# Patient Record
Sex: FEMALE | Race: WHITE | Employment: OTHER | URBAN - METROPOLITAN AREA
[De-identification: names, ages, dates, MRNs, and addresses within clinical notes are randomized per-mention and may not be internally consistent; named-entity substitution may affect disease eponyms.]

---

## 2017-03-09 ENCOUNTER — HOSPITAL ENCOUNTER (OUTPATIENT)
Dept: MAMMOGRAPHY | Age: 72
Discharge: HOME | End: 2017-03-09
Attending: FAMILY MEDICINE
Payer: MEDICARE

## 2017-03-09 DIAGNOSIS — Z78.0 POSTMENOPAUSAL: ICD-10-CM

## 2017-03-09 PROCEDURE — 77080 DXA BONE DENSITY AXIAL: CPT

## 2017-05-08 ENCOUNTER — APPOINTMENT (RX ONLY)
Dept: URBAN - METROPOLITAN AREA CLINIC 23 | Facility: CLINIC | Age: 72
Setting detail: DERMATOLOGY
End: 2017-05-08

## 2017-05-08 DIAGNOSIS — D22 MELANOCYTIC NEVI: ICD-10-CM

## 2017-05-08 DIAGNOSIS — D18.0 HEMANGIOMA: ICD-10-CM

## 2017-05-08 DIAGNOSIS — D485 NEOPLASM OF UNCERTAIN BEHAVIOR OF SKIN: ICD-10-CM

## 2017-05-08 DIAGNOSIS — Z85.828 PERSONAL HISTORY OF OTHER MALIGNANT NEOPLASM OF SKIN: ICD-10-CM

## 2017-05-08 DIAGNOSIS — L50.8 OTHER URTICARIA: ICD-10-CM

## 2017-05-08 PROBLEM — D22.62 MELANOCYTIC NEVI OF LEFT UPPER LIMB, INCLUDING SHOULDER: Status: ACTIVE | Noted: 2017-05-08

## 2017-05-08 PROBLEM — M12.9 ARTHROPATHY, UNSPECIFIED: Status: ACTIVE | Noted: 2017-05-08

## 2017-05-08 PROBLEM — J30.1 ALLERGIC RHINITIS DUE TO POLLEN: Status: ACTIVE | Noted: 2017-05-08

## 2017-05-08 PROBLEM — D18.01 HEMANGIOMA OF SKIN AND SUBCUTANEOUS TISSUE: Status: ACTIVE | Noted: 2017-05-08

## 2017-05-08 PROBLEM — D48.5 NEOPLASM OF UNCERTAIN BEHAVIOR OF SKIN: Status: ACTIVE | Noted: 2017-05-08

## 2017-05-08 PROCEDURE — 11100: CPT

## 2017-05-08 PROCEDURE — A4550 SURGICAL TRAYS: HCPCS

## 2017-05-08 PROCEDURE — 99214 OFFICE O/P EST MOD 30 MIN: CPT | Mod: 25

## 2017-05-08 PROCEDURE — ? BIOPSY BY SHAVE METHOD

## 2017-05-08 PROCEDURE — ? COUNSELING

## 2017-05-08 PROCEDURE — ? TREATMENT REGIMEN

## 2017-05-08 ASSESSMENT — LOCATION SIMPLE DESCRIPTION DERM
LOCATION SIMPLE: LEFT SHOULDER
LOCATION SIMPLE: LEFT TEMPLE
LOCATION SIMPLE: RIGHT PRETIBIAL REGION
LOCATION SIMPLE: ABDOMEN
LOCATION SIMPLE: LEFT PRETIBIAL REGION
LOCATION SIMPLE: LEFT HAND
LOCATION SIMPLE: RIGHT HAND
LOCATION SIMPLE: UPPER BACK

## 2017-05-08 ASSESSMENT — LOCATION ZONE DERM
LOCATION ZONE: LEG
LOCATION ZONE: HAND
LOCATION ZONE: TRUNK
LOCATION ZONE: ARM
LOCATION ZONE: FACE

## 2017-05-08 ASSESSMENT — LOCATION DETAILED DESCRIPTION DERM
LOCATION DETAILED: LEFT LATERAL DISTAL PRETIBIAL REGION
LOCATION DETAILED: LEFT LATERAL TEMPLE
LOCATION DETAILED: RIGHT DISTAL PRETIBIAL REGION
LOCATION DETAILED: SUPERIOR THORACIC SPINE
LOCATION DETAILED: LEFT POSTERIOR SHOULDER
LOCATION DETAILED: RIGHT THENAR EMINENCE
LOCATION DETAILED: LEFT RADIAL PALM
LOCATION DETAILED: EPIGASTRIC SKIN
LOCATION DETAILED: LEFT DISTAL PRETIBIAL REGION

## 2017-05-08 NOTE — PROCEDURE: MIPS QUALITY
Quality 110: Preventive Care And Screening: Influenza Immunization: Influenza Immunization previously received during influenza season
Detail Level: Simple
Quality 111:Pneumonia Vaccination Status For Older Adults: Pneumococcal Vaccination Previously Received
Quality 130: Documentation Of Current Medications In The Medical Record: Current Medications Documented

## 2017-05-08 NOTE — PROCEDURE: BIOPSY BY SHAVE METHOD
Silver Nitrate Text: The wound bed was treated with silver nitrate after the biopsy was performed.
Anesthesia Type: 1% lidocaine with 1:200,000 epinephrine and a 1:10 solution of 8.4% sodium bicarbonate
Detail Level: Detailed
Wound Care: Vaseline
Cryotherapy Text: The wound bed was treated with cryotherapy after the biopsy was performed.
Billing Type: Third-Party Bill
Hemostasis: Aluminum Chloride
Anesthesia Volume In Cc: 0.5
Dressing: pressure dressing with telfa
Biopsy Method: Dermablade
Electrodesiccation Text: The wound bed was treated with electrodesiccation after the biopsy was performed.
Size Of Lesion In Cm: 0
Consent: Written consent was obtained and risks were reviewed including but not limited to scarring, infection, bleeding, scabbing, incomplete removal, nerve damage and allergy to anesthesia.
Render Post-Care Instructions In Note?: no
Type Of Destruction Used: Curettage
Post-Care Instructions: I reviewed with the patient in detail post-care instructions. Patient is to keep the biopsy site dry overnight, and then apply bacitracin twice daily until healed. Patient may apply hydrogen peroxide soaks to remove any crusting.
Biopsy Type: H and E
Accession #: [id number]
Electrodesiccation And Curettage Text: The wound bed was treated with electrodesiccation and curettage after the biopsy was performed.
Notification Instructions: Patient will be notified of biopsy results. However, patient instructed to call the office if not contacted within 2 weeks.
Bill For Surgical Tray: yes

## 2017-05-08 NOTE — PROCEDURE: TREATMENT REGIMEN
Detail Level: Zone
Plan: Take Xyzal every morning and Benadryl at night . Patient given information on Xolair  to consider for treatment .

## 2017-06-13 ENCOUNTER — APPOINTMENT (RX ONLY)
Dept: URBAN - METROPOLITAN AREA CLINIC 24 | Facility: CLINIC | Age: 72
Setting detail: DERMATOLOGY
End: 2017-06-13

## 2017-06-13 PROBLEM — C44.319 BASAL CELL CARCINOMA OF SKIN OF OTHER PARTS OF FACE: Status: ACTIVE | Noted: 2017-06-13

## 2017-06-13 PROCEDURE — 17311 MOHS 1 STAGE H/N/HF/G: CPT

## 2017-06-13 PROCEDURE — 12051 INTMD RPR FACE/MM 2.5 CM/<: CPT

## 2017-06-13 PROCEDURE — ? MOHS SURGERY

## 2017-06-13 PROCEDURE — A4550 SURGICAL TRAYS: HCPCS

## 2017-06-13 NOTE — PROCEDURE: MOHS SURGERY
Consent (Ear)/Introductory Paragraph: The rationale for Mohs was explained to the patient and consent was obtained. The risks, benefits and alternatives to therapy were discussed in detail. Specifically, the risks of ear deformity, infection, scarring, bleeding, prolonged wound healing, incomplete removal, allergy to anesthesia, nerve injury and recurrence were addressed. Prior to the procedure, the treatment site was clearly identified and confirmed by the patient. All components of Universal Protocol/PAUSE Rule completed.
Dermal Autograft Text: The defect edges were debeveled with a #15 scalpel blade.  Given the location of the defect, shape of the defect and the proximity to free margins a dermal autograft was deemed most appropriate.  Using a sterile surgical marker, the primary defect shape was transferred to the donor site. The area thus outlined was incised deep to adipose tissue with a #15 scalpel blade.  The harvested graft was then trimmed of adipose and epidermal tissue until only dermis was left.  The skin graft was then placed in the primary defect and oriented appropriately.
Stage 8: Additional Anesthesia Volume In Cc: 0
Surgical Defect Width In Cm (Optional): -
Postop Diagnosis: same
Quadrant Reporting?: no
Eye Protection Verbiage: Before proceeding with the stage, a plastic scleral shield was inserted. The globe was anesthetized with a few drops of 1% lidocaine with 1:100,000 epinephrine. Then, an appropriate sized scleral shield was chosen and coated with lacrilube ointment. The shield was gently inserted and left in place for the duration of each stage. After the stage was completed, the shield was gently removed.
Complex Repair And Graft Additional Text (Will Appearing After The Standard Complex Repair Text): The complex repair was not sufficient to completely close the primary defect. The remaining additional defect was repaired with the graft mentioned below.
Simple / Intermediate / Complex Repair - Final Wound Length In Cm: 1.5
Alar Island Pedicle Flap Text: The defect edges were debeveled with a #15 scalpel blade.  Given the location of the defect, shape of the defect and the proximity to the alar rim an island pedicle advancement flap was deemed most appropriate.  Using a sterile surgical marker, an appropriate advancement flap was drawn incorporating the defect, outlining the appropriate donor tissue and placing the expected incisions within the nasal ala running parallel to the alar rim. The area thus outlined was incised with a #15 scalpel blade.  The skin margins were undermined minimally to an appropriate distance in all directions around the primary defect and laterally outward around the island pedicle utilizing iris scissors.  There was minimal undermining beneath the pedicle flap.
Graft Donor Site Bandage (Optional-Leave Blank If You Don't Want In Note): Steri-strips and a pressure bandage were applied to the donor site.
Estimated Blood Loss (Cc): minimal
Skin Substitute Text: The defect edges were debeveled with a #15 scalpel blade.  Given the location of the defect, shape of the defect and the proximity to free margins a skin substitute graft was deemed most appropriate.  The graft material was trimmed to fit the size of the defect. The graft was then placed in the primary defect and oriented appropriately.
Biopsy Photograph Reviewed: Yes
Posterior Auricular Interpolation Flap Text: A decision was made to reconstruct the defect utilizing an interpolation axial flap and a staged reconstruction.  A telfa template was made of the defect.  This telfa template was then used to outline the posterior auricular interpolation flap.  The donor area for the pedicle flap was then injected with anesthesia.  The flap was excised through the skin and subcutaneous tissue down to the layer of the underlying musculature.  The pedicle flap was carefully excised within this deep plane to maintain its blood supply.  The edges of the donor site were undermined.   The donor site was closed in a primary fashion.  The pedicle was then rotated into position and sutured.  Once the tube was sutured into place, adequate blood supply was confirmed with blanching and refill.  The pedicle was then wrapped with xeroform gauze and dressed appropriately with a telfa and gauze bandage to ensure continued blood supply and protect the attached pedicle.
Consent 3/Introductory Paragraph: I gave the patient a chance to ask questions they had about the procedure.  Following this I explained the Mohs procedure and consent was obtained. The risks, benefits and alternatives to therapy were discussed in detail. Specifically, the risks of infection, scarring, bleeding, prolonged wound healing, incomplete removal, allergy to anesthesia, nerve injury and recurrence were addressed. Prior to the procedure, the treatment site was clearly identified and confirmed by the patient. All components of Universal Protocol/PAUSE Rule completed.
V-Y Flap Text: The defect edges were debeveled with a #15 scalpel blade.  Given the location of the defect, shape of the defect and the proximity to free margins a V-Y flap was deemed most appropriate.  Using a sterile surgical marker, an appropriate advancement flap was drawn incorporating the defect and placing the expected incisions within the relaxed skin tension lines where possible.    The area thus outlined was incised deep to adipose tissue with a #15 scalpel blade.  The skin margins were undermined to an appropriate distance in all directions utilizing iris scissors.
Consent Type: Consent 1 (Standard)
Complex Repair And Flap Additional Text (Will Appearing After The Standard Complex Repair Text): The complex repair was not sufficient to completely close the primary defect. The remaining additional defect was repaired with the flap mentioned below.
Area M Indication Text: Tumors in this location are included in Area M (cheek, forehead, scalp, neck, jawline and pretibial skin).  Mohs surgery is indicated for tumors 1 cm or larger in these anatomic locations.
Ear Wedge Repair Text: A wedge excision was completed by carrying down an excision through the full thickness of the ear and cartilage with an inward facing Burow's triangle. The wound was then closed in a layered fashion.
Graft Donor Site Dermal Sutures (Optional): 5-0 PDS
Additional Epidermal Closure (Optional): vertical mattress
Dorsal Nasal Flap Text: The defect edges were debeveled with a #15 scalpel blade.  Given the location of the defect and the proximity to free margins a dorsal nasal flap was deemed most appropriate.  Using a sterile surgical marker, an appropriate dorsal nasal flap was drawn around the defect.    The area thus outlined was incised deep to adipose tissue with a #15 scalpel blade.  The skin margins were undermined to an appropriate distance in all directions utilizing iris scissors.
Initial Size Of Lesion: 0.4
Mohs Method Verbiage: An incision at a 45 degree angle following the standard Mohs approach was done and the specimen was harvested as a microscopic controlled layer.
Consent (Marginal Mandibular)/Introductory Paragraph: The rationale for Mohs was explained to the patient and consent was obtained. The risks, benefits and alternatives to therapy were discussed in detail. Specifically, the risks of damage to the marginal mandibular branch of the facial nerve, infection, scarring, bleeding, prolonged wound healing, incomplete removal, allergy to anesthesia, and recurrence were addressed. Prior to the procedure, the treatment site was clearly identified and confirmed by the patient. All components of Universal Protocol/PAUSE Rule completed.
Lazy S Complex Repair Preamble Text (Leave Blank If You Do Not Want): Extensive wide undermining was performed.
Crescentic Intermediate Repair Preamble Text (Leave Blank If You Do Not Want): Undermining was performed with blunt dissection.
No Residual Tumor Seen Histology Text: There were no malignant cells seen in the sections examined.
Anesthesia Volume In Cc: 3
Bi-Rhombic Flap Text: The defect edges were debeveled with a #15 scalpel blade.  Given the location of the defect and the proximity to free margins a bi-rhombic flap was deemed most appropriate.  Using a sterile surgical marker, an appropriate rhombic flap was drawn incorporating the defect. The area thus outlined was incised deep to adipose tissue with a #15 scalpel blade.  The skin margins were undermined to an appropriate distance in all directions utilizing iris scissors.
Purse String (Intermediate) Text: Given the location of the defect and the characteristics of the surrounding skin a pursestring intermediate closure was deemed most appropriate.  Undermining was performed circumfirentially around the surgical defect.  A purstring suture was then placed and tightened.
Consent (Spinal Accessory)/Introductory Paragraph: The rationale for Mohs was explained to the patient and consent was obtained. The risks, benefits and alternatives to therapy were discussed in detail. Specifically, the risks of damage to the spinal accessory nerve, infection, scarring, bleeding, prolonged wound healing, incomplete removal, allergy to anesthesia, and recurrence were addressed. Prior to the procedure, the treatment site was clearly identified and confirmed by the patient. All components of Universal Protocol/PAUSE Rule completed.
Closure 4 Information: This tab is for additional flaps and grafts above and beyond our usual structured repairs.  Please note if you enter information here it will not currently bill and you will need to add the billing information manually.
Stage 2: Additional Anesthesia Type: 1% lidocaine with 1:100,000 epinephrine
Bcc Histology Text: There were numerous aggregates of basaloid cells.
Burow's Advancement Flap Text: The defect edges were debeveled with a #15 scalpel blade.  Given the location of the defect and the proximity to free margins a Burow's advancement flap was deemed most appropriate.  Using a sterile surgical marker, the appropriate advancement flap was drawn incorporating the defect and placing the expected incisions within the relaxed skin tension lines where possible.    The area thus outlined was incised deep to adipose tissue with a #15 scalpel blade.  The skin margins were undermined to an appropriate distance in all directions utilizing iris scissors.
Unna Boot Text: An Unna boot was placed to help immobilize the limb and facilitate more rapid healing.
No Repair - Repaired With Adjacent Surgical Defect Text (Leave Blank If You Do Not Want): After obtaining clear surgical margins the defect was repaired concurrently with another surgical defect which was in close approximation.
Dressing: dry sterile dressing
Cartilage Graft Text: The defect edges were debeveled with a #15 scalpel blade.  Given the location of the defect, shape of the defect, the fact the defect involved a full thickness cartilage defect a cartilage graft was deemed most appropriate.  An appropriate donor site was identified, cleansed, and anesthetized. The cartilage graft was then harvested and transferred to the recipient site, oriented appropriately and then sutured into place.  The secondary defect was then repaired using a primary closure.
O-T Advancement Flap Text: The defect edges were debeveled with a #15 scalpel blade.  Given the location of the defect, shape of the defect and the proximity to free margins an O-T advancement flap was deemed most appropriate.  Using a sterile surgical marker, an appropriate advancement flap was drawn incorporating the defect and placing the expected incisions within the relaxed skin tension lines where possible.    The area thus outlined was incised deep to adipose tissue with a #15 scalpel blade.  The skin margins were undermined to an appropriate distance in all directions utilizing iris scissors.
Trilobed Flap Text: The defect edges were debeveled with a #15 scalpel blade.  Given the location of the defect and the proximity to free margins a trilobed flap was deemed most appropriate.  Using a sterile surgical marker, an appropriate trilobed flap drawn around the defect.    The area thus outlined was incised deep to adipose tissue with a #15 scalpel blade.  The skin margins were undermined to an appropriate distance in all directions utilizing iris scissors.
Stage 13: Additional Anesthesia Type: 1% lidocaine with epinephrine
Island Pedicle Flap With Canthal Suspension Text: The defect edges were debeveled with a #15 scalpel blade.  Given the location of the defect, shape of the defect and the proximity to free margins an island pedicle advancement flap was deemed most appropriate.  Using a sterile surgical marker, an appropriate advancement flap was drawn incorporating the defect, outlining the appropriate donor tissue and placing the expected incisions within the relaxed skin tension lines where possible. The area thus outlined was incised deep to adipose tissue with a #15 scalpel blade.  The skin margins were undermined to an appropriate distance in all directions around the primary defect and laterally outward around the island pedicle utilizing iris scissors.  There was minimal undermining beneath the pedicle flap. A suspension suture was placed in the canthal tendon to prevent tension and prevent ectropion.
Rotation Flap Text: The defect edges were debeveled with a #15 scalpel blade.  Given the location of the defect, shape of the defect and the proximity to free margins a rotation flap was deemed most appropriate.  Using a sterile surgical marker, an appropriate rotation flap was drawn incorporating the defect and placing the expected incisions within the relaxed skin tension lines where possible.    The area thus outlined was incised deep to adipose tissue with a #15 scalpel blade.  The skin margins were undermined to an appropriate distance in all directions utilizing iris scissors.
Mohs Histo Method Verbiage: The section(s) were then chromacoded and processed in the Mohs lab using the Mohs protocol and submitted for frozen section.
Double Island Pedicle Flap Text: The defect edges were debeveled with a #15 scalpel blade.  Given the location of the defect, shape of the defect and the proximity to free margins a double island pedicle advancement flap was deemed most appropriate.  Using a sterile surgical marker, an appropriate advancement flap was drawn incorporating the defect, outlining the appropriate donor tissue and placing the expected incisions within the relaxed skin tension lines where possible.    The area thus outlined was incised deep to adipose tissue with a #15 scalpel blade.  The skin margins were undermined to an appropriate distance in all directions around the primary defect and laterally outward around the island pedicle utilizing iris scissors.  There was minimal undermining beneath the pedicle flap.
Anesthesia Type: 1% lidocaine without epinephrine and a 1:10 solution of 8.4% sodium bicarbonate
Alternatives Discussed Intro (Do Not Add Period): I discussed alternative treatments to Mohs surgery and specifically discussed the risks and benefits of
Split-Thickness Skin Graft Text: The defect edges were debeveled with a #15 scalpel blade.  Given the location of the defect, shape of the defect and the proximity to free margins a split thickness skin graft was deemed most appropriate.  Using a sterile surgical marker, the primary defect shape was transferred to the donor site. The split thickness graft was then harvested.  The skin graft was then placed in the primary defect and oriented appropriately.
Melolabial Interpolation Flap Text: A decision was made to reconstruct the defect utilizing an interpolation axial flap and a staged reconstruction.  A telfa template was made of the defect.  This telfa template was then used to outline the melolabial interpolation flap.  The donor area for the pedicle flap was then injected with anesthesia.  The flap was excised through the skin and subcutaneous tissue down to the layer of the underlying musculature.  The pedicle flap was carefully excised within this deep plane to maintain its blood supply.  The edges of the donor site were undermined.   The donor site was closed in a primary fashion.  The pedicle was then rotated into position and sutured.  Once the tube was sutured into place, adequate blood supply was confirmed with blanching and refill.  The pedicle was then wrapped with xeroform gauze and dressed appropriately with a telfa and gauze bandage to ensure continued blood supply and protect the attached pedicle.
Referred To Mid-Level For Closure Text (Leave Blank If You Do Not Want): After obtaining clear surgical margins the patient was sent to a mid-level provider for surgical repair.  The patient understands they will receive post-surgical care and follow-up from the mid-level provider.
Anesthesia Volume In Cc: 2.5
Anesthesia Type: 1% lidocaine without epinephrine
Xenograft Text: The defect edges were debeveled with a #15 scalpel blade.  Given the location of the defect, shape of the defect and the proximity to free margins a xenograft was deemed most appropriate.  The graft was then trimmed to fit the size of the defect.  The graft was then placed in the primary defect and oriented appropriately.
Detail Level: Detailed
Number Of Stages: 1
Graft Donor Site Epidermal Sutures (Optional): 6-0 Prolene
Advancement-Rotation Flap Text: The defect edges were debeveled with a #15 scalpel blade.  Given the location of the defect, shape of the defect and the proximity to free margins an advancement-rotation flap was deemed most appropriate.  Using a sterile surgical marker, an appropriate flap was drawn incorporating the defect and placing the expected incisions within the relaxed skin tension lines where possible. The area thus outlined was incised deep to adipose tissue with a #15 scalpel blade.  The skin margins were undermined to an appropriate distance in all directions utilizing iris scissors.
Bcc Infiltrative Histology Text: There were numerous aggregates of basaloid cells demonstrating an infiltrative pattern.
Rhombic Flap Text: The defect edges were debeveled with a #15 scalpel blade.  Given the location of the defect and the proximity to free margins a rhombic flap was deemed most appropriate.  Using a sterile surgical marker, an appropriate rhombic flap was drawn incorporating the defect.    The area thus outlined was incised deep to adipose tissue with a #15 scalpel blade.  The skin margins were undermined to an appropriate distance in all directions utilizing iris scissors.
Area H Indication Text: Tumors in this location are included in Area H (eyelids, eyebrows, nose, lips, chin, ear, pre-auricular, post-auricular, temple, genitalia, hands, feet, ankles and areola).  Tissue conservation is critical in these anatomic locations.
Advancement Flap (Double) Text: The defect edges were debeveled with a #15 scalpel blade.  Given the location of the defect and the proximity to free margins a double advancement flap was deemed most appropriate.  Using a sterile surgical marker, the appropriate advancement flaps were drawn incorporating the defect and placing the expected incisions within the relaxed skin tension lines where possible.    The area thus outlined was incised deep to adipose tissue with a #15 scalpel blade.  The skin margins were undermined to an appropriate distance in all directions utilizing iris scissors.
Consent (Lip)/Introductory Paragraph: The rationale for Mohs was explained to the patient and consent was obtained. The risks, benefits and alternatives to therapy were discussed in detail. Specifically, the risks of lip deformity, changes in the oral aperture, infection, scarring, bleeding, prolonged wound healing, incomplete removal, allergy to anesthesia, nerve injury and recurrence were addressed. Prior to the procedure, the treatment site was clearly identified and confirmed by the patient. All components of Universal Protocol/PAUSE Rule completed.
Island Pedicle Flap-Requiring Vessel Identification Text: The defect edges were debeveled with a #15 scalpel blade.  Given the location of the defect, shape of the defect and the proximity to free margins an island pedicle advancement flap was deemed most appropriate.  Using a sterile surgical marker, an appropriate advancement flap was drawn, based on the axial vessel mentioned above, incorporating the defect, outlining the appropriate donor tissue and placing the expected incisions within the relaxed skin tension lines where possible.    The area thus outlined was incised deep to adipose tissue with a #15 scalpel blade.  The skin margins were undermined to an appropriate distance in all directions around the primary defect and laterally outward around the island pedicle utilizing iris scissors.  There was minimal undermining beneath the pedicle flap.
O-T Plasty Text: The defect edges were debeveled with a #15 scalpel blade.  Given the location of the defect, shape of the defect and the proximity to free margins an O-T plasty was deemed most appropriate.  Using a sterile surgical marker, an appropriate O-T plasty was drawn incorporating the defect and placing the expected incisions within the relaxed skin tension lines where possible.    The area thus outlined was incised deep to adipose tissue with a #15 scalpel blade.  The skin margins were undermined to an appropriate distance in all directions utilizing iris scissors.
Partial Purse String (Intermediate) Text: Given the location of the defect and the characteristics of the surrounding skin an intermediate purse string closure was deemed most appropriate.  Undermining was performed circumfirentially around the surgical defect.  A purse string suture was then placed and tightened. Wound tension only allowed a partial closure of the circular defect.
Consent 2/Introductory Paragraph: Mohs surgery was explained to the patient and consent was obtained. The risks, benefits and alternatives to therapy were discussed in detail. Specifically, the risks of infection, scarring, bleeding, prolonged wound healing, incomplete removal, allergy to anesthesia, nerve injury and recurrence were addressed. Prior to the procedure, the treatment site was clearly identified and confirmed by the patient. All components of Universal Protocol/PAUSE Rule completed.
Tissue Cultured Epidermal Autograft Text: The defect edges were debeveled with a #15 scalpel blade.  Given the location of the defect, shape of the defect and the proximity to free margins a tissue cultured epidermal autograft was deemed most appropriate.  The graft was then trimmed to fit the size of the defect.  The graft was then placed in the primary defect and oriented appropriately.
Tumor Debulked?: curette
Localized Dermabrasion With Wire Brush Text: The patient was draped in routine manner.  Localized dermabrasion using 3 x 17 mm wire brush was performed in routine manner to papillary dermis. This spot dermabrasion is being performed to complete skin cancer reconstruction. It also will eliminate the other sun damaged precancerous cells that are known to be part of the regional effect of a lifetime's worth of sun exposure. This localized dermabrasion is therapeutic and should not be considered cosmetic in any regard.
Wound Care: Bacitracin
Secondary Intention Text (Leave Blank If You Do Not Want): The defect will heal with secondary intention.
H Plasty Text: Given the location of the defect, shape of the defect and the proximity to free margins a H-plasty was deemed most appropriate for repair.  Using a sterile surgical marker, the appropriate advancement arms of the H-plasty were drawn incorporating the defect and placing the expected incisions within the relaxed skin tension lines where possible. The area thus outlined was incised deep to adipose tissue with a #15 scalpel blade. The skin margins were undermined to an appropriate distance in all directions utilizing iris scissors.  The opposing advancement arms were then advanced into place in opposite direction and anchored with interrupted buried subcutaneous sutures.
A-T Advancement Flap Text: The defect edges were debeveled with a #15 scalpel blade.  Given the location of the defect, shape of the defect and the proximity to free margins an A-T advancement flap was deemed most appropriate.  Using a sterile surgical marker, an appropriate advancement flap was drawn incorporating the defect and placing the expected incisions within the relaxed skin tension lines where possible.    The area thus outlined was incised deep to adipose tissue with a #15 scalpel blade.  The skin margins were undermined to an appropriate distance in all directions utilizing iris scissors.
Repair Type: Intermediate Layered Repair
Mauc Instructions: By selecting yes to the question below the MAUC number will be added into the note.  This will be calculated automatically based on the diagnosis chosen, the size entered, the body zone selected (H,M,L) and the specific indications you chose. You will also have the option to override the Mohs AUC if you disagree with the automatically calculated number and this option is found in the Case Summary tab.
Location Indication Override (Is Already Calculated Based On Selected Body Location): Area H
Post-Care Instructions: I reviewed with the patient in detail post-care instructions. Patient is not to engage in any heavy lifting, exercise, or swimming for the next 14 days. Should the patient develop any fevers, chills, bleeding, severe pain patient will contact the office immediately..\\n.
Transposition Flap Text: The defect edges were debeveled with a #15 scalpel blade.  Given the location of the defect and the proximity to free margins a transposition flap was deemed most appropriate.  Using a sterile surgical marker, an appropriate transposition flap was drawn incorporating the defect.    The area thus outlined was incised deep to adipose tissue with a #15 scalpel blade.  The skin margins were undermined to an appropriate distance in all directions utilizing iris scissors.
Area L Indication Text: Tumors in this location are included in Area L (trunk and extremities).  Mohs surgery is indicated for larger tumors, 2 cm or larger, in these anatomic locations.
Epidermal Autograft Text: The defect edges were debeveled with a #15 scalpel blade.  Given the location of the defect, shape of the defect and the proximity to free margins an epidermal autograft was deemed most appropriate.  Using a sterile surgical marker, the primary defect shape was transferred to the donor site. The epidermal graft was then harvested.  The skin graft was then placed in the primary defect and oriented appropriately.
Consent (Near Eyelid Margin)/Introductory Paragraph: The rationale for Mohs was explained to the patient and consent was obtained. The risks, benefits and alternatives to therapy were discussed in detail. Specifically, the risks of ectropion or eyelid deformity, infection, scarring, bleeding, prolonged wound healing, incomplete removal, allergy to anesthesia, nerve injury and recurrence were addressed. Prior to the procedure, the treatment site was clearly identified and confirmed by the patient. All components of Universal Protocol/PAUSE Rule completed.
Full Thickness Lip Wedge Repair (Flap) Text: Given the location of the defect and the proximity to free margins a full thickness wedge repair was deemed most appropriate.  Using a sterile surgical marker, the appropriate repair was drawn incorporating the defect and placing the expected incisions perpendicular to the vermillion border.  The vermillion border was also meticulously outlined to ensure appropriate reapproximation during the repair.  The area thus outlined was incised through and through with a #15 scalpel blade.  The muscularis and dermis were reaproximated with deep sutures following hemostasis. Care was taken to realign the vermillion border before proceeding with the superficial closure.  Once the vermillion was realigned the superfical and mucosal closure was finished.
Crescentic Advancement Flap Text: The defect edges were debeveled with a #15 scalpel blade.  Given the location of the defect and the proximity to free margins a crescentic advancement flap was deemed most appropriate.  Using a sterile surgical marker, the appropriate advancement flap was drawn incorporating the defect and placing the expected incisions within the relaxed skin tension lines where possible.    The area thus outlined was incised deep to adipose tissue with a #15 scalpel blade.  The skin margins were undermined to an appropriate distance in all directions utilizing iris scissors.
Paramedian Forehead Flap Text: A decision was made to reconstruct the defect utilizing an interpolation axial flap and a staged reconstruction.  A telfa template was made of the defect.  This telfa template was then used to outline the paramedian forehead pedicle flap.  The donor area for the pedicle flap was then injected with anesthesia.  The flap was excised through the skin and subcutaneous tissue down to the layer of the underlying musculature.  The pedicle flap was carefully excised within this deep plane to maintain its blood supply.  The edges of the donor site were undermined.   The donor site was closed in a primary fashion.  The pedicle was then rotated into position and sutured.  Once the tube was sutured into place, adequate blood supply was confirmed with blanching and refill.  The pedicle was then wrapped with xeroform gauze and dressed appropriately with a telfa and gauze bandage to ensure continued blood supply and protect the attached pedicle.
Cheek-To-Nose Interpolation Flap Text: A decision was made to reconstruct the defect utilizing an interpolation axial flap and a staged reconstruction.  A telfa template was made of the defect.  This telfa template was then used to outline the Cheek-To-Nose Interpolation flap.  The donor area for the pedicle flap was then injected with anesthesia.  The flap was excised through the skin and subcutaneous tissue down to the layer of the underlying musculature.  The interpolation flap was carefully excised within this deep plane to maintain its blood supply.  The edges of the donor site were undermined.   The donor site was closed in a primary fashion.  The pedicle was then rotated into position and sutured.  Once the tube was sutured into place, adequate blood supply was confirmed with blanching and refill.  The pedicle was then wrapped with xeroform gauze and dressed appropriately with a telfa and gauze bandage to ensure continued blood supply and protect the attached pedicle.
Bilobed Transposition Flap Text: The defect edges were debeveled with a #15 scalpel blade.  Given the location of the defect and the proximity to free margins a bilobed transposition flap was deemed most appropriate.  Using a sterile surgical marker, an appropriate bilobe flap drawn around the defect.    The area thus outlined was incised deep to adipose tissue with a #15 scalpel blade.  The skin margins were undermined to an appropriate distance in all directions utilizing iris scissors.
Partial Purse String (Simple) Text: Given the location of the defect and the characteristics of the surrounding skin a simple purse string closure was deemed most appropriate.  Undermining was performed circumfirentially around the surgical defect.  A purse string suture was then placed and tightened. Wound tension only allowed a partial closure of the circular defect.
Mastoid Interpolation Flap Text: A decision was made to reconstruct the defect utilizing an interpolation axial flap and a staged reconstruction.  A telfa template was made of the defect.  This telfa template was then used to outline the mastoid interpolation flap.  The donor area for the pedicle flap was then injected with anesthesia.  The flap was excised through the skin and subcutaneous tissue down to the layer of the underlying musculature.  The pedicle flap was carefully excised within this deep plane to maintain its blood supply.  The edges of the donor site were undermined.   The donor site was closed in a primary fashion.  The pedicle was then rotated into position and sutured.  Once the tube was sutured into place, adequate blood supply was confirmed with blanching and refill.  The pedicle was then wrapped with xeroform gauze and dressed appropriately with a telfa and gauze bandage to ensure continued blood supply and protect the attached pedicle.
Hatchet Flap Text: The defect edges were debeveled with a #15 scalpel blade.  Given the location of the defect, shape of the defect and the proximity to free margins a hatchet flap was deemed most appropriate.  Using a sterile surgical marker, an appropriate hatchet flap was drawn incorporating the defect and placing the expected incisions within the relaxed skin tension lines where possible.    The area thus outlined was incised deep to adipose tissue with a #15 scalpel blade.  The skin margins were undermined to an appropriate distance in all directions utilizing iris scissors.
Cheiloplasty (Complex) Text: A decision was made to reconstruct the defect with a  cheiloplasty.  The defect was undermined extensively.  Additional obicularis oris muscle was excised with a 15 blade scalpel.  The defect was converted into a full thickness wedge to facilite a better cosmetic result.  Small vessels were then tied off with 5-0 monocyrl. The obicularis oris, superficial fascia, adipose and dermis were then reapproximated.  After the deeper layers were approximated the epidermis was reapproximated with particular care given to realign the vermillion border.
Deep Sutures: 5-0 Polysorb
Consent 1/Introductory Paragraph: The rationale for Mohs was explained to the patient and consent was obtained. The risks, benefits and alternatives to therapy were discussed in detail. Specifically, the risks of infection, scarring, bleeding, prolonged wound healing, incomplete removal, allergy to anesthesia, nerve injury and recurrence were addressed. Prior to the procedure, the treatment site was clearly identified and confirmed by the patient. All components of Universal Protocol/PAUSE Rule completed.
Advancement Flap (Single) Text: The defect edges were debeveled with a #15 scalpel blade.  Given the location of the defect and the proximity to free margins a single advancement flap was deemed most appropriate.  Using a sterile surgical marker, an appropriate advancement flap was drawn incorporating the defect and placing the expected incisions within the relaxed skin tension lines where possible.    The area thus outlined was incised deep to adipose tissue with a #15 scalpel blade.  The skin margins were undermined to an appropriate distance in all directions utilizing iris scissors.
Melolabial Transposition Flap Text: The defect edges were debeveled with a #15 scalpel blade.  Given the location of the defect and the proximity to free margins a melolabial flap was deemed most appropriate.  Using a sterile surgical marker, an appropriate melolabial transposition flap was drawn incorporating the defect.    The area thus outlined was incised deep to adipose tissue with a #15 scalpel blade.  The skin margins were undermined to an appropriate distance in all directions utilizing iris scissors.
Manual Repair Warning Statement: We plan on removing the manually selected variable below in favor of our much easier automatic structured text blocks found in the previous tab. We decided to do this to help make the flow better and give you the full power of structured data. Manual selection is never going to be ideal in our platform and I would encourage you to avoid using manual selection from this point on, especially since I will be sunsetting this feature. It is important that you do one of two things with the customized text below. First, you can save all of the text in a word file so you can have it for future reference. Second, transfer the text to the appropriate area in the Library tab. Lastly, if there is a flap or graft type which we do not have you need to let us know right away so I can add it in before the variable is hidden. No need to panic, we plan to give you roughly 6 months to make the change.
W Plasty Text: The lesion was extirpated to the level of the fat with a #15 scalpel blade.  Given the location of the defect, shape of the defect and the proximity to free margins a W-plasty was deemed most appropriate for repair.  Using a sterile surgical marker, the appropriate transposition arms of the W-plasty were drawn incorporating the defect and placing the expected incisions within the relaxed skin tension lines where possible.    The area thus outlined was incised deep to adipose tissue with a #15 scalpel blade.  The skin margins were undermined to an appropriate distance in all directions utilizing iris scissors.  The opposing transposition arms were then transposed into place in opposite direction and anchored with interrupted buried subcutaneous sutures.
Same Histology In Subsequent Stages Text: The pattern and morphology of the tumor is as described in the first stage.
Cheiloplasty (Less Than 50%) Text: A decision was made to reconstruct the defect with a  cheiloplasty.  The defect was undermined extensively.  Additional obicularis oris muscle was excised with a 15 blade scalpel.  The defect was converted into a full thickness wedge, of less than 50% of the vertical height of the lip, to facilite a better cosmetic result.  Small vessels were then tied off with 5-0 monocyrl. The obicularis oris, superficial fascia, adipose and dermis were then reapproximated.  After the deeper layers were approximated the epidermis was reapproximated with particular care given to realign the vermillion border.
Medical Necessity Statement: Based on my medical judgement, Mohs surgery is the most appropriate treatment for this cancer compared to other treatments.
O-Z Plasty Text: The defect edges were debeveled with a #15 scalpel blade.  Given the location of the defect, shape of the defect and the proximity to free margins an O-Z plasty (double transposition flap) was deemed most appropriate.  Using a sterile surgical marker, the appropriate transposition flaps were drawn incorporating the defect and placing the expected incisions within the relaxed skin tension lines where possible.    The area thus outlined was incised deep to adipose tissue with a #15 scalpel blade.  The skin margins were undermined to an appropriate distance in all directions utilizing iris scissors.  Hemostasis was achieved with electrocautery.  The flaps were then transposed into place, one clockwise and the other counterclockwise, and anchored with interrupted buried subcutaneous sutures.
Ftsg Text: The defect edges were debeveled with a #15 scalpel blade.  Given the location of the defect, shape of the defect and the proximity to free margins a full thickness skin graft was deemed most appropriate.  Using a sterile surgical marker, the primary defect shape was transferred to the donor site. The area thus outlined was incised deep to adipose tissue with a #15 scalpel blade.  The harvested graft was then trimmed of adipose tissue until only dermis and epidermis was left.  The skin margins of the secondary defect were undermined to an appropriate distance in all directions utilizing iris scissors.  The secondary defect was closed with interrupted buried subcutaneous sutures.  The skin edges were then re-apposed with running  sutures.  The skin graft was then placed in the primary defect and oriented appropriately.
Referred To Plastics For Closure Text (Leave Blank If You Do Not Want): After obtaining clear surgical margins the patient was sent to plastics for surgical repair.  The patient understands they will receive post-surgical care and follow-up from the referring physician's office.
Referred To Otolaryngology For Closure Text (Leave Blank If You Do Not Want): After obtaining clear surgical margins the patient was sent to otolaryngology for surgical repair.  The patient understands they will receive post-surgical care and follow-up from the referring physician's office.
Consent (Nose)/Introductory Paragraph: The rationale for Mohs was explained to the patient and consent was obtained. The risks, benefits and alternatives to therapy were discussed in detail. Specifically, the risks of nasal deformity, changes in the flow of air through the nose, infection, scarring, bleeding, prolonged wound healing, incomplete removal, allergy to anesthesia, nerve injury and recurrence were addressed. Prior to the procedure, the treatment site was clearly identified and confirmed by the patient. All components of Universal Protocol/PAUSE Rule completed.
Mucosal Advancement Flap Text: Given the location of the defect, shape of the defect and the proximity to free margins a mucosal advancement flap was deemed most appropriate. Incisions were made with a 15 blade scalpel in the appropriate fashion along the cutaneous vermillion border and the mucosal lip. The remaining actinically damaged mucosal tissue was excised.  The mucosal advancement flap was then elevated to the gingival sulcus with care taken to preserve the neurovascular structures and advanced into the primary defect. Care was taken to ensure that precise realignment of the vermillion border was achieved.
Referred To Oculoplastics For Closure Text (Leave Blank If You Do Not Want): After obtaining clear surgical margins the patient was sent to oculoplastics for surgical repair.  The patient understands they will receive post-surgical care and follow-up from the referring physician's office.
Island Pedicle Flap Text: The defect edges were debeveled with a #15 scalpel blade.  Given the location of the defect, shape of the defect and the proximity to free margins an island pedicle advancement flap was deemed most appropriate.  Using a sterile surgical marker, an appropriate advancement flap was drawn incorporating the defect, outlining the appropriate donor tissue and placing the expected incisions within the relaxed skin tension lines where possible.    The area thus outlined was incised deep to adipose tissue with a #15 scalpel blade.  The skin margins were undermined to an appropriate distance in all directions around the primary defect and laterally outward around the island pedicle utilizing iris scissors.  There was minimal undermining beneath the pedicle flap.
Tarsorrhaphy Text: A tarsorrhaphy was performed using Frost sutures.
Consent (Temporal Branch)/Introductory Paragraph: The rationale for Mohs was explained to the patient and consent was obtained. The risks, benefits and alternatives to therapy were discussed in detail. Specifically, the risks of damage to the temporal branch of the facial nerve, infection, scarring, bleeding, prolonged wound healing, incomplete removal, allergy to anesthesia, and recurrence were addressed. Prior to the procedure, the treatment site was clearly identified and confirmed by the patient. All components of Universal Protocol/PAUSE Rule completed.
Star Wedge Flap Text: The defect edges were debeveled with a #15 scalpel blade.  Given the location of the defect, shape of the defect and the proximity to free margins a star wedge flap was deemed most appropriate.  Using a sterile surgical marker, an appropriate rotation flap was drawn incorporating the defect and placing the expected incisions within the relaxed skin tension lines where possible. The area thus outlined was incised deep to adipose tissue with a #15 scalpel blade.  The skin margins were undermined to an appropriate distance in all directions utilizing iris scissors.
Muscle Hinge Flap Text: The defect edges were debeveled with a #15 scalpel blade.  Given the size, depth and location of the defect and the proximity to free margins a muscle hinge flap was deemed most appropriate.  Using a sterile surgical marker, an appropriate hinge flap was drawn incorporating the defect. The area thus outlined was incised with a #15 scalpel blade.  The skin margins were undermined to an appropriate distance in all directions utilizing iris scissors.
Repair Performed By Another Provider Text (Leave Blank If You Do Not Want): After obtaining clear surgical margins the defect was repaired by another provider.
Repair Anesthesia Method: local infiltration
Consent (Scalp)/Introductory Paragraph: The rationale for Mohs was explained to the patient and consent was obtained. The risks, benefits and alternatives to therapy were discussed in detail. Specifically, the risks of changes in hair growth pattern secondary to repair, infection, scarring, bleeding, prolonged wound healing, incomplete removal, allergy to anesthesia, nerve injury and recurrence were addressed. Prior to the procedure, the treatment site was clearly identified and confirmed by the patient. All components of Universal Protocol/PAUSE Rule completed.
Hemostasis: Electrocautery
Surgeon: George brown
Mohs Case Number: [id number]
Purse String (Simple) Text: Given the location of the defect and the characteristics of the surrounding skin a pursestring closure was deemed most appropriate.  Undermining was performed circumfirentially around the surgical defect.  A purstring suture was then placed and tightened.
Helical Rim Advancement Flap Text: The defect edges were debeveled with a #15 blade scalpel.  Given the location of the defect and the proximity to free margins (helical rim) a double helical rim advancement flap was deemed most appropriate.  Using a sterile surgical marker, the appropriate advancement flaps were drawn incorporating the defect and placing the expected incisions between the helical rim and antihelix where possible.  The area thus outlined was incised through and through with a #15 scalpel blade.  With a skin hook and iris scissors, the flaps were gently and sharply undermined and freed up.
Composite Graft Text: The defect edges were debeveled with a #15 scalpel blade.  Given the location of the defect, shape of the defect, the proximity to free margins and the fact the defect was full thickness a composite graft was deemed most appropriate.  The defect was outline and then transferred to the donor site.  A full thickness graft was then excised from the donor site. The graft was then placed in the primary defect, oriented appropriately and then sutured into place.  The secondary defect was then repaired using a primary closure.
Modified Advancement Flap Text: The defect edges were debeveled with a #15 scalpel blade.  Given the location of the defect, shape of the defect and the proximity to free margins a modified advancement flap was deemed most appropriate.  Using a sterile surgical marker, an appropriate advancement flap was drawn incorporating the defect and placing the expected incisions within the relaxed skin tension lines where possible.    The area thus outlined was incised deep to adipose tissue with a #15 scalpel blade.  The skin margins were undermined to an appropriate distance in all directions utilizing iris scissors.
Inflammation Suggestive Of Cancer Camouflage Histology Text: There was a dense lymphocytic infiltrate which prevented adequate histologic evaluation of adjacent structures.
Z Plasty Text: The lesion was extirpated to the level of the fat with a #15 scalpel blade.  Given the location of the defect, shape of the defect and the proximity to free margins a Z-plasty was deemed most appropriate for repair.  Using a sterile surgical marker, the appropriate transposition arms of the Z-plasty were drawn incorporating the defect and placing the expected incisions within the relaxed skin tension lines where possible.    The area thus outlined was incised deep to adipose tissue with a #15 scalpel blade.  The skin margins were undermined to an appropriate distance in all directions utilizing iris scissors.  The opposing transposition arms were then transposed into place in opposite direction and anchored with interrupted buried subcutaneous sutures.
Closure 2 Information: This tab is for additional flaps and grafts, including complex repair and grafts and complex repair and flaps. You can also specify a different location for the additional defect, if the location is the same you do not need to select a new one. We will insert the automated text for the repair you select below just as we do for solitary flaps and grafts. Please note that at this time if you select a location with a different insurance zone you will need to override the ICD10 and CPT if appropriate.
Ear Star Wedge Flap Text: The defect edges were debeveled with a #15 blade scalpel.  Given the location of the defect and the proximity to free margins (helical rim) an ear star wedge flap was deemed most appropriate.  Using a sterile surgical marker, the appropriate flap was drawn incorporating the defect and placing the expected incisions between the helical rim and antihelix where possible.  The area thus outlined was incised through and through with a #15 scalpel blade.
Bilobed Flap Text: The defect edges were debeveled with a #15 scalpel blade.  Given the location of the defect and the proximity to free margins a bilobe flap was deemed most appropriate.  Using a sterile surgical marker, an appropriate bilobe flap drawn around the defect.    The area thus outlined was incised deep to adipose tissue with a #15 scalpel blade.  The skin margins were undermined to an appropriate distance in all directions utilizing iris scissors.
Stage 3: Additional Anesthesia Type: 0.5% lidocaine with 1:200,000 epinephrine and a 1:10 solution of 8.4% sodium bicarbonate
Interpolation Flap Text: A decision was made to reconstruct the defect utilizing an interpolation axial flap and a staged reconstruction.  A telfa template was made of the defect.  This telfa template was then used to outline the interpolation flap.  The donor area for the pedicle flap was then injected with anesthesia.  The flap was excised through the skin and subcutaneous tissue down to the layer of the underlying musculature.  The interpolation flap was carefully excised within this deep plane to maintain its blood supply.  The edges of the donor site were undermined.   The donor site was closed in a primary fashion.  The pedicle was then rotated into position and sutured.  Once the tube was sutured into place, adequate blood supply was confirmed with blanching and refill.  The pedicle was then wrapped with xeroform gauze and dressed appropriately with a telfa and gauze bandage to ensure continued blood supply and protect the attached pedicle.
Surgeon/Pathologist Verbiage (Will Incorporate Name Of Surgeon From Intro If Not Blank): operated in two distinct and integrated capacities as the surgeon and pathologist.
S Plasty Text: Given the location and shape of the defect, and the orientation of relaxed skin tension lines, an S-plasty was deemed most appropriate for repair.  Using a sterile surgical marker, the appropriate outline of the S-plasty was drawn, incorporating the defect and placing the expected incisions within the relaxed skin tension lines where possible.  The area thus outlined was incised deep to adipose tissue with a #15 scalpel blade.  The skin margins were undermined to an appropriate distance in all directions utilizing iris scissors. The skin flaps were advanced over the defect.  The opposing margins were then approximated with interrupted buried subcutaneous sutures.
Keystone Flap Text: The defect edges were debeveled with a #15 scalpel blade.  Given the location of the defect, shape of the defect a keystone flap was deemed most appropriate.  Using a sterile surgical marker, an appropriate keystone flap was drawn incorporating the defect, outlining the appropriate donor tissue and placing the expected incisions within the relaxed skin tension lines where possible. The area thus outlined was incised deep to adipose tissue with a #15 scalpel blade.  The skin margins were undermined to an appropriate distance in all directions around the primary defect and laterally outward around the flap utilizing iris scissors.
Referred To Asc For Closure Text (Leave Blank If You Do Not Want): After obtaining clear surgical margins the patient was sent to an ASC for surgical repair.  The patient understands they will receive post-surgical care and follow-up from the ASC physician.
Spiral Flap Text: The defect edges were debeveled with a #15 scalpel blade.  Given the location of the defect, shape of the defect and the proximity to free margins a spiral flap was deemed most appropriate.  Using a sterile surgical marker, an appropriate rotation flap was drawn incorporating the defect and placing the expected incisions within the relaxed skin tension lines where possible. The area thus outlined was incised deep to adipose tissue with a #15 scalpel blade.  The skin margins were undermined to an appropriate distance in all directions utilizing iris scissors.
Home Suture Removal Text: Patient was provided instructions on removing sutures and will remove their sutures at home.  If they have any questions or difficulties they will call the office.
Bilateral Helical Rim Advancement Flap Text: The defect edges were debeveled with a #15 blade scalpel.  Given the location of the defect and the proximity to free margins (helical rim) a bilateral helical rim advancement flap was deemed most appropriate.  Using a sterile surgical marker, the appropriate advancement flaps were drawn incorporating the defect and placing the expected incisions between the helical rim and antihelix where possible.  The area thus outlined was incised through and through with a #15 scalpel blade.  With a skin hook and iris scissors, the flaps were gently and sharply undermined and freed up.
V-Y Plasty Text: The defect edges were debeveled with a #15 scalpel blade.  Given the location of the defect, shape of the defect and the proximity to free margins an V-Y advancement flap was deemed most appropriate.  Using a sterile surgical marker, an appropriate advancement flap was drawn incorporating the defect and placing the expected incisions within the relaxed skin tension lines where possible.    The area thus outlined was incised deep to adipose tissue with a #15 scalpel blade.  The skin margins were undermined to an appropriate distance in all directions utilizing iris scissors.
Subsequent Stages Histo Method Verbiage: Using a similar technique to that described above, a thin layer of tissue was removed from all areas where tumor was visible on the previous stage.  The tissue was again oriented, mapped, dyed, and processed as above.
Epidermal Closure: running
Cheek Interpolation Flap Text: A decision was made to reconstruct the defect utilizing an interpolation axial flap and a staged reconstruction.  A telfa template was made of the defect.  This telfa template was then used to outline the Cheek Interpolation flap.  The donor area for the pedicle flap was then injected with anesthesia.  The flap was excised through the skin and subcutaneous tissue down to the layer of the underlying musculature.  The interpolation flap was carefully excised within this deep plane to maintain its blood supply.  The edges of the donor site were undermined.   The donor site was closed in a primary fashion.  The pedicle was then rotated into position and sutured.  Once the tube was sutured into place, adequate blood supply was confirmed with blanching and refill.  The pedicle was then wrapped with xeroform gauze and dressed appropriately with a telfa and gauze bandage to ensure continued blood supply and protect the attached pedicle.
Mohs Rapid Report Verbiage: The area of clinically evident tumor was marked with skin marking ink and appropriately hatched.  The initial incision was made following the Mohs approach through the skin.  The specimen was taken to the lab, divided into the necessary number of pieces, chromacoded and processed according to the Mohs protocol.  This was repeated in successive stages until a tumor free defect was achieved.
O-L Flap Text: The defect edges were debeveled with a #15 scalpel blade.  Given the location of the defect, shape of the defect and the proximity to free margins an O-L flap was deemed most appropriate.  Using a sterile surgical marker, an appropriate advancement flap was drawn incorporating the defect and placing the expected incisions within the relaxed skin tension lines where possible.    The area thus outlined was incised deep to adipose tissue with a #15 scalpel blade.  The skin margins were undermined to an appropriate distance in all directions utilizing iris scissors.

## 2017-06-21 ENCOUNTER — APPOINTMENT (RX ONLY)
Dept: URBAN - METROPOLITAN AREA CLINIC 23 | Facility: CLINIC | Age: 72
Setting detail: DERMATOLOGY
End: 2017-06-21

## 2017-06-21 DIAGNOSIS — Z48.01 ENCOUNTER FOR CHANGE OR REMOVAL OF SURGICAL WOUND DRESSING: ICD-10-CM

## 2017-06-21 PROBLEM — L85.3 XEROSIS CUTIS: Status: ACTIVE | Noted: 2017-06-21

## 2017-06-21 PROCEDURE — ? SUTURE REMOVAL (GLOBAL PERIOD)

## 2017-06-21 ASSESSMENT — LOCATION ZONE DERM: LOCATION ZONE: FACE

## 2017-06-21 ASSESSMENT — LOCATION DETAILED DESCRIPTION DERM: LOCATION DETAILED: LEFT CENTRAL TEMPLE

## 2017-06-21 ASSESSMENT — LOCATION SIMPLE DESCRIPTION DERM: LOCATION SIMPLE: LEFT TEMPLE

## 2017-06-21 NOTE — PROCEDURE: SUTURE REMOVAL (GLOBAL PERIOD)
Add 96509 Cpt? (Important Note: In 2017 The Use Of 62959 Is Being Tracked By Cms To Determine Future Global Period Reimbursement For Global Periods): no
Detail Level: Detailed

## 2017-10-03 ENCOUNTER — HOSPITAL ENCOUNTER (OUTPATIENT)
Dept: PHYSICAL THERAPY | Age: 72
Discharge: HOME | End: 2017-10-03
Attending: OBSTETRICS & GYNECOLOGY
Payer: MEDICARE

## 2017-10-03 DIAGNOSIS — N95.2 VAGINAL ATROPHY: ICD-10-CM

## 2017-10-03 DIAGNOSIS — N99.3 VAGINAL VAULT PROLAPSE AFTER HYSTERECTOMY: ICD-10-CM

## 2017-10-03 PROCEDURE — G8988 SELF CARE GOAL STATUS: HCPCS

## 2017-10-03 PROCEDURE — 97110 THERAPEUTIC EXERCISES: CPT

## 2017-10-03 PROCEDURE — 97161 PT EVAL LOW COMPLEX 20 MIN: CPT

## 2017-10-03 PROCEDURE — G8987 SELF CARE CURRENT STATUS: HCPCS

## 2017-10-03 NOTE — PROGRESS NOTES
Ambulatory/Rehab Services H2 Model Falls Risk Assessment    Risk Factor Pts. ·   Confusion/Disorientation/Impulsivity  []    4 ·   Symptomatic Depression  []   2 ·   Altered Elimination  []   1 ·   Dizziness/Vertigo  []   1 ·   Gender (Male)  []   1 ·   Any administered antiepileptics (anticonvulsants):  []   2 ·   Any administered benzodiazepines:  []   1 ·   Visual Impairment (specify):  []   1 ·   Portable Oxygen Use  []   1 ·   Orthostatic ? BP  []   1 ·   History of Recent Falls (within 3 mos.)  []   5     Ability to Rise from Chair (choose one) Pts. ·   Ability to rise in a single movement  [x]   0 ·   Pushes up, successful in one attempt  []   1 ·   Multiple attempts, but successful  []   3 ·   Unable to rise without assistance  []   4   Total: (5 or greater = High Risk) 0     Falls Prevention Plan:   []                Physical Limitations to Exercise (specify):   []                Mobility Assistance Device (type):   []                Exercise/Equipment Adaptation (specify):    ©2010 [facility] [address] Patent #[id number].  Federal Law prohibits the replication, distribution or use without written permission from [facility] [employer]

## 2017-10-03 NOTE — PROGRESS NOTES
Traci Fairbanks  : 1945 [facility]  [address], [address] [address],[address], [address] [employer] [facility]  [phone]   [phone]          OUTPATIENT PHYSICAL THERAPY:Initial Assessment 10/3/2017    ICD-10: Treatment Diagnosis:  Muscle weakness (generalized) (M62.)  Precautions/Allergies:   Latex and Nickel   Fall Risk Score: 0 (? 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 Eddie Benitez is a 73year old female who has core and pelvic floor mm weakness consistant with pelvic pressure. She presents with decreased pelvic floor strength and endurance of the PF mm. She presents with poor core strength. Pt would benefit from skilled therapy to address these deficits and reduce pelvic pressure with activities. PROBLEM LIST (Impacting functional limitations):  1. Decreased Strength  2. Decreased ADL/Functional Activities  3. Decreased [place] with Home Exercise Program   4. Decreased strength of pelvic floor which contributes to pelvic pressure INTERVENTIONS PLANNED:  1. Neuromuscular re-education  2. Biofeedback  3. HEP  4. Home Exercise Program (HEP)  5. Neuromuscular Re-education/Strengthening  6. Range of Motion (ROM)  7. Therapeutic Activites  8. Therapeutic Exercise/Strengthening   TREATMENT PLAN:  Effective Dates: 10/2/2017 TO 2017. Frequency/Duration: 1 time a week for 12 weeks  GOALS: (Goals have been discussed and agreed upon with patient.)  Short-Term Functional Goals: Time Frame: in 3 weeks  1. Patient will demonstrate independence with home exercise program.  2. Patient will demonstrated good coordination of pelvic floor muscles to improve Kegel. Discharge Goals: Time Frame: in 12 weeks  1.  Pt will demonstrate an increase in PFM endurance from 4 hold to 10 hold x 10 reps as measured by EMG within 6 weeks in order to improve PFM activity and thus decrease pelvic pressure  2. Patient will be able to perform basic ADL without pelvic pressure  3. Patient will increase pelvic floor strength from 2/5 to 4/5. Rehabilitation Potential For Stated Goals: Excellent  Regarding Kamron Fairbanks's therapy, I certify that the treatment plan above will be carried out by a therapist or under their direction. Thank you for this referral,  Ovidio Vela PT     Referring Physician Signature: Jailene Comer MD              Date                    The information in this section was collected on 10/3/2017 (except where otherwise noted). HISTORY:   Present Symptoms:  Pain Intensity 1: 0 Pelvic pressure. The more she is up and moving will feel pressure. Increased pressure with walking, lifting, and standing. Unable to vacuum or sweep for any period of time without pressure. Rides stationary bike 6 miles a day. History of Present Injury/Illness (Reason for Referral): Has been having pelvic pressure. Has been told that her vagina is dropping. Has had her mom for the past 5 years. Past Medical History/Comorbidities:   Ms. Aury Soto  has a past medical history of Arthritis; Depressive disorder, not elsewhere classified (8/20/2015); GERD (gastroesophageal reflux disease); Insomnia, unspecified (8/20/2015); Unspecified adverse effect of anesthesia; and Unspecified essential hypertension (8/20/2015). She also has no past medical history of Arrhythmia; Asthma; Autoimmune disease ([address] CAD (coronary artery disease); Cancer ([address] Chronic kidney disease; Chronic pain; COPD; Diabetes ([address] Difficult intubation; Heart failure ([address] Liver disease; Malignant hyperthermia due to anesthesia; Nausea & vomiting; Other ill-defined conditions; Pseudocholinesterase deficiency; PUD (peptic ulcer disease); Seizures ([address] Stroke [facility]);  Thromboembolus ([address] Thyroid disease; or Unspecified sleep apnea. Ms. Macho Lubin  has a past surgical history that includes orthopaedic (Right); colonoscopy (10/12/2016); tonsillectomy (age 27); total abdominal hysterectomy (20 yrs ago); and lipoma resection (Left). Knee replacement 9 years. 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 Current Medications:    Current Outpatient Prescriptions:     estradiol (ESTRACE) 0.01 % (0.1 mg/gram) vaginal cream, Insert 2 g into vagina daily. , Disp: , Rfl:     ALPRAZolam (XANAX) 0.5 mg tablet, Take 1 Tab by mouth every twelve (12) hours as needed for Anxiety. Max Daily Amount: 1 mg., Disp: 60 Tab, Rfl: 5    levocetirizine (XYZAL) 5 mg tablet, Take  by mouth., Disp: , Rfl:     therapeutic multivitamin (THERAGRAN) tablet, Take 1 Tab by mouth daily. , Disp: , Rfl:    Date Last Reviewed:  10/3/2017  Gynecological History:   · Number of pregnancies: 1, vaginal 1  · Episiotomy: grade 2  Past Urinary Medical History:    · History of UTI, Menopause: Gynecologist stated that ovaries are dried up   · Previous Treatments: using Estrase   Incontinence History:  Feels pressure and has been using Estrase and it is helpful. Voiding Patterns:  Patient voids every 2-3 hours during the day and 2-3 times during the night. Patient reports that she empties bladder. Fluid Intake:  Patient drinks 6 oz fluid per day. \  Patient not limit fluid intake to control bladder. Bowel Habits:  Patient demonstrates normal bowel habits. Mobility / Self Care: I  Personal / Social History:  Sexually active?  Not currently     Number of Personal Factors/Comorbidities that affect the Plan of Care: 1-2: MODERATE COMPLEXITY   EXAMINATION:   External Observation:   · Voluntary Contraction: present  · Voluntary Relaxation: present  · Involuntary Contraction: not present  · Involuntary Relaxation: not tested  · Perineal Body Assessment: slight decent  · Anal Gabriels: absent B  · Skin Integrity: vaginal atrophy  · Q-tip Test: no pain noted   · Vaginal Vault Size: slight increase    [place] PERFECT (Power/Endurnace/Repetitions/Fast Twitch/Elevation/Co-contraction/Timing) Scale:   · [place] PERFECT = 2/4/10/8/P/P/P  · Tissue support test with bearing down:  Grade 1: not visible at introitus; Grade 2: visible at introitus; Grade 3: tissue outside introitus  · Anterior Wall = 1   · Posterior Wall = 0   · Vaginal vault = 1   Lumbar Assessment (Strength):  Core strength: Grossly 3/5 with manual muscle testing    Palpation:  Poor coordination with pelvic floor contraction   Body Structures Involved:  1. Muscles Body Functions Affected:  1. Genitourinary  2. Neuromusculoskeletal Activities and Participation Affected:  1. Self Care  2. Interpersonal Interactions and Relationships  3. Community, Social and [place]   Number of elements that affect the Plan of Care: 3: MODERATE COMPLEXITY   CLINICAL PRESENTATION:   Presentation: Stable and uncomplicated: LOW COMPLEXITY   CLINICAL DECISION MAKING:   Outcome Measure: Tool Used: Pelvic Floor Impact Questionnaire--short form 7 (PFIQ-7)   Score:  Initial:   · Bladder or Urine: 0  · Bowel or Rectum: 0  · Vagina or Pelvis: 6 Most Recent: X (Date: -- )  · Bladder or Urine: X  · Bowel or Rectum: X  · Vagina or Pelvis: X   Interpretation of Score: Each of the 7 sections is scored on a scale from 0-3; 0 representing \"Not at all\", 3 representing \"Quite a bit\". The mean value is taken from all the answered items, then multiplied by 100 to obtain the scale score, ranging from 0-100. This process is repeated for each column representing bowel, bladder, and pelvic pain. ?  Self Care:     - CURRENT STATUS: CJ - 20%-39% impaired, limited or restricted    - GOAL STATUS: CI - 1%-19% impaired, limited or restricted    - D/C STATUS:  ---------------To be determined---------------     Medical Necessity: · Patient is expected to demonstrate progress in strength and coordination to decrease assistance required with Kegel. · Patient demonstrates good rehab potential due to higher previous functional level. Reason for Services/Other Comments:  · Patient continues to require skilled intervention due to pelvic pressure. Use of outcome tool(s) and clinical judgement create a POC that gives a: Clear prediction of patient's progress: LOW COMPLEXITY   TREATMENT:   (In addition to Assessment/Re-Assessment sessions the following treatments were rendered)  Pre-treatment Symptoms/Complaints:  Pelvic pressure at with activities. No current pain  Pain: Initial: Pain Intensity 1: 0  Post Session:  0     THERAPEUTIC EXERCISE: (20 minutes):  Exercises per grid below to improve strength and coordination. Required minimal verbal and tactile cues to promote proper body breathing techniques. Progressed complexity of movement as indicated. Exercises:  Patient instructed in pelvic floor exercises listed below:   Date:  10/3/2017 Date:   Date:     Activity/Exercise Parameters Parameters Parameters   Pt education  10 min     Kegel in supine with tactile feedback    4 sec on 4 sec off x 10 x2  2 sec on 2 off x 10                                       The following educational topics were reviewed with patient:  Prolapse education, pelvic floor anatomy  Treatment/Session Assessment:    · Response to Treatment:   Pt reported good understanding of plan of care as well as exercises. All questions were answered and pt. Invited to call with any further questions or issues. · Compliance with Program/Exercises: Will assess as treatment progresses. · Recommendations/Intent for next treatment session: \"Next visit will focus on advancements to more challenging activities\".   Total Treatment Duration:  PT Patient Time In/Time Out  Time In: 1300  Time Out: 1355  Maggie Gonzalez, PT

## 2017-10-11 ENCOUNTER — HOSPITAL ENCOUNTER (OUTPATIENT)
Dept: PHYSICAL THERAPY | Age: 72
Discharge: HOME | End: 2017-10-11
Attending: OBSTETRICS & GYNECOLOGY
Payer: MEDICARE

## 2017-10-11 DIAGNOSIS — N95.2 VAGINAL ATROPHY: ICD-10-CM

## 2017-10-11 DIAGNOSIS — N99.3 VAGINAL VAULT PROLAPSE AFTER HYSTERECTOMY: ICD-10-CM

## 2017-10-11 PROCEDURE — 97110 THERAPEUTIC EXERCISES: CPT

## 2017-10-11 NOTE — PROGRESS NOTES
Tricia Fairbanks  : 1945 [facility], [address] [address],[address], [address] [facility]  [phone]   [phone]          OUTPATIENT PHYSICAL THERAPY:Daily Note 10/11/2017    ICD-10: Treatment Diagnosis:  Muscle weakness (generalized) (M62.)  Precautions/Allergies:   Latex and Nickel   Fall Risk Score: 0 (? 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 Mary Jacobs is a 73year old female who has core and pelvic floor mm weakness consistant with pelvic pressure. She presents with decreased pelvic floor strength and endurance of the PF mm. She presents with poor core strength. Pt would benefit from skilled therapy to address these deficits and reduce pelvic pressure with activities. PROBLEM LIST (Impacting functional limitations):  1. Decreased Strength  2. Decreased ADL/Functional Activities  3. Decreased [place] with Home Exercise Program   4. Decreased strength of pelvic floor which contributes to pelvic pressure INTERVENTIONS PLANNED:  1. Neuromuscular re-education  2. Biofeedback  3. HEP  4. Home Exercise Program (HEP)  5. Neuromuscular Re-education/Strengthening  6. Range of Motion (ROM)  7. Therapeutic Activites  8. Therapeutic Exercise/Strengthening   TREATMENT PLAN:  Effective Dates: 10/2/2017 TO 2017. Frequency/Duration: 1 time a week for 12 weeks  GOALS: (Goals have been discussed and agreed upon with patient.)  Short-Term Functional Goals: Time Frame: in 3 weeks  1. Patient will demonstrate independence with home exercise program.  2. Patient will demonstrated good coordination of pelvic floor muscles to improve Kegel. Discharge Goals: Time Frame: in 12 weeks  1.  Pt will demonstrate an increase in PFM endurance from 4 hold to 10 hold x 10 reps as measured by EMG within 6 weeks in order to improve PFM activity and thus decrease pelvic pressure  2. Patient will be able to perform basic ADL without pelvic pressure  3. Patient will increase pelvic floor strength from 2/5 to 4/5. Rehabilitation Potential For Stated Goals: Excellent  Regarding Tiana Fairbanks's therapy, I certify that the treatment plan above will be carried out by a therapist or under their direction. Thank you for this referral,  Rohan Wiley PT     Referring Physician Signature: Arnol Toth MD              Date                    The information in this section was collected on 10/3/2017 (except where otherwise noted). HISTORY:   Present Symptoms:  Pain Intensity 1: 0 Pelvic pressure. The more she is up and moving will feel pressure. Increased pressure with walking, lifting, and standing. Unable to vacuum or sweep for any period of time without pressure. Rides stationary bike 6 miles a day. History of Present Injury/Illness (Reason for Referral): Has been having pelvic pressure. Has been told that her vagina is dropping. Has had her mom for the past 5 years. Past Medical History/Comorbidities:   Ms. Harlan Mcpherson  has a past medical history of Arthritis; Depressive disorder, not elsewhere classified (8/20/2015); GERD (gastroesophageal reflux disease); Insomnia, unspecified (8/20/2015); Unspecified adverse effect of anesthesia; and Unspecified essential hypertension (8/20/2015). She also has no past medical history of Arrhythmia; Asthma; Autoimmune disease ([address] CAD (coronary artery disease); Cancer ([address] Chronic kidney disease; Chronic pain; COPD; Diabetes ([address] Difficult intubation; Heart failure ([address] Liver disease; Malignant hyperthermia due to anesthesia; Nausea & vomiting; Other ill-defined conditions; Pseudocholinesterase deficiency; PUD (peptic ulcer disease); Seizures ([address] Stroke [facility]); Thromboembolus ([address]  Thyroid disease; or Unspecified sleep apnea. Ms. Romaine Pollard  has a past surgical history that includes orthopaedic (Right); colonoscopy (10/12/2016); tonsillectomy (age 27); total abdominal hysterectomy (20 yrs ago); and lipoma resection (Left). Knee replacement 9 years. 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 Current Medications:    Current Outpatient Prescriptions:     estradiol (ESTRACE) 0.01 % (0.1 mg/gram) vaginal cream, Insert 2 g into vagina daily. , Disp: , Rfl:     ALPRAZolam (XANAX) 0.5 mg tablet, Take 1 Tab by mouth every twelve (12) hours as needed for Anxiety. Max Daily Amount: 1 mg., Disp: 60 Tab, Rfl: 5    levocetirizine (XYZAL) 5 mg tablet, Take  by mouth., Disp: , Rfl:     therapeutic multivitamin (THERAGRAN) tablet, Take 1 Tab by mouth daily. , Disp: , Rfl:    Date Last Reviewed:  10/11/2017  Gynecological History:   · Number of pregnancies: 1, vaginal 1  · Episiotomy: grade 2  Past Urinary Medical History:    · History of UTI, Menopause: Gynecologist stated that ovaries are dried up   · Previous Treatments: using Estrase   Incontinence History:  Feels pressure and has been using Estrase and it is helpful. Voiding Patterns:  Patient voids every 2-3 hours during the day and 2-3 times during the night. Patient reports that she empties bladder. Fluid Intake:  Patient drinks 6 oz fluid per day. \  Patient not limit fluid intake to control bladder. Bowel Habits:  Patient demonstrates normal bowel habits. Mobility / Self Care: I  Personal / Social History:  Sexually active?  Not currently     Number of Personal Factors/Comorbidities that affect the Plan of Care: 1-2: MODERATE COMPLEXITY   EXAMINATION:   External Observation:   · Voluntary Contraction: present  · Voluntary Relaxation: present  · Involuntary Contraction: not present  · Involuntary Relaxation: not tested  · Perineal Body Assessment: slight decent  · Anal [place]: absent B  · Skin Integrity: vaginal atrophy  · Q-tip Test: no pain noted   · Vaginal Vault Size: slight increase    [place] PERFECT (Power/Endurnace/Repetitions/Fast Twitch/Elevation/Co-contraction/Timing) Scale:   · [place] PERFECT = 2/4/10/8/P/P/P  · Tissue support test with bearing down:  Grade 1: not visible at introitus; Grade 2: visible at introitus; Grade 3: tissue outside introitus  · Anterior Wall = 1   · Posterior Wall = 0   · Vaginal vault = 1   Lumbar Assessment (Strength):  Core strength: Grossly 3/5 with manual muscle testing    Palpation:  Poor coordination with pelvic floor contraction   Body Structures Involved:  1. Muscles Body Functions Affected:  1. Genitourinary  2. Neuromusculoskeletal Activities and Participation Affected:  1. Self Care  2. Interpersonal Interactions and Relationships  3. Community, Social and [place]   Number of elements that affect the Plan of Care: 3: MODERATE COMPLEXITY   CLINICAL PRESENTATION:   Presentation: Stable and uncomplicated: LOW COMPLEXITY   CLINICAL DECISION MAKING:   Outcome Measure: Tool Used: Pelvic Floor Impact Questionnaire--short form 7 (PFIQ-7)   Score:  Initial:   · Bladder or Urine: 0  · Bowel or Rectum: 0  · Vagina or Pelvis: 6 Most Recent: X (Date: -- )  · Bladder or Urine: X  · Bowel or Rectum: X  · Vagina or Pelvis: X   Interpretation of Score: Each of the 7 sections is scored on a scale from 0-3; 0 representing \"Not at all\", 3 representing \"Quite a bit\". The mean value is taken from all the answered items, then multiplied by 100 to obtain the scale score, ranging from 0-100. This process is repeated for each column representing bowel, bladder, and pelvic pain. ?  Self Care:     - CURRENT STATUS: CJ - 20%-39% impaired, limited or restricted    - GOAL STATUS: CI - 1%-19% impaired, limited or restricted    - D/C STATUS:  ---------------To be determined---------------     Medical Necessity:   · Patient is expected to demonstrate progress in strength and coordination to decrease assistance required with Kegel. · Patient demonstrates good rehab potential due to higher previous functional level. Reason for Services/Other Comments:  · Patient continues to require skilled intervention due to pelvic pressure. Use of outcome tool(s) and clinical judgement create a POC that gives a: Clear prediction of patient's progress: LOW COMPLEXITY   TREATMENT:   (In addition to Assessment/Re-Assessment sessions the following treatments were rendered)  Pre-treatment Symptoms/Complaints: Pt has some increase in pelvic pressure after treatment the first day that subsided quickly. Has not been moving her mother much as she is currently in hospice. Pain: Initial: Pain Intensity 1: 0  Post Session:  0     THERAPEUTIC EXERCISE: (20 minutes):  Exercises per grid below to improve strength and coordination. Required minimal verbal and tactile cues to promote proper body breathing techniques. Progressed complexity of movement as indicated. Exercises:  Patient instructed in pelvic floor exercises listed below:   Date:  10/3/2017 Date:  10/11/2017 Date:     Activity/Exercise Parameters Parameters Parameters   Pt education  10 min     Kegel in supine with tactile feedback    4 sec on 4 sec off x 10 x2  2 sec on 2 off x 10 5 sec on 10 sec off x 10 x2  2 sec on 2 off x 10    Kegel in supine with biofeedback assist for visual feedback via internal vaginal sensors    5 sec on 10 sec off x 10 x2  2 sec on 2 off x 10    NMES with Kegel  50 Hz 5 on 10 off  5 min                           The following educational topics were reviewed with patient:  Prolapse education, pelvic floor anatomy  Treatment/Session Assessment:    · Response to Treatment:   Pt tolerated treatment well. No pain with exercises. · Compliance with Program/Exercises: Will assess as treatment progresses. · Recommendations/Intent for next treatment session:  \"Next visit will focus on advancements to more challenging activities\".   Total Treatment Duration:  PT Patient Time In/Time Out  Time In: 1230  Time Out: 1300  Yakov Iniguez PT

## 2017-10-17 ENCOUNTER — APPOINTMENT (OUTPATIENT)
Dept: PHYSICAL THERAPY | Age: 72
End: 2017-10-17
Attending: OBSTETRICS & GYNECOLOGY
Payer: MEDICARE

## 2017-10-31 ENCOUNTER — HOSPITAL ENCOUNTER (OUTPATIENT)
Dept: PHYSICAL THERAPY | Age: 72
Discharge: HOME | End: 2017-10-31
Attending: OBSTETRICS & GYNECOLOGY
Payer: MEDICARE

## 2017-10-31 DIAGNOSIS — N99.3 VAGINAL VAULT PROLAPSE AFTER HYSTERECTOMY: ICD-10-CM

## 2017-10-31 DIAGNOSIS — N95.2 VAGINAL ATROPHY: ICD-10-CM

## 2017-10-31 PROCEDURE — 97110 THERAPEUTIC EXERCISES: CPT

## 2017-10-31 NOTE — PROGRESS NOTES
Gregg Fairbanks  : 1945 [facility] at [address] [address], [address] [facility]  [phone]   [phone]          OUTPATIENT PHYSICAL THERAPY:Daily Note 10/31/2017    ICD-10: Treatment Diagnosis:  Muscle weakness (generalized) (M62.)  Precautions/Allergies:   Latex and Nickel   Fall Risk Score: 0 (? 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 Carey Christensen is a 73year old female who has core and pelvic floor mm weakness consistant with pelvic pressure. She presents with decreased pelvic floor strength and endurance of the PF mm. She presents with poor core strength. Pt would benefit from skilled therapy to address these deficits and reduce pelvic pressure with activities. PROBLEM LIST (Impacting functional limitations):  1. Decreased Strength  2. Decreased ADL/Functional Activities  3. Decreased [place] with Home Exercise Program   4. Decreased strength of pelvic floor which contributes to pelvic pressure INTERVENTIONS PLANNED:  1. Neuromuscular re-education  2. Biofeedback  3. HEP  4. Home Exercise Program (HEP)  5. Neuromuscular Re-education/Strengthening  6. Range of Motion (ROM)  7. Therapeutic Activites  8. Therapeutic Exercise/Strengthening   TREATMENT PLAN:  Effective Dates: 10/2/2017 TO 2017. Frequency/Duration: 1 time a week for 12 weeks  GOALS: (Goals have been discussed and agreed upon with patient.)  Short-Term Functional Goals: Time Frame: in 3 weeks  1. Patient will demonstrate independence with home exercise program.  2. Patient will demonstrated good coordination of pelvic floor muscles to improve Kegel. Discharge Goals: Time Frame: in 12 weeks  1.  Pt will demonstrate an increase in PFM endurance from 4 hold to 10 hold x 10 reps as measured by EMG within 6 weeks in order to improve PFM activity and thus decrease pelvic pressure  2. Patient will be able to perform basic ADL without pelvic pressure  3. Patient will increase pelvic floor strength from 2/5 to 4/5. Rehabilitation Potential For Stated Goals: Excellent  Regarding Roland Fairbanks's therapy, I certify that the treatment plan above will be carried out by a therapist or under their direction. Thank you for this referral,  Edu Lawrence PT     Referring Physician Signature: Josefina Jones MD              Date                    The information in this section was collected on 10/3/2017 (except where otherwise noted). HISTORY:   Present Symptoms:  Pain Intensity 1: 0 Pelvic pressure. The more she is up and moving will feel pressure. Increased pressure with walking, lifting, and standing. Unable to vacuum or sweep for any period of time without pressure. Rides stationary bike 6 miles a day. History of Present Injury/Illness (Reason for Referral): Has been having pelvic pressure. Has been told that her vagina is dropping. Has had her mom for the past 5 years. Past Medical History/Comorbidities:   Ms. Stefany Cedeno  has a past medical history of Arthritis; Depressive disorder, not elsewhere classified (8/20/2015); GERD (gastroesophageal reflux disease); Insomnia, unspecified (8/20/2015); Unspecified adverse effect of anesthesia; and Unspecified essential hypertension (8/20/2015). She also has no past medical history of Arrhythmia; Asthma; Autoimmune disease ([address] CAD (coronary artery disease); Cancer ([address] Chronic kidney disease; Chronic pain; COPD; Diabetes ([address] Difficult intubation; Heart failure ([address] Liver disease; Malignant hyperthermia due to anesthesia; Nausea & vomiting; Other ill-defined conditions; Pseudocholinesterase deficiency; PUD (peptic ulcer disease); Seizures ([address] Stroke [facility]); Thromboembolus ([address]  Thyroid disease; or Unspecified sleep apnea. Ms. Michelle Toussaint  has a past surgical history that includes orthopaedic (Right); colonoscopy (10/12/2016); tonsillectomy (age 27); total abdominal hysterectomy (20 yrs ago); and lipoma resection (Left). Knee replacement 9 years. 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 Current Medications:    Current Outpatient Prescriptions:     estradiol (ESTRACE) 0.01 % (0.1 mg/gram) vaginal cream, Insert 2 g into vagina daily. , Disp: , Rfl:     ALPRAZolam (XANAX) 0.5 mg tablet, Take 1 Tab by mouth every twelve (12) hours as needed for Anxiety. Max Daily Amount: 1 mg., Disp: 60 Tab, Rfl: 5    levocetirizine (XYZAL) 5 mg tablet, Take  by mouth., Disp: , Rfl:     therapeutic multivitamin (THERAGRAN) tablet, Take 1 Tab by mouth daily. , Disp: , Rfl:    Date Last Reviewed:  10/31/2017  Gynecological History:   · Number of pregnancies: 1, vaginal 1  · Episiotomy: grade 2  Past Urinary Medical History:    · History of UTI, Menopause: Gynecologist stated that ovaries are dried up   · Previous Treatments: using Estrase   Incontinence History:  Feels pressure and has been using Estrase and it is helpful. Voiding Patterns:  Patient voids every 2-3 hours during the day and 2-3 times during the night. Patient reports that she empties bladder. Fluid Intake:  Patient drinks 6 oz fluid per day. \  Patient not limit fluid intake to control bladder. Bowel Habits:  Patient demonstrates normal bowel habits. Mobility / Self Care: I  Personal / Social History:  Sexually active?  Not currently     Number of Personal Factors/Comorbidities that affect the Plan of Care: 1-2: MODERATE COMPLEXITY   EXAMINATION:   External Observation:   · Voluntary Contraction: present  · Voluntary Relaxation: present  · Involuntary Contraction: not present  · Involuntary Relaxation: not tested  · Perineal Body Assessment: slight decent  · Anal [place]: absent B  · Skin Integrity: vaginal atrophy  · Q-tip Test: no pain noted   · Vaginal Vault Size: slight increase    [place] PERFECT (Power/Endurnace/Repetitions/Fast Twitch/Elevation/Co-contraction/Timing) Scale:   · [place] PERFECT = 2/4/10/8/P/P/P  · Tissue support test with bearing down:  Grade 1: not visible at introitus; Grade 2: visible at introitus; Grade 3: tissue outside introitus  · Anterior Wall = 1   · Posterior Wall = 0   · Vaginal vault = 1   Lumbar Assessment (Strength):  Core strength: Grossly 3/5 with manual muscle testing    Palpation:  Poor coordination with pelvic floor contraction   Body Structures Involved:  1. Muscles Body Functions Affected:  1. Genitourinary  2. Neuromusculoskeletal Activities and Participation Affected:  1. Self Care  2. Interpersonal Interactions and Relationships  3. Community, Social and [place]   Number of elements that affect the Plan of Care: 3: MODERATE COMPLEXITY   CLINICAL PRESENTATION:   Presentation: Stable and uncomplicated: LOW COMPLEXITY   CLINICAL DECISION MAKING:   Outcome Measure: Tool Used: Pelvic Floor Impact Questionnaire--short form 7 (PFIQ-7)   Score:  Initial:   · Bladder or Urine: 0  · Bowel or Rectum: 0  · Vagina or Pelvis: 6 Most Recent: X (Date: -- )  · Bladder or Urine: X  · Bowel or Rectum: X  · Vagina or Pelvis: X   Interpretation of Score: Each of the 7 sections is scored on a scale from 0-3; 0 representing \"Not at all\", 3 representing \"Quite a bit\". The mean value is taken from all the answered items, then multiplied by 100 to obtain the scale score, ranging from 0-100. This process is repeated for each column representing bowel, bladder, and pelvic pain. ?  Self Care:     - CURRENT STATUS: CJ - 20%-39% impaired, limited or restricted    - GOAL STATUS: CI - 1%-19% impaired, limited or restricted    - D/C STATUS:  ---------------To be determined---------------     Medical Necessity:   · Patient is expected to demonstrate progress in strength and coordination to decrease assistance required with Kegel. · Patient demonstrates good rehab potential due to higher previous functional level. Reason for Services/Other Comments:  · Patient continues to require skilled intervention due to pelvic pressure. Use of outcome tool(s) and clinical judgement create a POC that gives a: Clear prediction of patient's progress: LOW COMPLEXITY   TREATMENT:   (In addition to Assessment/Re-Assessment sessions the following treatments were rendered)  Pre-treatment Symptoms/Complaints: Her mother has just passed away and she has not been as diligent with her exercise. Has been very busy cleaning out her mother's house. Had some days with prolapse and others without. Pain: Initial: Pain Intensity 1: 0  Post Session:  0     THERAPEUTIC EXERCISE: (55 minutes):  Exercises per grid below to improve strength and coordination. Required minimal verbal and tactile cues to promote proper body breathing techniques. Progressed complexity of movement as indicated.     Exercises:  Patient instructed in pelvic floor exercises listed below:   Date:  10/3/2017 Date:  10/11/2017 Date:  10/31/2017   Activity/Exercise Parameters Parameters Parameters   Pt education  10 min  15 min   Kegel in supine with tactile feedback    4 sec on 4 sec off x 10 x2  2 sec on 2 off x 10 5 sec on 10 sec off x 10 x2  2 sec on 2 off x 10 5 sec on 10 sec off x 10 x2  2 sec on 2 off x 10   Kegel in supine with biofeedback assist for visual feedback via internal vaginal sensors    5 sec on 10 sec off x 10 x2  2 sec on 2 off x 10 5 sec on 10 sec off x 10 x2  2 sec on 2 off x 10   NMES with Kegel  50 Hz 5 on 10 off  5 min  50 Hz 5 on 10 off  5 min    Prone swan   X 10    Prone alt hip extension   X 10   Bridges   X 10    Single knee to chest   X 10 sec x 3        The following educational topics were reviewed with patient:  Prolapse education  Treatment/Session Assessment:    · Response to Treatment:   Pt tolerated treatment well. No pain with exercises. · Compliance with Program/Exercises: Will assess as treatment progresses. · Recommendations/Intent for next treatment session: \"Next visit will focus on advancements to more challenging activities\".   Total Treatment Duration:  PT Patient Time In/Time Out  Time In: 1050  Time Out: 1150  [facility]

## 2017-11-14 ENCOUNTER — HOSPITAL ENCOUNTER (OUTPATIENT)
Dept: PHYSICAL THERAPY | Age: 72
Discharge: HOME | End: 2017-11-14
Attending: OBSTETRICS & GYNECOLOGY
Payer: MEDICARE

## 2017-11-14 DIAGNOSIS — N95.2 VAGINAL ATROPHY: ICD-10-CM

## 2017-11-14 DIAGNOSIS — N99.3 VAGINAL VAULT PROLAPSE AFTER HYSTERECTOMY: ICD-10-CM

## 2017-11-14 PROCEDURE — 97110 THERAPEUTIC EXERCISES: CPT

## 2017-11-14 NOTE — PROGRESS NOTES
Norberto Fairbanks  : 1945 [facility] [address], [address] [place] [address],[address], [address]  [phone]   [phone]          OUTPATIENT PHYSICAL THERAPY:Daily Note 2017    ICD-10: Treatment Diagnosis:  Muscle weakness (generalized) (M62.)  Precautions/Allergies:   Latex and Nickel   Fall Risk Score: 0 (? 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 Michelle Toussaint is a 73year old female who has core and pelvic floor mm weakness consistant with pelvic pressure. She presents with decreased pelvic floor strength and endurance of the PF mm. She presents with poor core strength. Pt would benefit from skilled therapy to address these deficits and reduce pelvic pressure with activities. PROBLEM LIST (Impacting functional limitations):  1. Decreased Strength  2. Decreased ADL/Functional Activities  3. Decreased [place] with Home Exercise Program   4. Decreased strength of pelvic floor which contributes to pelvic pressure INTERVENTIONS PLANNED:  1. Neuromuscular re-education  2. Biofeedback  3. HEP  4. Home Exercise Program (HEP)  5. Neuromuscular Re-education/Strengthening  6. Range of Motion (ROM)  7. Therapeutic Activites  8. Therapeutic Exercise/Strengthening   TREATMENT PLAN:  Effective Dates: 10/2/2017 TO 2017. Frequency/Duration: 1 time a week for 12 weeks  GOALS: (Goals have been discussed and agreed upon with patient.)  Short-Term Functional Goals: Time Frame: in 3 weeks  1. Patient will demonstrate independence with home exercise program.  2. Patient will demonstrated good coordination of pelvic floor muscles to improve Kegel. Discharge Goals: Time Frame: in 12 weeks  1.  Pt will demonstrate an increase in PFM endurance from 4 hold to 10 hold x 10 reps as measured by EMG within 6 weeks in order to improve PFM activity and thus decrease pelvic pressure  2. Patient will be able to perform basic ADL without pelvic pressure  3. Patient will increase pelvic floor strength from 2/5 to 4/5. Rehabilitation Potential For Stated Goals: Excellent  Regarding Avis Fairbanks's therapy, I certify that the treatment plan above will be carried out by a therapist or under their direction. Thank you for this referral,  Sharyn Griffin PT     Referring Physician Signature: Ermias Hinson MD              Date                    The information in this section was collected on 10/3/2017 (except where otherwise noted). HISTORY:   Present Symptoms:  Pain Intensity 1: 0 Pelvic pressure. The more she is up and moving will feel pressure. Increased pressure with walking, lifting, and standing. Unable to vacuum or sweep for any period of time without pressure. Rides stationary bike 6 miles a day. History of Present Injury/Illness (Reason for Referral): Has been having pelvic pressure. Has been told that her vagina is dropping. Has had her mom for the past 5 years. Past Medical History/Comorbidities:   Ms. Ricky Flores  has a past medical history of Arthritis; Depressive disorder, not elsewhere classified (8/20/2015); GERD (gastroesophageal reflux disease); Insomnia, unspecified (8/20/2015); Unspecified adverse effect of anesthesia; and Unspecified essential hypertension (8/20/2015). She also has no past medical history of Arrhythmia; Asthma; Autoimmune disease ([address] CAD (coronary artery disease); Cancer ([address] Chronic kidney disease; Chronic pain; COPD; Diabetes ([address] Difficult intubation; Heart failure ([address] Liver disease; Malignant hyperthermia due to anesthesia; Nausea & vomiting; Other ill-defined conditions; Pseudocholinesterase deficiency; PUD (peptic ulcer disease); Seizures ([address] Stroke [facility]); Thromboembolus ([address]  Thyroid disease; or Unspecified sleep apnea. Ms. Fabián Stiles  has a past surgical history that includes orthopaedic (Right); colonoscopy (10/12/2016); tonsillectomy (age 27); total abdominal hysterectomy (20 yrs ago); and lipoma resection (Left). Knee replacement 9 years. 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 Current Medications:    Current Outpatient Prescriptions:     estradiol (ESTRACE) 0.01 % (0.1 mg/gram) vaginal cream, Insert 2 g into vagina daily. , Disp: , Rfl:     ALPRAZolam (XANAX) 0.5 mg tablet, Take 1 Tab by mouth every twelve (12) hours as needed for Anxiety. Max Daily Amount: 1 mg., Disp: 60 Tab, Rfl: 5    levocetirizine (XYZAL) 5 mg tablet, Take  by mouth., Disp: , Rfl:     therapeutic multivitamin (THERAGRAN) tablet, Take 1 Tab by mouth daily. , Disp: , Rfl:    Date Last Reviewed:  11/14/2017  Gynecological History:   · Number of pregnancies: 1, vaginal 1  · Episiotomy: grade 2  Past Urinary Medical History:    · History of UTI, Menopause: Gynecologist stated that ovaries are dried up   · Previous Treatments: using Estrase   Incontinence History:  Feels pressure and has been using Estrase and it is helpful. Voiding Patterns:  Patient voids every 2-3 hours during the day and 2-3 times during the night. Patient reports that she empties bladder. Fluid Intake:  Patient drinks 6 oz fluid per day. \  Patient not limit fluid intake to control bladder. Bowel Habits:  Patient demonstrates normal bowel habits. Mobility / Self Care: I  Personal / Social History:  Sexually active?  Not currently     Number of Personal Factors/Comorbidities that affect the Plan of Care: 1-2: MODERATE COMPLEXITY   EXAMINATION:   External Observation:   · Voluntary Contraction: present  · Voluntary Relaxation: present  · Involuntary Contraction: not present  · Involuntary Relaxation: not tested  · Perineal Body Assessment: slight decent  · Anal [place]: absent B  · Skin Integrity: vaginal atrophy  · Q-tip Test: no pain noted   · Vaginal Vault Size: slight increase    [place] PERFECT (Power/Endurnace/Repetitions/Fast Twitch/Elevation/Co-contraction/Timing) Scale:   · [place] PERFECT = 2/4/10/8/P/P/P  · Tissue support test with bearing down:  Grade 1: not visible at introitus; Grade 2: visible at introitus; Grade 3: tissue outside introitus  · Anterior Wall = 1   · Posterior Wall = 0   · Vaginal vault = 1   Lumbar Assessment (Strength):  Core strength: Grossly 3/5 with manual muscle testing    Palpation:  Poor coordination with pelvic floor contraction   Body Structures Involved:  1. Muscles Body Functions Affected:  1. Genitourinary  2. Neuromusculoskeletal Activities and Participation Affected:  1. Self Care  2. Interpersonal Interactions and Relationships  3. Community, Social and [place]   Number of elements that affect the Plan of Care: 3: MODERATE COMPLEXITY   CLINICAL PRESENTATION:   Presentation: Stable and uncomplicated: LOW COMPLEXITY   CLINICAL DECISION MAKING:   Outcome Measure: Tool Used: Pelvic Floor Impact Questionnaire--short form 7 (PFIQ-7)   Score:  Initial:   · Bladder or Urine: 0  · Bowel or Rectum: 0  · Vagina or Pelvis: 6 Most Recent: X (Date: -- )  · Bladder or Urine: X  · Bowel or Rectum: X  · Vagina or Pelvis: X   Interpretation of Score: Each of the 7 sections is scored on a scale from 0-3; 0 representing \"Not at all\", 3 representing \"Quite a bit\". The mean value is taken from all the answered items, then multiplied by 100 to obtain the scale score, ranging from 0-100. This process is repeated for each column representing bowel, bladder, and pelvic pain. ?  Self Care:     - CURRENT STATUS: CJ - 20%-39% impaired, limited or restricted    - GOAL STATUS: CI - 1%-19% impaired, limited or restricted    - D/C STATUS:  ---------------To be determined---------------     Medical Necessity:   · Patient is expected to demonstrate progress in strength and coordination to decrease assistance required with Kegel. · Patient demonstrates good rehab potential due to higher previous functional level. Reason for Services/Other Comments:  · Patient continues to require skilled intervention due to pelvic pressure. Use of outcome tool(s) and clinical judgement create a POC that gives a: Clear prediction of patient's progress: LOW COMPLEXITY   TREATMENT:   (In addition to Assessment/Re-Assessment sessions the following treatments were rendered)  Pre-treatment Symptoms/Complaints: Pt states that she had 3-4 good days. Then she had taken her Estrogen cream on Saturday nights and noticed increased prolapse on Sunday. Monday her symptoms were resolved with rest.  Feels like 85 % of the time she is better. Pain: Initial: Pain Intensity 1: 0  Post Session:  0     THERAPEUTIC EXERCISE: (55 minutes):  Exercises per grid below to improve strength and coordination. Required minimal verbal and tactile cues to promote proper body breathing techniques. Progressed complexity of movement as indicated.     Exercises:  Patient instructed in pelvic floor exercises listed below:   Date:  10/3/2017 Date:  10/11/2017 Date:  10/31/2017 Date:  11/14/2017   Activity/Exercise Parameters Parameters Parameters    Pt education  10 min  15 min    Kegel in supine with tactile feedback    4 sec on 4 sec off x 10 x2  2 sec on 2 off x 10 5 sec on 10 sec off x 10 x2  2 sec on 2 off x 10 5 sec on 10 sec off x 10 x2  2 sec on 2 off x 10 5 sec on 10 sec off x 10 x2  2 sec on 2 off x 10  Elevator exercises x 10   Kegel in supine with biofeedback assist for visual feedback via internal vaginal sensors    5 sec on 10 sec off x 10 x2  2 sec on 2 off x 10 5 sec on 10 sec off x 10 x2  2 sec on 2 off x 10 Elevator x 10 x2  2 sec on 2 off x 10   NMES with Kegel  50 Hz 5 on 10 off  5 min  50 Hz 5 on 10 off  5 min  50 Hz 5 on 10 off  8 min    Prone swan   X 10  DC due to lumbar pain    Prone alt hip extension   X 10 X 10    Bridges   X 10  X 10    Single knee to chest   X 10 sec x 3 X 10   TA sets     X 8   TA sets with marches    X 4 x 8        The following educational topics were reviewed with patient:  Prolapse education  Treatment/Session Assessment:    · Response to Treatment:  Pt continues to make good progress with coordination of the pelvic ffoor mm. Did well with TA sets today. Will add sitting exercises next visit. · Compliance with Program/Exercises: Will assess as treatment progresses. · Recommendations/Intent for next treatment session: \"Next visit will focus on advancements to more challenging activities\".   Total Treatment Duration:  PT Patient Time In/Time Out  Time In: 1230  Time Out: 1330  Khris Belle PT

## 2017-11-27 ENCOUNTER — APPOINTMENT (RX ONLY)
Dept: URBAN - METROPOLITAN AREA CLINIC 23 | Facility: CLINIC | Age: 72
Setting detail: DERMATOLOGY
End: 2017-11-27

## 2017-11-27 DIAGNOSIS — L81.4 OTHER MELANIN HYPERPIGMENTATION: ICD-10-CM

## 2017-11-27 DIAGNOSIS — Z85.828 PERSONAL HISTORY OF OTHER MALIGNANT NEOPLASM OF SKIN: ICD-10-CM

## 2017-11-27 DIAGNOSIS — D22 MELANOCYTIC NEVI: ICD-10-CM

## 2017-11-27 DIAGNOSIS — L82.1 OTHER SEBORRHEIC KERATOSIS: ICD-10-CM

## 2017-11-27 PROBLEM — D22.72 MELANOCYTIC NEVI OF LEFT LOWER LIMB, INCLUDING HIP: Status: ACTIVE | Noted: 2017-11-27

## 2017-11-27 PROBLEM — D22.5 MELANOCYTIC NEVI OF TRUNK: Status: ACTIVE | Noted: 2017-11-27

## 2017-11-27 PROBLEM — L85.3 XEROSIS CUTIS: Status: ACTIVE | Noted: 2017-11-27

## 2017-11-27 PROCEDURE — ? BODY PHOTOGRAPHY

## 2017-11-27 PROCEDURE — ? COUNSELING

## 2017-11-27 PROCEDURE — ? OTHER

## 2017-11-27 PROCEDURE — 99214 OFFICE O/P EST MOD 30 MIN: CPT

## 2017-11-27 ASSESSMENT — LOCATION DETAILED DESCRIPTION DERM
LOCATION DETAILED: LEFT DORSAL 4TH TOE
LOCATION DETAILED: LEFT DISTAL LATERAL POSTERIOR UPPER ARM
LOCATION DETAILED: RIGHT DISTAL POSTERIOR UPPER ARM
LOCATION DETAILED: INFERIOR THORACIC SPINE
LOCATION DETAILED: LEFT ULNAR DORSAL HAND
LOCATION DETAILED: LEFT INFERIOR CENTRAL MALAR CHEEK
LOCATION DETAILED: RIGHT LATERAL SUPERIOR CHEST
LOCATION DETAILED: RIGHT INFERIOR LATERAL LOWER BACK
LOCATION DETAILED: RIGHT RADIAL DORSAL HAND
LOCATION DETAILED: LEFT DISTAL PRETIBIAL REGION
LOCATION DETAILED: LEFT VENTRAL PROXIMAL FOREARM
LOCATION DETAILED: LEFT LATERAL TEMPLE

## 2017-11-27 ASSESSMENT — LOCATION ZONE DERM
LOCATION ZONE: TRUNK
LOCATION ZONE: HAND
LOCATION ZONE: LEG
LOCATION ZONE: FACE
LOCATION ZONE: TOE
LOCATION ZONE: ARM

## 2017-11-27 ASSESSMENT — LOCATION SIMPLE DESCRIPTION DERM
LOCATION SIMPLE: LEFT TEMPLE
LOCATION SIMPLE: RIGHT LOWER BACK
LOCATION SIMPLE: RIGHT POSTERIOR UPPER ARM
LOCATION SIMPLE: LEFT 4TH TOE
LOCATION SIMPLE: LEFT FOREARM
LOCATION SIMPLE: LEFT CHEEK
LOCATION SIMPLE: CHEST
LOCATION SIMPLE: LEFT HAND
LOCATION SIMPLE: UPPER BACK
LOCATION SIMPLE: LEFT POSTERIOR UPPER ARM
LOCATION SIMPLE: RIGHT HAND
LOCATION SIMPLE: LEFT PRETIBIAL REGION

## 2017-11-27 NOTE — PROCEDURE: MIPS QUALITY
Quality 130: Documentation Of Current Medications In The Medical Record: Current Medications Documented
Quality 110: Preventive Care And Screening: Influenza Immunization: Influenza Immunization previously received during influenza season
Detail Level: Simple
Quality 111:Pneumonia Vaccination Status For Older Adults: Pneumococcal Vaccination Previously Received

## 2017-11-27 NOTE — PROCEDURE: OTHER
Detail Level: Simple
Other (Free Text): Ln
Note Text (......Xxx Chief Complaint.): This diagnosis correlates with the

## 2017-11-28 ENCOUNTER — HOSPITAL ENCOUNTER (OUTPATIENT)
Dept: PHYSICAL THERAPY | Age: 72
Discharge: HOME | End: 2017-11-28
Attending: OBSTETRICS & GYNECOLOGY
Payer: MEDICARE

## 2017-11-28 DIAGNOSIS — N99.3 VAGINAL VAULT PROLAPSE AFTER HYSTERECTOMY: ICD-10-CM

## 2017-11-28 DIAGNOSIS — N95.2 VAGINAL ATROPHY: ICD-10-CM

## 2017-11-28 PROCEDURE — 97110 THERAPEUTIC EXERCISES: CPT

## 2017-11-28 NOTE — PROGRESS NOTES
Socorro Fairbanks  : 1945 [facility] at [place] [address] [facility], [address] [place] [address],[address], [facility] [address]  [phone]   [phone]          OUTPATIENT PHYSICAL THERAPY:Daily Note 2017    ICD-10: Treatment Diagnosis:  Muscle weakness (generalized) (M62.)  Precautions/Allergies:   Latex and Nickel   Fall Risk Score: 0 (? 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 Get Tomlinson is a 73year old female who has core and pelvic floor mm weakness consistant with pelvic pressure. She presents with decreased pelvic floor strength and endurance of the PF mm. She presents with poor core strength. Pt would benefit from skilled therapy to address these deficits and reduce pelvic pressure with activities. PROBLEM LIST (Impacting functional limitations):  1. Decreased Strength  2. Decreased ADL/Functional Activities  3. Decreased [place] with Home Exercise Program   4. Decreased strength of pelvic floor which contributes to pelvic pressure INTERVENTIONS PLANNED:  1. Neuromuscular re-education  2. Biofeedback  3. HEP  4. Home Exercise Program (HEP)  5. Neuromuscular Re-education/Strengthening  6. Range of Motion (ROM)  7. Therapeutic Activites  8. Therapeutic Exercise/Strengthening   TREATMENT PLAN:  Effective Dates: 10/2/2017 TO 2017. Frequency/Duration: 1 time a week for 12 weeks  GOALS: (Goals have been discussed and agreed upon with patient.)  Short-Term Functional Goals: Time Frame: in 3 weeks  1. Patient will demonstrate independence with home exercise program.  2. Patient will demonstrated good coordination of pelvic floor muscles to improve Kegel. Discharge Goals: Time Frame: in 12 weeks  1.  Pt will demonstrate an increase in PFM endurance from 4 hold to 10 hold x 10 reps as measured by EMG within 6 weeks in order to improve PFM activity and thus decrease pelvic pressure  2. Patient will be able to perform basic ADL without pelvic pressure  3. Patient will increase pelvic floor strength from 2/5 to 4/5. Rehabilitation Potential For Stated Goals: Excellent  Regarding Larry Fairbanks's therapy, I certify that the treatment plan above will be carried out by a therapist or under their direction. Thank you for this referral,  Eagle Estrada PT     Referring Physician Signature: Neal Niño MD              Date                    The information in this section was collected on 10/3/2017 (except where otherwise noted). HISTORY:   Present Symptoms:  Pain Intensity 1: 0 Pelvic pressure. The more she is up and moving will feel pressure. Increased pressure with walking, lifting, and standing. Unable to vacuum or sweep for any period of time without pressure. Rides stationary bike 6 miles a day. History of Present Injury/Illness (Reason for Referral): Has been having pelvic pressure. Has been told that her vagina is dropping. Has had her mom for the past 5 years. Past Medical History/Comorbidities:   Ms. Romaine Pollard  has a past medical history of Arthritis; Depressive disorder, not elsewhere classified (8/20/2015); GERD (gastroesophageal reflux disease); Insomnia, unspecified (8/20/2015); Unspecified adverse effect of anesthesia; and Unspecified essential hypertension (8/20/2015). She also has no past medical history of Arrhythmia; Asthma; Autoimmune disease ([address] CAD (coronary artery disease); Cancer ([address] Chronic kidney disease; Chronic pain; COPD; Diabetes ([address] Difficult intubation; Heart failure ([address] Liver disease; Malignant hyperthermia due to anesthesia; Nausea & vomiting; Other ill-defined conditions; Pseudocholinesterase deficiency; PUD (peptic ulcer disease); Seizures ([address] Stroke [facility]); Thromboembolus ([address]  Thyroid disease; or Unspecified sleep apnea. Ms. Ryan Tomlinson  has a past surgical history that includes orthopaedic (Right); colonoscopy (10/12/2016); tonsillectomy (age 27); total abdominal hysterectomy (20 yrs ago); and lipoma resection (Left). Knee replacement 9 years. 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 Current Medications:    Current Outpatient Prescriptions:     estradiol (ESTRACE) 0.01 % (0.1 mg/gram) vaginal cream, Insert 2 g into vagina daily. , Disp: , Rfl:     ALPRAZolam (XANAX) 0.5 mg tablet, Take 1 Tab by mouth every twelve (12) hours as needed for Anxiety. Max Daily Amount: 1 mg., Disp: 60 Tab, Rfl: 5    levocetirizine (XYZAL) 5 mg tablet, Take  by mouth., Disp: , Rfl:     therapeutic multivitamin (THERAGRAN) tablet, Take 1 Tab by mouth daily. , Disp: , Rfl:    Date Last Reviewed:  11/28/2017  Gynecological History:   · Number of pregnancies: 1, vaginal 1  · Episiotomy: grade 2  Past Urinary Medical History:    · History of UTI, Menopause: Gynecologist stated that ovaries are dried up   · Previous Treatments: using Estrase   Incontinence History:  Feels pressure and has been using Estrase and it is helpful. Voiding Patterns:  Patient voids every 2-3 hours during the day and 2-3 times during the night. Patient reports that she empties bladder. Fluid Intake:  Patient drinks 6 oz fluid per day. \  Patient not limit fluid intake to control bladder. Bowel Habits:  Patient demonstrates normal bowel habits. Mobility / Self Care: I  Personal / Social History:  Sexually active?  Not currently     Number of Personal Factors/Comorbidities that affect the Plan of Care: 1-2: MODERATE COMPLEXITY   EXAMINATION:   External Observation:   · Voluntary Contraction: present  · Voluntary Relaxation: present  · Involuntary Contraction: not present  · Involuntary Relaxation: not tested  · Perineal Body Assessment: slight decent  · Anal [place]: absent B  · Skin Integrity: vaginal atrophy  · Q-tip Test: no pain noted   · Vaginal Vault Size: slight increase    [place] PERFECT (Power/Endurnace/Repetitions/Fast Twitch/Elevation/Co-contraction/Timing) Scale:   · [place] PERFECT = 2/4/10/8/P/P/P  · Tissue support test with bearing down:  Grade 1: not visible at introitus; Grade 2: visible at introitus; Grade 3: tissue outside introitus  · Anterior Wall = 1   · Posterior Wall = 0   · Vaginal vault = 1   Lumbar Assessment (Strength):  Core strength: Grossly 3/5 with manual muscle testing    Palpation:  Poor coordination with pelvic floor contraction   Body Structures Involved:  1. Muscles Body Functions Affected:  1. Genitourinary  2. Neuromusculoskeletal Activities and Participation Affected:  1. Self Care  2. Interpersonal Interactions and Relationships  3. Community, Social and [place]   Number of elements that affect the Plan of Care: 3: MODERATE COMPLEXITY   CLINICAL PRESENTATION:   Presentation: Stable and uncomplicated: LOW COMPLEXITY   CLINICAL DECISION MAKING:   Outcome Measure: Tool Used: Pelvic Floor Impact Questionnaire--short form 7 (PFIQ-7)   Score:  Initial:   · Bladder or Urine: 0  · Bowel or Rectum: 0  · Vagina or Pelvis: 6 Most Recent: X (Date: -- )  · Bladder or Urine: X  · Bowel or Rectum: X  · Vagina or Pelvis: X   Interpretation of Score: Each of the 7 sections is scored on a scale from 0-3; 0 representing \"Not at all\", 3 representing \"Quite a bit\". The mean value is taken from all the answered items, then multiplied by 100 to obtain the scale score, ranging from 0-100. This process is repeated for each column representing bowel, bladder, and pelvic pain. ?  Self Care:     - CURRENT STATUS: CJ - 20%-39% impaired, limited or restricted    - GOAL STATUS: CI - 1%-19% impaired, limited or restricted    - D/C STATUS:  ---------------To be determined---------------     Medical Necessity:   · Patient is expected to demonstrate progress in strength and coordination to decrease assistance required with Kegel. · Patient demonstrates good rehab potential due to higher previous functional level. Reason for Services/Other Comments:  · Patient continues to require skilled intervention due to pelvic pressure. Use of outcome tool(s) and clinical judgement create a POC that gives a: Clear prediction of patient's progress: LOW COMPLEXITY   TREATMENT:   (In addition to Assessment/Re-Assessment sessions the following treatments were rendered)  Pre-treatment Symptoms/Complaints: has had more good days than bad. Has been getting up and doing her exercises. Pain: Initial: Pain Intensity 1: 0  Post Session:  0     THERAPEUTIC EXERCISE: (55 minutes):  Exercises per grid below to improve strength and coordination. Required minimal verbal and tactile cues to promote proper body breathing techniques. Progressed complexity of movement as indicated.     Exercises:  Patient instructed in pelvic floor exercises listed below:   Date:  10/31/2017 Date:  11/14/2017 Date:  11/28/2017   Activity/Exercise Parameters     Pt education  15 min     Kegel in supine with tactile feedback    5 sec on 10 sec off x 10 x2  2 sec on 2 off x 10 5 sec on 10 sec off x 10 x2  2 sec on 2 off x 10  Elevator exercises x 10 5 sec on 10 sec off x 10 x2  2 sec on 2 off x 10  Elevator exercises x 10   Kegel in supine with biofeedback assist for visual feedback via internal vaginal sensors   5 sec on 10 sec off x 10 x2  2 sec on 2 off x 10 Elevator x 10 x2  2 sec on 2 off x 10 Elevator x 10 x2  2 sec on 2 off x 10   NMES with Kegel 50 Hz 5 on 10 off  5 min  50 Hz 5 on 10 off  8 min  50 Hz 5 on 10 off  10 min    Prone swan X 10  DC due to lumbar pain     Prone alt hip extension X 10 X 10     Bridges X 10  X 10     Single knee to chest X 10 sec x 3 X 10    TA sets   X 8 x8   TA sets with marches  X 4 x 8 X 4 x 8   Standing extension    X 10 B   Standing abduction    X 10    Roll in and roll out   X 10 B   Sitting on swiss ball doing Kegel   X 10                    The following educational topics were reviewed with patient:    Treatment/Session Assessment:    · Response to Treatment:  Pt making good progress. Will progress to malignance program next visit. · Compliance with Program/Exercises: Will assess as treatment progresses. · Recommendations/Intent for next treatment session: \"Next visit will focus on advancements to more challenging activities\".   Total Treatment Duration:  PT Patient Time In/Time Out  Time In: 2152  Time Out: 044 [facility],

## 2017-12-07 ENCOUNTER — HOSPITAL ENCOUNTER (OUTPATIENT)
Dept: PHYSICAL THERAPY | Age: 72
Discharge: HOME | End: 2017-12-07
Attending: OBSTETRICS & GYNECOLOGY
Payer: MEDICARE

## 2017-12-07 PROCEDURE — 97110 THERAPEUTIC EXERCISES: CPT

## 2017-12-07 NOTE — PROGRESS NOTES
Mook Fairbanks  : 1945 [facility] at [facility]  [address], [address] [address],[address], [facility] [address]  [phone]   [phone]          OUTPATIENT PHYSICAL THERAPY:Daily Note and Progress Report 2017    ICD-10: Treatment Diagnosis:  Muscle weakness (generalized) (M62.)  Precautions/Allergies:   Latex and Nickel   Fall Risk Score: 0 (? 5 = High Risk)  MD Orders: Eval and treat MEDICAL/REFERRING DIAGNOSIS:  Postmenopausal atrophic vaginitis [N95.2]   DATE OF ONSET: 6 months  REFERRING PHYSICIAN: Chelsy Moy MD  RETURN PHYSICIAN APPOINTMENT: 2017     INITIAL ASSESSMENT:  Ms. Bob Watters is a 73year old female who has been seen for 5 physical therapy visits. She has been doing excellent with PT. She now is able to perform most ADL's without feelings of prolapse. She still has occasional difficulty with bending, stooping, or vacuuming. Her strenght of the pelvic floor has increased from 2/5 to 3/5 and her endurance continues to improve as well. Overall feels about 70% better. She has met all of her short term goals and progressing towards her long term goals. Pt is very complaint with her HEP. I anticipate one to two more PT visits for to improve strength and endurance of the pelvic floor. I will also then provide her with instructions for a  maintenance program and upgrade of HEP. PROBLEM LIST (Impacting functional limitations):  1. Decreased Strength  2. Decreased ADL/Functional Activities  3. Decreased [place] with Home Exercise Program   4. Decreased strength of pelvic floor which contributes to pelvic pressure INTERVENTIONS PLANNED:  1. Neuromuscular re-education  2. Biofeedback  3. HEP  4. Home Exercise Program (HEP)  5. Neuromuscular Re-education/Strengthening  6. Range of Motion (ROM)  7. Therapeutic Activites  8. Therapeutic Exercise/Strengthening   TREATMENT PLAN:  Effective Dates: 10/2/2017 TO 2017.   Frequency/Duration: 1 time a week for 12 weeks  GOALS: (Goals have been discussed and agreed upon with patient.)  Short-Term Functional Goals: Time Frame: in 3 weeks  1. Patient will demonstrate independence with home exercise program.(Met)  2. Patient will demonstrated good coordination of pelvic floor muscles to improve Kegel.(met)  Discharge Goals: Time Frame: in 12 weeks  1. Pt will demonstrate an increase in PFM endurance from 4 hold to 10 hold x 10 reps as measured by EMG within 6 weeks in order to improve PFM activity and thus decrease pelvic pressure(progressing well)  2. Patient will be able to perform basic ADL without pelvic pressure(progressing well)  3. Patient will increase pelvic floor strength from 2/5 to 4/5. Rehabilitation Potential For Stated Goals: Excellent  Regarding Marcelo Purcellthomas Magdi's therapy, I certify that the treatment plan above will be carried out by a therapist or under their direction. Thank you for this referral,  Clarisse Schroeder PT     Referring Physician Signature: Ria Piña MD              Date                    The information in this section was collected on 10/3/2017 (except where otherwise noted). HISTORY:   Present Symptoms:  Pain Intensity 1: 0 Pelvic pressure. The more she is up and moving will feel pressure. Increased pressure with walking, lifting, and standing. Unable to vacuum or sweep for any period of time without pressure. Rides stationary bike 6 miles a day. History of Present Injury/Illness (Reason for Referral): Has been having pelvic pressure. Has been told that her vagina is dropping. Has had her mom for the past 5 years. Past Medical History/Comorbidities:   Ms. Gamboa  has a past medical history of Arthritis; Depressive disorder, not elsewhere classified (8/20/2015); GERD (gastroesophageal reflux disease); Insomnia, unspecified (8/20/2015); Unspecified adverse effect of anesthesia; and Unspecified essential hypertension (8/20/2015).  She also has no past medical history of Arrhythmia; Asthma; Autoimmune disease ([facility] [address] CAD (coronary artery disease); Cancer ([facility] [address] Chronic kidney disease; Chronic pain; COPD; Diabetes ([facility] [address] Difficult intubation; Heart failure ([facility] [address] Liver disease; Malignant hyperthermia due to anesthesia; Nausea & vomiting; Other ill-defined conditions; Pseudocholinesterase deficiency; PUD (peptic ulcer disease); Seizures ([facility] [address] Stroke [facility]); Thromboembolus ([facility] [address] Thyroid disease; or Unspecified sleep apnea. Ms. Ryan Tomlinson  has a past surgical history that includes orthopaedic (Right); colonoscopy (10/12/2016); tonsillectomy (age 27); total abdominal hysterectomy (20 yrs ago); and lipoma resection (Left). Knee replacement 9 years. 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 Current Medications:    Current Outpatient Prescriptions:     estradiol (ESTRACE) 0.01 % (0.1 mg/gram) vaginal cream, Insert 2 g into vagina daily. , Disp: , Rfl:     ALPRAZolam (XANAX) 0.5 mg tablet, Take 1 Tab by mouth every twelve (12) hours as needed for Anxiety. Max Daily Amount: 1 mg., Disp: 60 Tab, Rfl: 5    levocetirizine (XYZAL) 5 mg tablet, Take  by mouth., Disp: , Rfl:     therapeutic multivitamin (THERAGRAN) tablet, Take 1 Tab by mouth daily. , Disp: , Rfl:    Date Last Reviewed:  12/7/2017  Gynecological History:   · Number of pregnancies: 1, vaginal 1  · Episiotomy: grade 2  Past Urinary Medical History:    · History of UTI, Menopause: Gynecologist stated that ovaries are dried up   · Previous Treatments: using Estrase   Incontinence History:  Feels pressure and has been using Estrase and it is helpful. Voiding Patterns:  Patient voids every 2-3 hours during the day and 2-3 times during the night. Patient reports that she empties bladder. Fluid Intake:  Patient drinks 6 oz fluid per day. \  Patient not limit fluid intake to control bladder.   Bowel Habits:  Patient demonstrates normal bowel habits. Mobility / Self Care: I  Personal / Social History:  Sexually active? Not currently     Number of Personal Factors/Comorbidities that affect the Plan of Care: 1-2: MODERATE COMPLEXITY   EXAMINATION:   External Observation:   · Voluntary Contraction: present  · Voluntary Relaxation: present  · Involuntary Contraction: not present  · Involuntary Relaxation: not tested  · Perineal Body Assessment: slight decent  · Anal [place]: absent B  · Skin Integrity: vaginal atrophy  · Q-tip Test: no pain noted   · Vaginal Vault Size: slight increase    [place] PERFECT (Power/Endurnace/Repetitions/Fast Twitch/Elevation/Co-contraction/Timing) Scale:   · [place] PERFECT = 2/4/10/8/P/P/P  · PERFECT 12/7/2017=   3/5/10/14/P/P/P    · Tissue support test with bearing down:  Grade 1: not visible at introitus; Grade 2: visible at introitus; Grade 3: tissue outside introitus  · Anterior Wall = 1   · Posterior Wall = 0   · Vaginal vault = 1   Lumbar Assessment (Strength):  Core strength: Grossly 3/5 with manual muscle testing    Palpation:  Poor coordination with pelvic floor contraction  SEMG evaluation:   Date: 12/7/2017   Resting Tone: 2   Quality of Resting Tone: good   5 Second Hold: 5-6 uV   10 Second Hold: 5 uV   Quality of Recruitment: Good    Quality of Relaxation: Good    Quality of Holding: Good    Stability of Hold: Good    Stability of Rest: Good      Body Structures Involved:  1. Muscles Body Functions Affected:  1. Genitourinary  2. Neuromusculoskeletal Activities and Participation Affected:  1. Self Care  2. Interpersonal Interactions and Relationships  3. Community, Social and [place]   Number of elements that affect the Plan of Care: 3: MODERATE COMPLEXITY   CLINICAL PRESENTATION:   Presentation: Stable and uncomplicated: LOW COMPLEXITY   CLINICAL DECISION MAKING:   Outcome Measure:    Tool Used: Pelvic Floor Impact Questionnaire--short form 7 (PFIQ-7)   Score:  Initial:   · Bladder or Urine: 0  · Bowel or Rectum: 0  · Vagina or Pelvis: 6 Most Recent: X (Date:  )  · Bladder or Urine: X  · Bowel or Rectum: X  · Vagina or Pelvis: X   Interpretation of Score: Each of the 7 sections is scored on a scale from 0-3; 0 representing \"Not at all\", 3 representing \"Quite a bit\". The mean value is taken from all the answered items, then multiplied by 100 to obtain the scale score, ranging from 0-100. This process is repeated for each column representing bowel, bladder, and pelvic pain. ? Self Care:     - CURRENT STATUS: CJ - 20%-39% impaired, limited or restricted    - GOAL STATUS: CI - 1%-19% impaired, limited or restricted    - D/C STATUS:  ---------------To be determined---------------     Medical Necessity:   · Patient is expected to demonstrate progress in strength and coordination to decrease assistance required with Kegel. · Patient demonstrates good rehab potential due to higher previous functional level. Reason for Services/Other Comments:  · Patient continues to require skilled intervention due to pelvic pressure. Use of outcome tool(s) and clinical judgement create a POC that gives a: Clear prediction of patient's progress: LOW COMPLEXITY   TREATMENT:   (In addition to Assessment/Re-Assessment sessions the following treatments were rendered)  Pre-treatment Symptoms/Complaints: has only had a couple bad days. Has been doing really good. Bending, stooping and occasional vacuuming do cause some increase in prolapse, but it is much better. Pain: Initial: Pain Intensity 1: 0  Post Session:  0     THERAPEUTIC EXERCISE: (53 minutes):  Exercises per grid below to improve strength and coordination. Required minimal verbal and tactile cues to promote proper body breathing techniques. Progressed complexity of movement as indicated.     Exercises:  Patient instructed in pelvic floor exercises listed below:   Date:  11/28/2017 Date:  12/7/2017   Activity/Exercise     Pt education      Kegel in supine with tactile feedback    5 sec on 10 sec off x 10 x2  2 sec on 2 off x 10  Elevator exercises x 10 5 sec on 10 sec off x 10 x2  2 sec on 2 off x 10  Elevator exercises x 10   Kegel in supine with biofeedback assist for visual feedback via internal vaginal sensors   Elevator x 10 x2  2 sec on 2 off x 10 Elevator x 10 x2  2 sec on 2 off x 10   NMES with Kegel 50 Hz 5 on 10 off  10 min  50 Hz 5 on 10 off  10 min    Prone swan     Prone alt hip extension     Bridges     Single knee to chest     TA sets  x8    TA sets with marches X 4 x 8 x4 x 8   Standing extension  X 10 B X 10 B   Standing abduction  X 10  X 10 B   Roll in and roll out X 10 B X 10 B on swiss ball   Sitting on swiss ball doing Kegel X 10 X 10 B                  The following educational topics were reviewed with patient:    Treatment/Session Assessment:    · Response to Treatment: has made excellent gains with biofeedback. Will anticipate one to two more visit for HEP and maintenance program.   · Compliance with Program/Exercises: Will assess as treatment progresses. · Recommendations/Intent for next treatment session: \"Next visit will focus on advancements to more challenging activities\".   Total Treatment Duration:  PT Patient Time In/Time Out  Time In: 0725  Time Out: [address], PT

## 2017-12-08 ENCOUNTER — PATIENT OUTREACH (OUTPATIENT)
Dept: CASE MANAGEMENT | Age: 72
End: 2017-12-08

## 2017-12-08 NOTE — PROGRESS NOTES
High risk chart review. No immediate CCM needs identified. This note will not be viewable in [address]

## 2018-01-04 ENCOUNTER — HOSPITAL ENCOUNTER (OUTPATIENT)
Dept: PHYSICAL THERAPY | Age: 73
Discharge: HOME | End: 2018-01-04
Attending: OBSTETRICS & GYNECOLOGY
Payer: MEDICARE

## 2018-01-04 PROCEDURE — 97110 THERAPEUTIC EXERCISES: CPT

## 2018-01-04 PROCEDURE — G8989 SELF CARE D/C STATUS: HCPCS

## 2018-01-04 PROCEDURE — G8988 SELF CARE GOAL STATUS: HCPCS

## 2018-01-04 NOTE — THERAPY RECERTIFICATION
Jayjay Fairbanks  : 1945 Therapy Center at [facility]  [address], [address] [address],[address], [address] [facility]  [phone]   [phone]          OUTPATIENT PHYSICAL THERAPY:Daily Note and Discharge 2018    ICD-10: Treatment Diagnosis:  Muscle weakness (generalized) (M62.)  Precautions/Allergies:   Latex and Nickel   Fall Risk Score: 0 (? 5 = High Risk)  MD Orders: Eval and treat MEDICAL/REFERRING DIAGNOSIS:  Postmenopausal atrophic vaginitis [N95.2]   DATE OF ONSET: 6 months  REFERRING PHYSICIAN: Ana Maria Cullen MD  RETURN PHYSICIAN APPOINTMENT: 2017     INITIAL ASSESSMENT:  Ms. Lui Amador is a 73year old female who has been seen for 6 physical therapy visits. She still has occasional difficulty with bending, stooping, or vacuuming. Her strenght of the pelvic floor has increased from 2/5 to 3/5 and her endurance continues to improve as well. She has met all of her short term goals and 2/3 of her long term goals. .  Pt is very complaint with her HEP. She is no appropriate for D/C with HEP. PROBLEM LIST (Impacting functional limitations):  1. Decreased Strength  2. Decreased ADL/Functional Activities  3. Decreased [place] with Home Exercise Program   4. Decreased strength of pelvic floor which contributes to pelvic pressure INTERVENTIONS PLANNED:  1. Neuromuscular re-education  2. Biofeedback  3. HEP  4. Home Exercise Program (HEP)  5. Neuromuscular Re-education/Strengthening  6. Range of Motion (ROM)  7. Therapeutic Activites  8. Therapeutic Exercise/Strengthening   TREATMENT PLAN:  Effective Dates:2017. Frequency/Duration: 1 visit  GOALS: (Goals have been discussed and agreed upon with patient.)  Short-Term Functional Goals: Time Frame: in 3 weeks  1. Patient will demonstrate independence with home exercise program.(Met)  2.  Patient will demonstrated good coordination of pelvic floor muscles to improve Kegel.(met)  Discharge Goals: Time Frame: in 12 weeks  1. Pt will demonstrate an increase in PFM endurance from 4 hold to 10 hold x 10 reps as measured by EMG within 6 weeks in order to improve PFM activity and thus decrease pelvic pressure(met1/4/2018)  2. Patient will be able to perform basic ADL without pelvic pressure(Net 1/4/2018)  3. Patient will increase pelvic floor strength from 2/5 to 4/5. (will work towards on own)  Rehabilitation Potential For Stated Goals: Excellent  Regarding Archana Fairbanks's therapy, I certify that the treatment plan above will be carried out by a therapist or under their direction. Thank you for this referral,  Ladonna Bruner, PT     Referring Physician Signature: Abner Spencer MD              Date                    The information in this section was collected on 10/3/2017 (except where otherwise noted). HISTORY:   Present Symptoms:  Pain Intensity 1: 0 Pelvic pressure. The more she is up and moving will feel pressure. Increased pressure with walking, lifting, and standing. Unable to vacuum or sweep for any period of time without pressure. Rides stationary bike 6 miles a day. History of Present Injury/Illness (Reason for Referral): Has been having pelvic pressure. Has been told that her vagina is dropping. Has had her mom for the past 5 years. Past Medical History/Comorbidities:   Ms. Ty Molina  has a past medical history of Arthritis; Depressive disorder, not elsewhere classified (8/20/2015); GERD (gastroesophageal reflux disease); Insomnia, unspecified (8/20/2015); Unspecified adverse effect of anesthesia; and Unspecified essential hypertension (8/20/2015). She also has no past medical history of Arrhythmia; Asthma; Autoimmune disease ([address] CAD (coronary artery disease); Cancer ([address] Chronic kidney disease; Chronic pain; COPD; Diabetes ([address] Difficult intubation; Heart failure ([address] Liver disease; Malignant hyperthermia due to anesthesia; Nausea & vomiting;  Other ill-defined conditions(799[address]89); Pseudocholinesterase deficiency; PUD (peptic ulcer disease); Seizures ([facility] [address] Stroke [facility]); Thromboembolus ([facility] [address] Thyroid disease; or Unspecified sleep apnea. Ms. Linda Zhou  has a past surgical history that includes hx orthopaedic (Right); hx colonoscopy (10/12/2016); hx tonsillectomy (age 27); hx total abdominal hysterectomy (20 yrs ago); and hx lipoma resection (Left). Knee replacement 9 years. 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 Current Medications:    Current Outpatient Prescriptions:     estradiol (ESTRACE) 0.01 % (0.1 mg/gram) vaginal cream, Insert 2 g into vagina daily. , Disp: , Rfl:     ALPRAZolam (XANAX) 0.5 mg tablet, Take 1 Tab by mouth every twelve (12) hours as needed for Anxiety. Max Daily Amount: 1 mg., Disp: 60 Tab, Rfl: 5    levocetirizine (XYZAL) 5 mg tablet, Take  by mouth., Disp: , Rfl:     therapeutic multivitamin (THERAGRAN) tablet, Take 1 Tab by mouth daily. , Disp: , Rfl:    Date Last Reviewed:  1/4/2018  Gynecological History:   · Number of pregnancies: 1, vaginal 1  · Episiotomy: grade 2  Past Urinary Medical History:    · History of UTI, Menopause: Gynecologist stated that ovaries are dried up   · Previous Treatments: using Estrase   Incontinence History:  Feels pressure and has been using Estrase and it is helpful. Voiding Patterns:  Patient voids every 2-3 hours during the day and 2-3 times during the night. Patient reports that she empties bladder. Fluid Intake:  Patient drinks 6 oz fluid per day. \  Patient not limit fluid intake to control bladder. Bowel Habits:  Patient demonstrates normal bowel habits. Mobility / Self Care: I  Personal / Social History:  Sexually active?  Not currently     Number of Personal Factors/Comorbidities that affect the Plan of Care: 1-2: MODERATE COMPLEXITY   EXAMINATION:   External Observation:   · Voluntary Contraction: present  · Voluntary Relaxation: present  · Involuntary Contraction: not present  · Involuntary Relaxation: not tested  · Perineal Body Assessment: slight decent  · Anal [place]: absent B  · Skin Integrity: vaginal atrophy  · Q-tip Test: no pain noted   · Vaginal Vault Size: slight increase    [place] PERFECT (Power/Endurnace/Repetitions/Fast Twitch/Elevation/Co-contraction/Timing) Scale:   · [place] PERFECT = 2/4/10/8/P/P/P  · PERFECT 12/7/2017=   3/5/10/14/P/P/P    · PERFECT 1/4/2018 = 3/10/10/22/P/P/P  · Tissue support test with bearing down:  Grade 1: not visible at introitus; Grade 2: visible at introitus; Grade 3: tissue outside introitus  · Anterior Wall = 1   · Posterior Wall = 0   · Vaginal vault = 1   Lumbar Assessment (Strength):  Core strength: Grossly 3/5 with manual muscle testing    Palpation:  Poor coordination with pelvic floor contraction  SEMG evaluation:   Date: 1/4/2018   Resting Tone: 2   Quality of Resting Tone: good   5 Second Hold: 6 uV   10 Second Hold: 2 uV   Quality of Recruitment: Good    Quality of Relaxation: Good    Quality of Holding: Good    Stability of Hold: Good    Stability of Rest: Good      Body Structures Involved:  1. Muscles Body Functions Affected:  1. Genitourinary  2. Neuromusculoskeletal Activities and Participation Affected:  1. Self Care  2. Interpersonal Interactions and Relationships  3. Community, Social and [place]   Number of elements that affect the Plan of Care: 3: MODERATE COMPLEXITY   CLINICAL PRESENTATION:   Presentation: Stable and uncomplicated: LOW COMPLEXITY   CLINICAL DECISION MAKING:   Outcome Measure:    Tool Used: Pelvic Floor Impact Questionnaire--short form 7 (PFIQ-7)   Score:  Initial:   · Bladder or Urine: 0  · Bowel or Rectum: 0  · Vagina or Pelvis: 6 Most Recent: 1/4/2018 (Date:  )  · Bladder or Urine: 0  · Bowel or Rectum: 0  · Vagina or Pelvis: 2   Interpretation of Score: Each of the 7 sections is scored on a scale from 0-3; 0 representing \"Not at all\", 3 representing \"Quite a bit\". The mean value is taken from all the answered items, then multiplied by 100 to obtain the scale score, ranging from 0-100. This process is repeated for each column representing bowel, bladder, and pelvic pain. ? Self Care:     - CURRENT STATUS: CI - 1%-19% impaired, limited or restricted    - GOAL STATUS: CI - 1%-19% impaired, limited or restricted    - D/C STATUS:  ---------------To be determined---------------     Medical Necessity:   · Patient is expected to demonstrate progress in strength and coordination to decrease assistance required with Kegel. · Patient demonstrates good rehab potential due to higher previous functional level. Reason for Services/Other Comments:  · Patient continues to require skilled intervention due to pelvic pressure. Use of outcome tool(s) and clinical judgement create a POC that gives a: Clear prediction of patient's progress: LOW COMPLEXITY   TREATMENT:   (In addition to Assessment/Re-Assessment sessions the following treatments were rendered)  Pre-treatment Symptoms/Complaints: Pt reported seeing Dr. Erika Bright and she thought the prolapse looked great. Feels much less pressure. Overall, doing well. Pain: Initial: Pain Intensity 1: 0  Post Session:  0     THERAPEUTIC EXERCISE: (38 minutes):  Exercises per grid below to improve strength and coordination. Required minimal verbal and tactile cues to promote proper body breathing techniques. Progressed complexity of movement as indicated.     Exercises:  Patient instructed in pelvic floor exercises listed below:   Date:  11/28/2017 Date:  12/7/2017 Date:  1/4/2018   Activity/Exercise      Pt education    10 min Maintenance program   Kegel in supine with tactile feedback    5 sec on 10 sec off x 10 x2  2 sec on 2 off x 10  Elevator exercises x 10 5 sec on 10 sec off x 10 x2  2 sec on 2 off x 10  Elevator exercises x 10 5 sec on 10 sec off x 10 x2  2 sec on 2 off x 10  Elevator exercises x 10   Kegel in supine with biofeedback assist for visual feedback via internal vaginal sensors   Elevator x 10 x2  2 sec on 2 off x 10 Elevator x 10 x2  2 sec on 2 off x 10 Elevator x 10 x2  2 sec on 2 off x 10   NMES with Kegel 50 Hz 5 on 10 off  10 min  50 Hz 5 on 10 off  10 min  512Hz 5 on 10 off  10 min    Prone swan      Prone alt hip extension      Bridges      Single knee to chest      TA sets  x8     TA sets with marches X 4 x 8 x4 x 8    Standing extension  X 10 B X 10 B    Standing abduction  X 10  X 10 B    Roll in and roll out X 10 B X 10 B on swiss ball    Sitting on swiss ball doing Kegel X 10 X 10 B                     The following educational topics were reviewed with patient:    Treatment/Session Assessment:    · Response to Treatment: Pt did excellent with PT. Appropriate for discharge   · Compliance with Program/Exercises: Will assess as treatment progresses. · Recommendations/Intent for next treatment session: \"Next visit will focus on advancements to more challenging activities\".   Total Treatment Duration:  PT Patient Time In/Time Out  Time In: 1000  Time Out: [address], PT

## 2018-05-30 ENCOUNTER — APPOINTMENT (RX ONLY)
Dept: URBAN - METROPOLITAN AREA CLINIC 23 | Facility: CLINIC | Age: 73
Setting detail: DERMATOLOGY
End: 2018-05-30

## 2018-05-30 DIAGNOSIS — Z85.828 PERSONAL HISTORY OF OTHER MALIGNANT NEOPLASM OF SKIN: ICD-10-CM

## 2018-05-30 DIAGNOSIS — D485 NEOPLASM OF UNCERTAIN BEHAVIOR OF SKIN: ICD-10-CM

## 2018-05-30 DIAGNOSIS — D22 MELANOCYTIC NEVI: ICD-10-CM

## 2018-05-30 DIAGNOSIS — L82.1 OTHER SEBORRHEIC KERATOSIS: ICD-10-CM

## 2018-05-30 PROBLEM — F41.9 ANXIETY DISORDER, UNSPECIFIED: Status: ACTIVE | Noted: 2018-05-30

## 2018-05-30 PROBLEM — D22.5 MELANOCYTIC NEVI OF TRUNK: Status: ACTIVE | Noted: 2018-05-30

## 2018-05-30 PROBLEM — D48.5 NEOPLASM OF UNCERTAIN BEHAVIOR OF SKIN: Status: ACTIVE | Noted: 2018-05-30

## 2018-05-30 PROCEDURE — ? COUNSELING

## 2018-05-30 PROCEDURE — 99214 OFFICE O/P EST MOD 30 MIN: CPT

## 2018-05-30 PROCEDURE — ? DEFER

## 2018-05-30 ASSESSMENT — LOCATION ZONE DERM
LOCATION ZONE: FACE
LOCATION ZONE: TRUNK
LOCATION ZONE: NECK
LOCATION ZONE: LEG

## 2018-05-30 ASSESSMENT — LOCATION DETAILED DESCRIPTION DERM
LOCATION DETAILED: LEFT DISTAL PRETIBIAL REGION
LOCATION DETAILED: RIGHT INFERIOR LATERAL NECK
LOCATION DETAILED: LEFT LATERAL TEMPLE
LOCATION DETAILED: RIGHT SUPERIOR MEDIAL UPPER BACK
LOCATION DETAILED: LEFT LATERAL EYEBROW
LOCATION DETAILED: EPIGASTRIC SKIN
LOCATION DETAILED: INFERIOR THORACIC SPINE
LOCATION DETAILED: RIGHT CENTRAL TEMPLE

## 2018-05-30 ASSESSMENT — LOCATION SIMPLE DESCRIPTION DERM
LOCATION SIMPLE: UPPER BACK
LOCATION SIMPLE: RIGHT UPPER BACK
LOCATION SIMPLE: LEFT EYEBROW
LOCATION SIMPLE: LEFT TEMPLE
LOCATION SIMPLE: LEFT PRETIBIAL REGION
LOCATION SIMPLE: ABDOMEN
LOCATION SIMPLE: RIGHT ANTERIOR NECK
LOCATION SIMPLE: RIGHT TEMPLE

## 2018-06-25 ENCOUNTER — APPOINTMENT (RX ONLY)
Dept: URBAN - METROPOLITAN AREA CLINIC 23 | Facility: CLINIC | Age: 73
Setting detail: DERMATOLOGY
End: 2018-06-25

## 2018-06-25 DIAGNOSIS — L82.1 OTHER SEBORRHEIC KERATOSIS: ICD-10-CM

## 2018-06-25 DIAGNOSIS — D49.2 NEOPLASM OF UNSPECIFIED BEHAVIOR OF BONE, SOFT TISSUE, AND SKIN: ICD-10-CM

## 2018-06-25 PROBLEM — D48.5 NEOPLASM OF UNCERTAIN BEHAVIOR OF SKIN: Status: ACTIVE | Noted: 2018-06-25

## 2018-06-25 PROCEDURE — 11100: CPT

## 2018-06-25 PROCEDURE — ? OTHER

## 2018-06-25 PROCEDURE — 99212 OFFICE O/P EST SF 10 MIN: CPT | Mod: 25

## 2018-06-25 PROCEDURE — ? BIOPSY BY SHAVE METHOD

## 2018-06-25 PROCEDURE — ? COUNSELING

## 2018-06-25 ASSESSMENT — LOCATION SIMPLE DESCRIPTION DERM
LOCATION SIMPLE: LEFT TEMPLE
LOCATION SIMPLE: LEFT ZYGOMA
LOCATION SIMPLE: NOSE
LOCATION SIMPLE: RIGHT TEMPLE

## 2018-06-25 ASSESSMENT — LOCATION ZONE DERM
LOCATION ZONE: NOSE
LOCATION ZONE: FACE

## 2018-06-25 ASSESSMENT — LOCATION DETAILED DESCRIPTION DERM
LOCATION DETAILED: RIGHT LATERAL TEMPLE
LOCATION DETAILED: LEFT MEDIAL TEMPLE
LOCATION DETAILED: NASAL DORSUM
LOCATION DETAILED: LEFT CENTRAL ZYGOMA

## 2018-06-25 NOTE — PROCEDURE: BIOPSY BY SHAVE METHOD
Bill For Surgical Tray: no
Cryotherapy Text: The wound bed was treated with cryotherapy after the biopsy was performed.
Anesthesia Type: 1% lidocaine with 1:200,000 epinephrine and a 1:10 solution of 8.4% sodium bicarbonate
Biopsy Method: Dermablade
Type Of Destruction Used: Curettage
Biopsy Type: H and E
Was A Bandage Applied: Yes
Detail Level: Detailed
Consent: Written consent was obtained and risks were reviewed including but not limited to scarring, infection, bleeding, scabbing, incomplete removal, nerve damage and allergy to anesthesia.
Post-Care Instructions: I reviewed with the patient in detail post-care instructions. Patient is to keep the biopsy site dry overnight, and then apply bacitracin twice daily until healed. Patient may apply hydrogen peroxide soaks to remove any crusting.
Outside Pathology Billing: outside pathology read only
Dressing: pressure dressing with telfa
Wound Care: Vaseline
Electrodesiccation And Curettage Text: The wound bed was treated with electrodesiccation and curettage after the biopsy was performed.
Notification Instructions: Patient will be notified of biopsy results. However, patient instructed to call the office if not contacted within 2 weeks.
Electrodesiccation Text: The wound bed was treated with electrodesiccation after the biopsy was performed.
Size Of Lesion In Cm: 0
Hemostasis: Electrocautery
Billing Type: Third-Party Bill
Silver Nitrate Text: The wound bed was treated with silver nitrate after the biopsy was performed.
Depth Of Biopsy: dermis
Anesthesia Volume In Cc: 0.5

## 2018-07-18 ENCOUNTER — APPOINTMENT (RX ONLY)
Dept: URBAN - METROPOLITAN AREA CLINIC 23 | Facility: CLINIC | Age: 73
Setting detail: DERMATOLOGY
End: 2018-07-18

## 2018-07-18 DIAGNOSIS — L50.1 IDIOPATHIC URTICARIA: ICD-10-CM

## 2018-07-18 DIAGNOSIS — K13.0 DISEASES OF LIPS: ICD-10-CM

## 2018-07-18 DIAGNOSIS — Z85.828 PERSONAL HISTORY OF OTHER MALIGNANT NEOPLASM OF SKIN: ICD-10-CM

## 2018-07-18 PROBLEM — L55.1 SUNBURN OF SECOND DEGREE: Status: ACTIVE | Noted: 2018-07-18

## 2018-07-18 PROCEDURE — 99213 OFFICE O/P EST LOW 20 MIN: CPT

## 2018-07-18 PROCEDURE — ? TREATMENT REGIMEN

## 2018-07-18 PROCEDURE — ? COUNSELING

## 2018-07-18 PROCEDURE — ? PRESCRIPTION

## 2018-07-18 RX ORDER — FLUCONAZOLE 150 MG/1
TABLET ORAL
Qty: 4 | Refills: 0 | Status: ERX

## 2018-07-18 RX ORDER — TRIAMCINOLONE ACETONIDE 0.1 %
OINTMENT (GRAM) TOPICAL
Qty: 1 | Refills: 0 | Status: ERX

## 2018-07-18 ASSESSMENT — LOCATION DETAILED DESCRIPTION DERM
LOCATION DETAILED: LEFT LATERAL TEMPLE
LOCATION DETAILED: LEFT PROXIMAL DORSAL FOREARM
LOCATION DETAILED: LEFT DISTAL PRETIBIAL REGION
LOCATION DETAILED: RIGHT LOWER CUTANEOUS LIP

## 2018-07-18 ASSESSMENT — LOCATION ZONE DERM
LOCATION ZONE: ARM
LOCATION ZONE: FACE
LOCATION ZONE: LEG
LOCATION ZONE: LIP

## 2018-07-18 ASSESSMENT — LOCATION SIMPLE DESCRIPTION DERM
LOCATION SIMPLE: LEFT TEMPLE
LOCATION SIMPLE: LEFT PRETIBIAL REGION
LOCATION SIMPLE: RIGHT LIP
LOCATION SIMPLE: LEFT FOREARM

## 2018-07-18 NOTE — HPI: BODY LOCATION - LIP
How Severe Is Your Condition?: mild
Additional History: Been treating with Alcometasone Diproprionate

## 2019-02-21 PROBLEM — R03.0 ELEVATED BLOOD PRESSURE READING WITHOUT DIAGNOSIS OF HYPERTENSION: Status: ACTIVE | Noted: 2019-02-21

## 2019-02-21 PROBLEM — E78.00 PURE HYPERCHOLESTEROLEMIA: Status: ACTIVE | Noted: 2019-02-21

## 2019-02-27 ENCOUNTER — APPOINTMENT (RX ONLY)
Dept: URBAN - METROPOLITAN AREA CLINIC 24 | Facility: CLINIC | Age: 74
Setting detail: DERMATOLOGY
End: 2019-02-27

## 2019-02-27 DIAGNOSIS — L82.1 OTHER SEBORRHEIC KERATOSIS: ICD-10-CM

## 2019-02-27 DIAGNOSIS — L72.8 OTHER FOLLICULAR CYSTS OF THE SKIN AND SUBCUTANEOUS TISSUE: ICD-10-CM

## 2019-02-27 DIAGNOSIS — Z85.828 PERSONAL HISTORY OF OTHER MALIGNANT NEOPLASM OF SKIN: ICD-10-CM

## 2019-02-27 DIAGNOSIS — D22 MELANOCYTIC NEVI: ICD-10-CM

## 2019-02-27 DIAGNOSIS — L82.0 INFLAMED SEBORRHEIC KERATOSIS: ICD-10-CM

## 2019-02-27 PROBLEM — L55.1 SUNBURN OF SECOND DEGREE: Status: ACTIVE | Noted: 2019-02-27

## 2019-02-27 PROBLEM — D22.5 MELANOCYTIC NEVI OF TRUNK: Status: ACTIVE | Noted: 2019-02-27

## 2019-02-27 PROCEDURE — ? COUNSELING

## 2019-02-27 PROCEDURE — ? LIQUID NITROGEN

## 2019-02-27 PROCEDURE — 99214 OFFICE O/P EST MOD 30 MIN: CPT | Mod: 25

## 2019-02-27 PROCEDURE — ? OTHER

## 2019-02-27 PROCEDURE — 17110 DESTRUCTION B9 LES UP TO 14: CPT

## 2019-02-27 ASSESSMENT — LOCATION ZONE DERM
LOCATION ZONE: HAND
LOCATION ZONE: FACE
LOCATION ZONE: ARM
LOCATION ZONE: LEG
LOCATION ZONE: NECK
LOCATION ZONE: EYELID
LOCATION ZONE: TRUNK

## 2019-02-27 ASSESSMENT — LOCATION DETAILED DESCRIPTION DERM
LOCATION DETAILED: LEFT DISTAL POSTERIOR THIGH
LOCATION DETAILED: PERIUMBILICAL SKIN
LOCATION DETAILED: LEFT LATERAL TEMPLE
LOCATION DETAILED: LEFT PROXIMAL DORSAL FOREARM
LOCATION DETAILED: RIGHT INFERIOR ANTERIOR NECK
LOCATION DETAILED: LEFT MEDIAL CANTHUS
LOCATION DETAILED: RIGHT MID-UPPER BACK
LOCATION DETAILED: RIGHT ULNAR DORSAL HAND
LOCATION DETAILED: RIGHT PROXIMAL DORSAL FOREARM
LOCATION DETAILED: LEFT DISTAL PRETIBIAL REGION
LOCATION DETAILED: INFERIOR THORACIC SPINE
LOCATION DETAILED: RIGHT RADIAL DORSAL HAND

## 2019-02-27 ASSESSMENT — LOCATION SIMPLE DESCRIPTION DERM
LOCATION SIMPLE: RIGHT UPPER BACK
LOCATION SIMPLE: RIGHT FOREARM
LOCATION SIMPLE: LEFT POSTERIOR THIGH
LOCATION SIMPLE: RIGHT HAND
LOCATION SIMPLE: LEFT TEMPLE
LOCATION SIMPLE: UPPER BACK
LOCATION SIMPLE: ABDOMEN
LOCATION SIMPLE: LEFT FOREARM
LOCATION SIMPLE: LEFT EYELID
LOCATION SIMPLE: RIGHT ANTERIOR NECK
LOCATION SIMPLE: LEFT PRETIBIAL REGION

## 2019-02-27 NOTE — PROCEDURE: MIPS QUALITY
Quality 130: Documentation Of Current Medications In The Medical Record: Current Medications Documented
Quality 111:Pneumonia Vaccination Status For Older Adults: Pneumococcal Vaccination Previously Received
Detail Level: Simple
Quality 110: Preventive Care And Screening: Influenza Immunization: Influenza Immunization previously received during influenza season

## 2019-02-27 NOTE — PROCEDURE: LIQUID NITROGEN
Add 52 Modifier (Optional): no
Medical Necessity Information: It is in your best interest to select a reason for this procedure from the list below. All of these items fulfill various CMS LCD requirements except the new and changing color options.
Detail Level: Detailed
Medical Necessity Clause: This procedure was medically necessary because the lesions that were treated were irritated and enlarging
Consent: The patient's verbal consent was obtained including but not limited to risks of crusting, scabbing, blistering, scarring, darker or lighter pigmentary change, recurrence, incomplete removal and infection.
Post-Care Instructions: I reviewed with the patient in detail post-care instructions. Patient is to wear sunprotection, and avoid picking at any of the treated lesions. Pt may apply Vaseline to crusted or scabbing areas.
Number Of Freeze-Thaw Cycles: 1 freeze-thaw cycle

## 2019-11-11 PROBLEM — R35.1 NOCTURIA: Status: ACTIVE | Noted: 2019-11-11

## 2019-11-27 ENCOUNTER — HOSPITAL ENCOUNTER (OUTPATIENT)
Dept: MAMMOGRAPHY | Age: 74
Discharge: HOME | End: 2019-11-27
Attending: FAMILY MEDICINE
Payer: MEDICARE

## 2019-11-27 DIAGNOSIS — E28.39 ESTROGEN DEFICIENCY: ICD-10-CM

## 2019-11-27 PROCEDURE — 77080 DXA BONE DENSITY AXIAL: CPT

## 2019-12-18 PROBLEM — N30.10 INTERSTITIAL CYSTITIS: Status: ACTIVE | Noted: 2019-11-11

## 2019-12-30 ENCOUNTER — APPOINTMENT (RX ONLY)
Dept: URBAN - METROPOLITAN AREA CLINIC 23 | Facility: CLINIC | Age: 74
Setting detail: DERMATOLOGY
End: 2019-12-30

## 2019-12-30 ENCOUNTER — RX ONLY (OUTPATIENT)
Age: 74
Setting detail: RX ONLY
End: 2019-12-30

## 2019-12-30 DIAGNOSIS — L72.0 EPIDERMAL CYST: ICD-10-CM

## 2019-12-30 DIAGNOSIS — L50.1 IDIOPATHIC URTICARIA: ICD-10-CM

## 2019-12-30 PROCEDURE — 99213 OFFICE O/P EST LOW 20 MIN: CPT

## 2019-12-30 PROCEDURE — ? COUNSELING

## 2019-12-30 PROCEDURE — ? PRESCRIPTION

## 2019-12-30 PROCEDURE — ? TREATMENT REGIMEN

## 2019-12-30 PROCEDURE — ? ORDER TESTS

## 2019-12-30 ASSESSMENT — LOCATION DETAILED DESCRIPTION DERM
LOCATION DETAILED: PERIUMBILICAL SKIN
LOCATION DETAILED: LEFT SUPERIOR FOREHEAD

## 2019-12-30 ASSESSMENT — LOCATION SIMPLE DESCRIPTION DERM
LOCATION SIMPLE: ABDOMEN
LOCATION SIMPLE: LEFT FOREHEAD

## 2019-12-30 ASSESSMENT — LOCATION ZONE DERM
LOCATION ZONE: FACE
LOCATION ZONE: TRUNK

## 2019-12-30 NOTE — HPI: CYST
How Severe Is Your Cyst?: mild
Is This A New Presentation, Or A Follow-Up?: Cyst
Additional History: Cyst drained at urgent care center. Bactrim DS was prescribed, patient took it for 2 days and broke out in hives and has since discontinued medication.

## 2019-12-31 ENCOUNTER — APPOINTMENT (RX ONLY)
Dept: URBAN - METROPOLITAN AREA CLINIC 24 | Facility: CLINIC | Age: 74
Setting detail: DERMATOLOGY
End: 2019-12-31

## 2019-12-31 DIAGNOSIS — L72.0 EPIDERMAL CYST: ICD-10-CM

## 2019-12-31 PROCEDURE — ? INCISION AND DRAINAGE

## 2019-12-31 PROCEDURE — 10060 I&D ABSCESS SIMPLE/SINGLE: CPT

## 2019-12-31 ASSESSMENT — LOCATION DETAILED DESCRIPTION DERM: LOCATION DETAILED: RIGHT LATERAL ABDOMEN

## 2019-12-31 ASSESSMENT — LOCATION SIMPLE DESCRIPTION DERM: LOCATION SIMPLE: ABDOMEN

## 2019-12-31 ASSESSMENT — LOCATION ZONE DERM: LOCATION ZONE: TRUNK

## 2019-12-31 NOTE — PROCEDURE: INCISION AND DRAINAGE
Post-Care Instructions: I reviewed with the patient in detail post-care instructions. Patient should keep wound covered and call the office should any redness, pain, swelling or worsening occur.
Anesthesia Volume In Cc: 5
Curette: No
Size Of Lesion In Cm (Optional But May Be Required For Some Insurances): 3
Drainage Amount?: moderate
Wound Care: Vaseline
Epidermal Sutures: steri-strips
Preparation Text: The area was prepped in the usual clean fashion.
Epidermal Closure: simple interrupted
Method: sterile needle
Suture Text: No sutures .
Packing?: iodoform packing strips
Drainage Type?: purulent  and cyst-like
Detail Level: Detailed
Consent was obtained and risks were reviewed including but not limited to delayed wound healing, infection, need for multiple I and D's, and pain.
Dressing: dry sterile dressing
Anesthesia Type: 1% lidocaine with epinephrine and a 1:10 solution of 8.4% sodium bicarbonate
Lesion Type: Abscess

## 2020-01-02 ENCOUNTER — APPOINTMENT (RX ONLY)
Dept: URBAN - METROPOLITAN AREA CLINIC 24 | Facility: CLINIC | Age: 75
Setting detail: DERMATOLOGY
End: 2020-01-02

## 2020-01-02 DIAGNOSIS — L72.0 EPIDERMAL CYST: ICD-10-CM

## 2020-01-02 PROCEDURE — ? POST-OP WOUND EVALUATION

## 2020-01-02 ASSESSMENT — LOCATION SIMPLE DESCRIPTION DERM: LOCATION SIMPLE: ABDOMEN

## 2020-01-02 ASSESSMENT — LOCATION ZONE DERM: LOCATION ZONE: TRUNK

## 2020-01-02 ASSESSMENT — LOCATION DETAILED DESCRIPTION DERM: LOCATION DETAILED: PERIUMBILICAL SKIN

## 2020-01-02 NOTE — PROCEDURE: POST-OP WOUND EVALUATION
Wound Drainage?: hemorrhagic drainage
Wound Bruising?: mild
Wound Diameter In Cm(Optional): 0
Detail Level: Detailed
Patient To Follow-Up With?: our clinic
Add 76812 Cpt? (Important Note: In 2017 The Use Of 87323 Is Being Tracked By Cms To Determine Future Global Period Reimbursement For Global Periods): no
Wound Evaluated By (Optional): Dr. Siddiqi
Follow Up Units (Optional): 4
Follow Up Time Frame (Optional): days

## 2020-01-06 ENCOUNTER — APPOINTMENT (RX ONLY)
Dept: URBAN - METROPOLITAN AREA CLINIC 23 | Facility: CLINIC | Age: 75
Setting detail: DERMATOLOGY
End: 2020-01-06

## 2020-01-06 DIAGNOSIS — S31000A OPEN WOUND(S) (MULTIPLE) OF UNSPECIFIED SITE(S), WITHOUT MENTION OF COMPLICATION: ICD-10-CM

## 2020-01-06 PROBLEM — S31.109A UNSPECIFIED OPEN WOUND OF ABDOMINAL WALL, UNSPECIFIED QUADRANT WITHOUT PENETRATION INTO PERITONEAL CAVITY, INITIAL ENCOUNTER: Status: ACTIVE | Noted: 2020-01-06

## 2020-01-06 PROCEDURE — ? POST-OP WOUND CHECK

## 2020-01-06 ASSESSMENT — LOCATION ZONE DERM: LOCATION ZONE: TRUNK

## 2020-01-06 ASSESSMENT — LOCATION DETAILED DESCRIPTION DERM: LOCATION DETAILED: RIGHT LATERAL ABDOMEN

## 2020-01-06 ASSESSMENT — LOCATION SIMPLE DESCRIPTION DERM: LOCATION SIMPLE: ABDOMEN

## 2020-01-06 NOTE — PROCEDURE: POST-OP WOUND CHECK
Additional Comments: A minimal amount of cyst material was removed from the wound .  Instructed patient to continue applying Vaseline inside the wound and to clean the wound daily . Will recheck in a week.
Detail Level: Simple
Wound Evaluated By: Dr. Siddiqi
Add 62580 Cpt? (Important Note: In 2017 The Use Of 21118 Is Being Tracked By Cms To Determine Future Global Period Reimbursement For Global Periods): no

## 2020-01-13 ENCOUNTER — APPOINTMENT (RX ONLY)
Dept: URBAN - METROPOLITAN AREA CLINIC 23 | Facility: CLINIC | Age: 75
Setting detail: DERMATOLOGY
End: 2020-01-13

## 2020-01-13 DIAGNOSIS — S0182XA OPEN WOUND OF OTHER AND MULTIPLE SITES OF FACE, COMPLICATED: ICD-10-CM

## 2020-01-13 PROBLEM — S31.109A UNSPECIFIED OPEN WOUND OF ABDOMINAL WALL, UNSPECIFIED QUADRANT WITHOUT PENETRATION INTO PERITONEAL CAVITY, INITIAL ENCOUNTER: Status: ACTIVE | Noted: 2020-01-13

## 2020-01-13 PROBLEM — D48.5 NEOPLASM OF UNCERTAIN BEHAVIOR OF SKIN: Status: ACTIVE | Noted: 2020-01-13

## 2020-01-13 PROCEDURE — ? POST-OP WOUND EVALUATION

## 2020-01-13 ASSESSMENT — LOCATION SIMPLE DESCRIPTION DERM: LOCATION SIMPLE: ABDOMEN

## 2020-01-13 ASSESSMENT — LOCATION DETAILED DESCRIPTION DERM: LOCATION DETAILED: PERIUMBILICAL SKIN

## 2020-01-13 ASSESSMENT — LOCATION ZONE DERM: LOCATION ZONE: TRUNK

## 2020-01-13 NOTE — PROCEDURE: POST-OP WOUND EVALUATION
Wound Granulation?: early
Detail Level: Detailed
Wound Diameter In Cm(Optional): 0
Wound Discharge?: minimal discharge
Wound Evaluated By (Optional): Dr. Siddiqi
Wound Crusting?: clean
Add 45941 Cpt? (Important Note: In 2017 The Use Of 38189 Is Being Tracked By Cms To Determine Future Global Period Reimbursement For Global Periods): no

## 2020-02-26 ENCOUNTER — APPOINTMENT (RX ONLY)
Dept: URBAN - METROPOLITAN AREA CLINIC 23 | Facility: CLINIC | Age: 75
Setting detail: DERMATOLOGY
End: 2020-02-26

## 2020-02-26 DIAGNOSIS — D22 MELANOCYTIC NEVI: ICD-10-CM

## 2020-02-26 DIAGNOSIS — Z85.828 PERSONAL HISTORY OF OTHER MALIGNANT NEOPLASM OF SKIN: ICD-10-CM

## 2020-02-26 DIAGNOSIS — D485 NEOPLASM OF UNCERTAIN BEHAVIOR OF SKIN: ICD-10-CM

## 2020-02-26 DIAGNOSIS — L50.1 IDIOPATHIC URTICARIA: ICD-10-CM

## 2020-02-26 PROBLEM — J30.1 ALLERGIC RHINITIS DUE TO POLLEN: Status: ACTIVE | Noted: 2020-02-26

## 2020-02-26 PROBLEM — D48.5 NEOPLASM OF UNCERTAIN BEHAVIOR OF SKIN: Status: ACTIVE | Noted: 2020-02-26

## 2020-02-26 PROBLEM — D22.5 MELANOCYTIC NEVI OF TRUNK: Status: ACTIVE | Noted: 2020-02-26

## 2020-02-26 PROCEDURE — ? TREATMENT REGIMEN

## 2020-02-26 PROCEDURE — 99214 OFFICE O/P EST MOD 30 MIN: CPT | Mod: 25

## 2020-02-26 PROCEDURE — ? BIOPSY BY SHAVE METHOD

## 2020-02-26 PROCEDURE — ? COUNSELING

## 2020-02-26 PROCEDURE — 11102 TANGNTL BX SKIN SINGLE LES: CPT

## 2020-02-26 ASSESSMENT — LOCATION DETAILED DESCRIPTION DERM
LOCATION DETAILED: LEFT LATERAL TEMPLE
LOCATION DETAILED: LEFT SUPERIOR FOREHEAD
LOCATION DETAILED: LEFT INFERIOR LATERAL FOREHEAD
LOCATION DETAILED: LEFT DISTAL PRETIBIAL REGION
LOCATION DETAILED: INFERIOR THORACIC SPINE

## 2020-02-26 ASSESSMENT — LOCATION ZONE DERM
LOCATION ZONE: LEG
LOCATION ZONE: FACE
LOCATION ZONE: TRUNK

## 2020-02-26 ASSESSMENT — LOCATION SIMPLE DESCRIPTION DERM
LOCATION SIMPLE: UPPER BACK
LOCATION SIMPLE: LEFT PRETIBIAL REGION
LOCATION SIMPLE: LEFT TEMPLE
LOCATION SIMPLE: LEFT FOREHEAD

## 2020-02-26 NOTE — PROCEDURE: BIOPSY BY SHAVE METHOD
Silver Nitrate Text: The wound bed was treated with silver nitrate after the biopsy was performed.
Outside Pathology Billing: outside pathology read only
Billing Type: Third-Party Bill
Render Path Notes In Note?: No
Electrodesiccation Text: The wound bed was treated with electrodesiccation after the biopsy was performed.
Accession #: [id number]
Anesthesia Type: 1% lidocaine with 1:200,000 epinephrine and a 1:10 solution of 8.4% sodium bicarbonate
Biopsy Method: Dermablade
Detail Level: Detailed
Consent: Written consent was obtained and risks were reviewed including but not limited to scarring, infection, bleeding, scabbing, incomplete removal, nerve damage and allergy to anesthesia.
Path Notes (To The Dermatopathologist): .
Size Of Lesion In Cm: 0
Dressing: pressure dressing with telfa
Post-Care Instructions: I reviewed with the patient in detail post-care instructions. Patient is to keep the biopsy site dry overnight, and then apply bacitracin twice daily until healed. Patient may apply hydrogen peroxide soaks to remove any crusting.
Biopsy Type: H and E
Electrodesiccation And Curettage Text: The wound bed was treated with electrodesiccation and curettage after the biopsy was performed.
Was A Bandage Applied: Yes
Type Of Destruction Used: Curettage
Wound Care: Vaseline
Cryotherapy Text: The wound bed was treated with cryotherapy after the biopsy was performed.
Anesthesia Volume In Cc: 0.5
Notification Instructions: Patient will be notified of biopsy results. However, patient instructed to call the office if not contacted within 2 weeks.
Hemostasis: Electrocautery
Depth Of Biopsy: dermis

## 2020-02-26 NOTE — PROCEDURE: TREATMENT REGIMEN
Plan: Take Zyrtec in the morning and evening and take Benadryl QHS . Discussed Alisson .
Detail Level: Zone

## 2020-02-26 NOTE — PROCEDURE: MIPS QUALITY
Quality 111:Pneumonia Vaccination Status For Older Adults: Pneumococcal Vaccination Previously Received
Quality 130: Documentation Of Current Medications In The Medical Record: Current Medications Documented
Detail Level: Simple
Quality 110: Preventive Care And Screening: Influenza Immunization: Influenza Immunization previously received during influenza season

## 2020-04-07 ENCOUNTER — APPOINTMENT (RX ONLY)
Dept: URBAN - METROPOLITAN AREA CLINIC 24 | Facility: CLINIC | Age: 75
Setting detail: DERMATOLOGY
End: 2020-04-07

## 2020-04-07 PROBLEM — C44.319 BASAL CELL CARCINOMA OF SKIN OF OTHER PARTS OF FACE: Status: ACTIVE | Noted: 2020-04-07

## 2020-04-07 PROCEDURE — 12051 INTMD RPR FACE/MM 2.5 CM/<: CPT

## 2020-04-07 PROCEDURE — ? MOHS SURGERY

## 2020-04-07 PROCEDURE — 17311 MOHS 1 STAGE H/N/HF/G: CPT

## 2020-04-07 NOTE — PROCEDURE: MOHS SURGERY
Debridement Text: The wound edges were debrided prior to proceeding with the closure to facilitate wound healing.
O-Z Plasty Text: The defect edges were debeveled with a #15 scalpel blade.  Given the location of the defect, shape of the defect and the proximity to free margins an O-Z plasty (double transposition flap) was deemed most appropriate.  Using a sterile surgical marker, the appropriate transposition flaps were drawn incorporating the defect and placing the expected incisions within the relaxed skin tension lines where possible.    The area thus outlined was incised deep to adipose tissue with a #15 scalpel blade.  The skin margins were undermined to an appropriate distance in all directions utilizing iris scissors.  Hemostasis was achieved with electrocautery.  The flaps were then transposed into place, one clockwise and the other counterclockwise, and anchored with interrupted buried subcutaneous sutures.
Alar Island Pedicle Flap Text: The defect edges were debeveled with a #15 scalpel blade.  Given the location of the defect, shape of the defect and the proximity to the alar rim an island pedicle advancement flap was deemed most appropriate.  Using a sterile surgical marker, an appropriate advancement flap was drawn incorporating the defect, outlining the appropriate donor tissue and placing the expected incisions within the nasal ala running parallel to the alar rim. The area thus outlined was incised with a #15 scalpel blade.  The skin margins were undermined minimally to an appropriate distance in all directions around the primary defect and laterally outward around the island pedicle utilizing iris scissors.  There was minimal undermining beneath the pedicle flap.
Show Specific Providers When Referring To Others For Closure?: Yes
Area H Indication Text: Tumors in this location are included in Area H (eyelids, eyebrows, nose, lips, chin, ear, pre-auricular, post-auricular, temple, genitalia, hands, feet, ankles and areola).  Tissue conservation is critical in these anatomic locations.
Partial Purse String (Intermediate) Text: Given the location of the defect and the characteristics of the surrounding skin an intermediate purse string closure was deemed most appropriate.  Undermining was performed circumfirentially around the surgical defect.  A purse string suture was then placed and tightened. Wound tension only allowed a partial closure of the circular defect.
Stage 11: Additional Anesthesia Volume In Cc: 0
Provider Procedure Text (E): After obtaining clear surgical margins the defect was repaired by another provider.
Mohs Histo Method Verbiage: The section(s) were then chromacoded and processed in the Mohs lab using the Mohs protocol and submitted for frozen section.
Donor Site Anesthesia Type: same as repair anesthesia
Complex Repair Preamble Text (Leave Blank If You Do Not Want): Extensive wide undermining was performed.
Special Stains Stage 3 - Results: Base On Clearance Noted Above
Patient Will Remove Sutures At Home?: No
Melolabial Transposition Flap Text: The defect edges were debeveled with a #15 scalpel blade.  Given the location of the defect and the proximity to free margins a melolabial flap was deemed most appropriate.  Using a sterile surgical marker, an appropriate melolabial transposition flap was drawn incorporating the defect.    The area thus outlined was incised deep to adipose tissue with a #15 scalpel blade.  The skin margins were undermined to an appropriate distance in all directions utilizing iris scissors.
Rotation Flap Text: The defect edges were debeveled with a #15 scalpel blade.  Given the location of the defect, shape of the defect and the proximity to free margins a rotation flap was deemed most appropriate.  Using a sterile surgical marker, an appropriate rotation flap was drawn incorporating the defect and placing the expected incisions within the relaxed skin tension lines where possible.    The area thus outlined was incised deep to adipose tissue with a #15 scalpel blade.  The skin margins were undermined to an appropriate distance in all directions utilizing iris scissors.
Anesthesia Type: 1% lidocaine with 1:100,000 epinephrine and a 1:3 solution of 8.4% sodium bicarbonate
Plastic Surgeon Procedure Text (D): After obtaining clear surgical margins the patient was sent to plastics for surgical repair.  The patient understands they will receive post-surgical care and follow-up from the referring physician's office.
Stage 11: Additional Anesthesia Type: 1% lidocaine with epinephrine
Otolaryngologist Procedure Text (F): After obtaining clear surgical margins the patient was sent to otolaryngology for surgical repair.  The patient understands they will receive post-surgical care and follow-up from the referring physician's office.
Consent (Near Eyelid Margin)/Introductory Paragraph: The rationale for Mohs was explained to the patient and consent was obtained. The risks, benefits and alternatives to therapy were discussed in detail. Specifically, the risks of ectropion or eyelid deformity, infection, scarring, bleeding, prolonged wound healing, incomplete removal, allergy to anesthesia, nerve injury and recurrence were addressed. Prior to the procedure, the treatment site was clearly identified and confirmed by the patient. All components of Universal Protocol/PAUSE Rule completed.
Posterior Auricular Interpolation Flap Text: A decision was made to reconstruct the defect utilizing an interpolation axial flap and a staged reconstruction.  A telfa template was made of the defect.  This telfa template was then used to outline the posterior auricular interpolation flap.  The donor area for the pedicle flap was then injected with anesthesia.  The flap was excised through the skin and subcutaneous tissue down to the layer of the underlying musculature.  The pedicle flap was carefully excised within this deep plane to maintain its blood supply.  The edges of the donor site were undermined.   The donor site was closed in a primary fashion.  The pedicle was then rotated into position and sutured.  Once the tube was sutured into place, adequate blood supply was confirmed with blanching and refill.  The pedicle was then wrapped with xeroform gauze and dressed appropriately with a telfa and gauze bandage to ensure continued blood supply and protect the attached pedicle.
Referred To Oculoplastics For Closure Text (Leave Blank If You Do Not Want): After obtaining clear surgical margins the patient was sent to oculoplastics for surgical repair.  The patient understands they will receive post-surgical care and follow-up from the referring physician's office.
Hemostasis: Electrocautery
Star Wedge Flap Text: The defect edges were debeveled with a #15 scalpel blade.  Given the location of the defect, shape of the defect and the proximity to free margins a star wedge flap was deemed most appropriate.  Using a sterile surgical marker, an appropriate rotation flap was drawn incorporating the defect and placing the expected incisions within the relaxed skin tension lines where possible. The area thus outlined was incised deep to adipose tissue with a #15 scalpel blade.  The skin margins were undermined to an appropriate distance in all directions utilizing iris scissors.
No Residual Tumor Seen Histology Text: There were no malignant cells seen in the sections examined.
Helical Rim Advancement Flap Text: The defect edges were debeveled with a #15 blade scalpel.  Given the location of the defect and the proximity to free margins (helical rim) a double helical rim advancement flap was deemed most appropriate.  Using a sterile surgical marker, the appropriate advancement flaps were drawn incorporating the defect and placing the expected incisions between the helical rim and antihelix where possible.  The area thus outlined was incised through and through with a #15 scalpel blade.  With a skin hook and iris scissors, the flaps were gently and sharply undermined and freed up.
Modified Advancement Flap Text: The defect edges were debeveled with a #15 scalpel blade.  Given the location of the defect, shape of the defect and the proximity to free margins a modified advancement flap was deemed most appropriate.  Using a sterile surgical marker, an appropriate advancement flap was drawn incorporating the defect and placing the expected incisions within the relaxed skin tension lines where possible.    The area thus outlined was incised deep to adipose tissue with a #15 scalpel blade.  The skin margins were undermined to an appropriate distance in all directions utilizing iris scissors.
Alternatives Discussed Intro (Do Not Add Period): I discussed alternative treatments to Mohs surgery and specifically discussed the risks and benefits of
Mastoid Interpolation Flap Text: A decision was made to reconstruct the defect utilizing an interpolation axial flap and a staged reconstruction.  A telfa template was made of the defect.  This telfa template was then used to outline the mastoid interpolation flap.  The donor area for the pedicle flap was then injected with anesthesia.  The flap was excised through the skin and subcutaneous tissue down to the layer of the underlying musculature.  The pedicle flap was carefully excised within this deep plane to maintain its blood supply.  The edges of the donor site were undermined.   The donor site was closed in a primary fashion.  The pedicle was then rotated into position and sutured.  Once the tube was sutured into place, adequate blood supply was confirmed with blanching and refill.  The pedicle was then wrapped with xeroform gauze and dressed appropriately with a telfa and gauze bandage to ensure continued blood supply and protect the attached pedicle.
Rhombic Flap Text: The defect edges were debeveled with a #15 scalpel blade.  Given the location of the defect and the proximity to free margins a rhombic flap was deemed most appropriate.  Using a sterile surgical marker, an appropriate rhombic flap was drawn incorporating the defect.    The area thus outlined was incised deep to adipose tissue with a #15 scalpel blade.  The skin margins were undermined to an appropriate distance in all directions utilizing iris scissors.
V-Y Flap Text: The defect edges were debeveled with a #15 scalpel blade.  Given the location of the defect, shape of the defect and the proximity to free margins a V-Y flap was deemed most appropriate.  Using a sterile surgical marker, an appropriate advancement flap was drawn incorporating the defect and placing the expected incisions within the relaxed skin tension lines where possible.    The area thus outlined was incised deep to adipose tissue with a #15 scalpel blade.  The skin margins were undermined to an appropriate distance in all directions utilizing iris scissors.
Double O-Z Plasty Text: The defect edges were debeveled with a #15 scalpel blade.  Given the location of the defect, shape of the defect and the proximity to free margins a Double O-Z plasty (double transposition flap) was deemed most appropriate.  Using a sterile surgical marker, the appropriate transposition flaps were drawn incorporating the defect and placing the expected incisions within the relaxed skin tension lines where possible. The area thus outlined was incised deep to adipose tissue with a #15 scalpel blade.  The skin margins were undermined to an appropriate distance in all directions utilizing iris scissors.  Hemostasis was achieved with electrocautery.  The flaps were then transposed into place, one clockwise and the other counterclockwise, and anchored with interrupted buried subcutaneous sutures.
Advancement Flap (Single) Text: The defect edges were debeveled with a #15 scalpel blade.  Given the location of the defect and the proximity to free margins a single advancement flap was deemed most appropriate.  Using a sterile surgical marker, an appropriate advancement flap was drawn incorporating the defect and placing the expected incisions within the relaxed skin tension lines where possible.    The area thus outlined was incised deep to adipose tissue with a #15 scalpel blade.  The skin margins were undermined to an appropriate distance in all directions utilizing iris scissors.
Epidermal Sutures: 6-0 Prolene
Mucosal Advancement Flap Text: Given the location of the defect, shape of the defect and the proximity to free margins a mucosal advancement flap was deemed most appropriate. Incisions were made with a 15 blade scalpel in the appropriate fashion along the cutaneous vermillion border and the mucosal lip. The remaining actinically damaged mucosal tissue was excised.  The mucosal advancement flap was then elevated to the gingival sulcus with care taken to preserve the neurovascular structures and advanced into the primary defect. Care was taken to ensure that precise realignment of the vermillion border was achieved.
Closure 3 Information: This tab is for additional flaps and grafts above and beyond our usual structured repairs.  Please note if you enter information here it will not currently bill and you will need to add the billing information manually.
Inflammation Suggestive Of Cancer Camouflage Histology Text: There was a dense lymphocytic infiltrate which prevented adequate histologic evaluation of adjacent structures.
Deep Sutures: 5-0 Vicryl
Asc Procedure Text (A): After obtaining clear surgical margins the patient was sent to an ASC for surgical repair.  The patient understands they will receive post-surgical care and follow-up from the ASC physician.
Nostril Rim Text: The closure involved the nostril rim.
Island Pedicle Flap-Requiring Vessel Identification Text: The defect edges were debeveled with a #15 scalpel blade.  Given the location of the defect, shape of the defect and the proximity to free margins an island pedicle advancement flap was deemed most appropriate.  Using a sterile surgical marker, an appropriate advancement flap was drawn, based on the axial vessel mentioned above, incorporating the defect, outlining the appropriate donor tissue and placing the expected incisions within the relaxed skin tension lines where possible.    The area thus outlined was incised deep to adipose tissue with a #15 scalpel blade.  The skin margins were undermined to an appropriate distance in all directions around the primary defect and laterally outward around the island pedicle utilizing iris scissors.  There was minimal undermining beneath the pedicle flap.
Subsequent Stages Histo Method Verbiage: Using a similar technique to that described above, a thin layer of tissue was removed from all areas where tumor was visible on the previous stage.  The tissue was again oriented, mapped, dyed, and processed as above.
Graft Donor Site Dermal Sutures (Optional): 5-0 PDS
Composite Graft Text: The defect edges were debeveled with a #15 scalpel blade.  Given the location of the defect, shape of the defect, the proximity to free margins and the fact the defect was full thickness a composite graft was deemed most appropriate.  The defect was outline and then transferred to the donor site.  A full thickness graft was then excised from the donor site. The graft was then placed in the primary defect, oriented appropriately and then sutured into place.  The secondary defect was then repaired using a primary closure.
S Plasty Text: Given the location and shape of the defect, and the orientation of relaxed skin tension lines, an S-plasty was deemed most appropriate for repair.  Using a sterile surgical marker, the appropriate outline of the S-plasty was drawn, incorporating the defect and placing the expected incisions within the relaxed skin tension lines where possible.  The area thus outlined was incised deep to adipose tissue with a #15 scalpel blade.  The skin margins were undermined to an appropriate distance in all directions utilizing iris scissors. The skin flaps were advanced over the defect.  The opposing margins were then approximated with interrupted buried subcutaneous sutures.
Mid-Level Procedure Text (E): After obtaining clear surgical margins the patient was sent to a mid-level provider for surgical repair.  The patient understands they will receive post-surgical care and follow-up from the mid-level provider.
O-L Flap Text: The defect edges were debeveled with a #15 scalpel blade.  Given the location of the defect, shape of the defect and the proximity to free margins an O-L flap was deemed most appropriate.  Using a sterile surgical marker, an appropriate advancement flap was drawn incorporating the defect and placing the expected incisions within the relaxed skin tension lines where possible.    The area thus outlined was incised deep to adipose tissue with a #15 scalpel blade.  The skin margins were undermined to an appropriate distance in all directions utilizing iris scissors.
Dermal Autograft Text: The defect edges were debeveled with a #15 scalpel blade.  Given the location of the defect, shape of the defect and the proximity to free margins a dermal autograft was deemed most appropriate.  Using a sterile surgical marker, the primary defect shape was transferred to the donor site. The area thus outlined was incised deep to adipose tissue with a #15 scalpel blade.  The harvested graft was then trimmed of adipose and epidermal tissue until only dermis was left.  The skin graft was then placed in the primary defect and oriented appropriately.
Burow's Advancement Flap Text: The defect edges were debeveled with a #15 scalpel blade.  Given the location of the defect and the proximity to free margins a Burow's advancement flap was deemed most appropriate.  Using a sterile surgical marker, the appropriate advancement flap was drawn incorporating the defect and placing the expected incisions within the relaxed skin tension lines where possible.    The area thus outlined was incised deep to adipose tissue with a #15 scalpel blade.  The skin margins were undermined to an appropriate distance in all directions utilizing iris scissors.
Island Pedicle Flap With Canthal Suspension Text: The defect edges were debeveled with a #15 scalpel blade.  Given the location of the defect, shape of the defect and the proximity to free margins an island pedicle advancement flap was deemed most appropriate.  Using a sterile surgical marker, an appropriate advancement flap was drawn incorporating the defect, outlining the appropriate donor tissue and placing the expected incisions within the relaxed skin tension lines where possible. The area thus outlined was incised deep to adipose tissue with a #15 scalpel blade.  The skin margins were undermined to an appropriate distance in all directions around the primary defect and laterally outward around the island pedicle utilizing iris scissors.  There was minimal undermining beneath the pedicle flap. A suspension suture was placed in the canthal tendon to prevent tension and prevent ectropion.
Plastic Surgeon (A): Dr Titus
Purse String (Intermediate) Text: Given the location of the defect and the characteristics of the surrounding skin a pursestring intermediate closure was deemed most appropriate.  Undermining was performed circumfirentially around the surgical defect.  A purstring suture was then placed and tightened.
Advancement Flap (Double) Text: The defect edges were debeveled with a #15 scalpel blade.  Given the location of the defect and the proximity to free margins a double advancement flap was deemed most appropriate.  Using a sterile surgical marker, the appropriate advancement flaps were drawn incorporating the defect and placing the expected incisions within the relaxed skin tension lines where possible.    The area thus outlined was incised deep to adipose tissue with a #15 scalpel blade.  The skin margins were undermined to an appropriate distance in all directions utilizing iris scissors.
Mohs Method Verbiage: An incision at a 45 degree angle following the standard Mohs approach was done and the specimen was harvested as a microscopic controlled layer.
Suturegard Intro: Intraoperative tissue expansion was performed, utilizing the SUTUREGARD device, in order to reduce wound tension.
Retention Suture Text: Retention sutures were placed to support the closure and prevent dehiscence.
Melolabial Interpolation Flap Text: A decision was made to reconstruct the defect utilizing an interpolation axial flap and a staged reconstruction.  A telfa template was made of the defect.  This telfa template was then used to outline the melolabial interpolation flap.  The donor area for the pedicle flap was then injected with anesthesia.  The flap was excised through the skin and subcutaneous tissue down to the layer of the underlying musculature.  The pedicle flap was carefully excised within this deep plane to maintain its blood supply.  The edges of the donor site were undermined.   The donor site was closed in a primary fashion.  The pedicle was then rotated into position and sutured.  Once the tube was sutured into place, adequate blood supply was confirmed with blanching and refill.  The pedicle was then wrapped with xeroform gauze and dressed appropriately with a telfa and gauze bandage to ensure continued blood supply and protect the attached pedicle.
Ear Star Wedge Flap Text: The defect edges were debeveled with a #15 blade scalpel.  Given the location of the defect and the proximity to free margins (helical rim) an ear star wedge flap was deemed most appropriate.  Using a sterile surgical marker, the appropriate flap was drawn incorporating the defect and placing the expected incisions between the helical rim and antihelix where possible.  The area thus outlined was incised through and through with a #15 scalpel blade.
Area M Indication Text: Tumors in this location are included in Area M (cheek, forehead, scalp, neck, jawline and pretibial skin).  Mohs surgery is indicated for tumors 1 cm or larger in these anatomic locations.
Cheiloplasty (Complex) Text: A decision was made to reconstruct the defect with a  cheiloplasty.  The defect was undermined extensively.  Additional obicularis oris muscle was excised with a 15 blade scalpel.  The defect was converted into a full thickness wedge to facilite a better cosmetic result.  Small vessels were then tied off with 5-0 monocyrl. The obicularis oris, superficial fascia, adipose and dermis were then reapproximated.  After the deeper layers were approximated the epidermis was reapproximated with particular care given to realign the vermillion border.
Pain Refusal Text: I offered to prescribe pain medication but the patient refused to take this medication.
Surgeon Performing Repair (Optional): Dr Siddiqi
Consent (Marginal Mandibular)/Introductory Paragraph: The rationale for Mohs was explained to the patient and consent was obtained. The risks, benefits and alternatives to therapy were discussed in detail. Specifically, the risks of damage to the marginal mandibular branch of the facial nerve, infection, scarring, bleeding, prolonged wound healing, incomplete removal, allergy to anesthesia, and recurrence were addressed. Prior to the procedure, the treatment site was clearly identified and confirmed by the patient. All components of Universal Protocol/PAUSE Rule completed.
Home Suture Removal Text: Patient was provided instructions on removing sutures and will remove their sutures at home.  If they have any questions or difficulties they will call the office.
Consent Type: Consent 1 (Standard)
Anesthesia Type: 1% lidocaine without epinephrine and a 1:3 solution of 8.4% sodium bicarbonate
Xenograft Text: The defect edges were debeveled with a #15 scalpel blade.  Given the location of the defect, shape of the defect and the proximity to free margins a xenograft was deemed most appropriate.  The graft was then trimmed to fit the size of the defect.  The graft was then placed in the primary defect and oriented appropriately.
Bilateral Helical Rim Advancement Flap Text: The defect edges were debeveled with a #15 blade scalpel.  Given the location of the defect and the proximity to free margins (helical rim) a bilateral helical rim advancement flap was deemed most appropriate.  Using a sterile surgical marker, the appropriate advancement flaps were drawn incorporating the defect and placing the expected incisions between the helical rim and antihelix where possible.  The area thus outlined was incised through and through with a #15 scalpel blade.  With a skin hook and iris scissors, the flaps were gently and sharply undermined and freed up.
Anesthesia Volume In Cc: 1
Suturegard Retention Suture: 2-0 Nylon
Graft Donor Site Bandage (Optional-Leave Blank If You Don't Want In Note): Steri-strips and a pressure bandage were applied to the donor site.
Surgeon: George brown
Double M-Plasty Intermediate Repair Preamble Text (Leave Blank If You Do Not Want): Undermining was performed with blunt dissection.
Complex Repair And Flap Additional Text (Will Appearing After The Standard Complex Repair Text): The complex repair was not sufficient to completely close the primary defect. The remaining additional defect was repaired with the flap mentioned below.
Dorsal Nasal Flap Text: The defect edges were debeveled with a #15 scalpel blade.  Given the location of the defect and the proximity to free margins a dorsal nasal flap was deemed most appropriate.  Using a sterile surgical marker, an appropriate dorsal nasal flap was drawn around the defect.    The area thus outlined was incised deep to adipose tissue with a #15 scalpel blade.  The skin margins were undermined to an appropriate distance in all directions utilizing iris scissors.
Unna Boot Text: An Unna boot was placed to help immobilize the limb and facilitate more rapid healing.
Graft Cartilage Fenestration Text: The cartilage was fenestrated with a 2mm punch biopsy to help facilitate graft survival and healing.
Complex Repair And Graft Additional Text (Will Appearing After The Standard Complex Repair Text): The complex repair was not sufficient to completely close the primary defect. The remaining additional defect was repaired with the graft mentioned below.
Consent (Lip)/Introductory Paragraph: The rationale for Mohs was explained to the patient and consent was obtained. The risks, benefits and alternatives to therapy were discussed in detail. Specifically, the risks of lip deformity, changes in the oral aperture, infection, scarring, bleeding, prolonged wound healing, incomplete removal, allergy to anesthesia, nerve injury and recurrence were addressed. Prior to the procedure, the treatment site was clearly identified and confirmed by the patient. All components of Universal Protocol/PAUSE Rule completed.
Primary Defect Width In Cm (Final Defect Size - Required For Flaps/Grafts): 0.5
Consent 3/Introductory Paragraph: I gave the patient a chance to ask questions they had about the procedure.  Following this I explained the Mohs procedure and consent was obtained. The risks, benefits and alternatives to therapy were discussed in detail. Specifically, the risks of infection, scarring, bleeding, prolonged wound healing, incomplete removal, allergy to anesthesia, nerve injury and recurrence were addressed. Prior to the procedure, the treatment site was clearly identified and confirmed by the patient. All components of Universal Protocol/PAUSE Rule completed.
O-Z Flap Text: The defect edges were debeveled with a #15 scalpel blade.  Given the location of the defect, shape of the defect and the proximity to free margins an O-Z flap was deemed most appropriate.  Using a sterile surgical marker, an appropriate transposition flap was drawn incorporating the defect and placing the expected incisions within the relaxed skin tension lines where possible. The area thus outlined was incised deep to adipose tissue with a #15 scalpel blade.  The skin margins were undermined to an appropriate distance in all directions utilizing iris scissors.
Split-Thickness Skin Graft Text: The defect edges were debeveled with a #15 scalpel blade.  Given the location of the defect, shape of the defect and the proximity to free margins a split thickness skin graft was deemed most appropriate.  Using a sterile surgical marker, the primary defect shape was transferred to the donor site. The split thickness graft was then harvested.  The skin graft was then placed in the primary defect and oriented appropriately.
Bcc Histology Text: There were numerous aggregates of basaloid cells.
Dressing (No Sutures): dry sterile dressing
Repair Type: Intermediate Layered Repair
Chonodrocutaneous Helical Advancement Flap Text: The defect edges were debeveled with a #15 scalpel blade.  Given the location of the defect and the proximity to free margins a chondrocutaneous helical advancement flap was deemed most appropriate.  Using a sterile surgical marker, the appropriate advancement flap was drawn incorporating the defect and placing the expected incisions within the relaxed skin tension lines where possible.    The area thus outlined was incised deep to adipose tissue with a #15 scalpel blade.  The skin margins were undermined to an appropriate distance in all directions utilizing iris scissors.
Skin Substitute Text: The defect edges were debeveled with a #15 scalpel blade.  Given the location of the defect, shape of the defect and the proximity to free margins a skin substitute graft was deemed most appropriate.  The graft material was trimmed to fit the size of the defect. The graft was then placed in the primary defect and oriented appropriately.
Localized Dermabrasion With Wire Brush Text: The patient was draped in routine manner.  Localized dermabrasion using 3 x 17 mm wire brush was performed in routine manner to papillary dermis. This spot dermabrasion is being performed to complete skin cancer reconstruction. It also will eliminate the other sun damaged precancerous cells that are known to be part of the regional effect of a lifetime's worth of sun exposure. This localized dermabrasion is therapeutic and should not be considered cosmetic in any regard.
Rhomboid Transposition Flap Text: The defect edges were debeveled with a #15 scalpel blade.  Given the location of the defect and the proximity to free margins a rhomboid transposition flap was deemed most appropriate.  Using a sterile surgical marker, an appropriate rhomboid flap was drawn incorporating the defect.    The area thus outlined was incised deep to adipose tissue with a #15 scalpel blade.  The skin margins were undermined to an appropriate distance in all directions utilizing iris scissors.
Cheek-To-Nose Interpolation Flap Text: A decision was made to reconstruct the defect utilizing an interpolation axial flap and a staged reconstruction.  A telfa template was made of the defect.  This telfa template was then used to outline the Cheek-To-Nose Interpolation flap.  The donor area for the pedicle flap was then injected with anesthesia.  The flap was excised through the skin and subcutaneous tissue down to the layer of the underlying musculature.  The interpolation flap was carefully excised within this deep plane to maintain its blood supply.  The edges of the donor site were undermined.   The donor site was closed in a primary fashion.  The pedicle was then rotated into position and sutured.  Once the tube was sutured into place, adequate blood supply was confirmed with blanching and refill.  The pedicle was then wrapped with xeroform gauze and dressed appropriately with a telfa and gauze bandage to ensure continued blood supply and protect the attached pedicle.
Information: Selecting Yes will display possible errors in your note based on the variables you have selected. This validation is only offered as a suggestion for you. PLEASE NOTE THAT THE VALIDATION TEXT WILL BE REMOVED WHEN YOU FINALIZE YOUR NOTE. IF YOU WANT TO FAX A PRELIMINARY NOTE YOU WILL NEED TO TOGGLE THIS TO 'NO' IF YOU DO NOT WANT IT IN YOUR FAXED NOTE.
Secondary Intention Text (Leave Blank If You Do Not Want): The defect will heal with secondary intention.
Cheek Interpolation Flap Text: A decision was made to reconstruct the defect utilizing an interpolation axial flap and a staged reconstruction.  A telfa template was made of the defect.  This telfa template was then used to outline the Cheek Interpolation flap.  The donor area for the pedicle flap was then injected with anesthesia.  The flap was excised through the skin and subcutaneous tissue down to the layer of the underlying musculature.  The interpolation flap was carefully excised within this deep plane to maintain its blood supply.  The edges of the donor site were undermined.   The donor site was closed in a primary fashion.  The pedicle was then rotated into position and sutured.  Once the tube was sutured into place, adequate blood supply was confirmed with blanching and refill.  The pedicle was then wrapped with xeroform gauze and dressed appropriately with a telfa and gauze bandage to ensure continued blood supply and protect the attached pedicle.
V-Y Plasty Text: The defect edges were debeveled with a #15 scalpel blade.  Given the location of the defect, shape of the defect and the proximity to free margins an V-Y advancement flap was deemed most appropriate.  Using a sterile surgical marker, an appropriate advancement flap was drawn incorporating the defect and placing the expected incisions within the relaxed skin tension lines where possible.    The area thus outlined was incised deep to adipose tissue with a #15 scalpel blade.  The skin margins were undermined to an appropriate distance in all directions utilizing iris scissors.
Bi-Rhombic Flap Text: The defect edges were debeveled with a #15 scalpel blade.  Given the location of the defect and the proximity to free margins a bi-rhombic flap was deemed most appropriate.  Using a sterile surgical marker, an appropriate rhombic flap was drawn incorporating the defect. The area thus outlined was incised deep to adipose tissue with a #15 scalpel blade.  The skin margins were undermined to an appropriate distance in all directions utilizing iris scissors.
Transposition Flap Text: The defect edges were debeveled with a #15 scalpel blade.  Given the location of the defect and the proximity to free margins a transposition flap was deemed most appropriate.  Using a sterile surgical marker, an appropriate transposition flap was drawn incorporating the defect.    The area thus outlined was incised deep to adipose tissue with a #15 scalpel blade.  The skin margins were undermined to an appropriate distance in all directions utilizing iris scissors.
Interpolation Flap Text: A decision was made to reconstruct the defect utilizing an interpolation axial flap and a staged reconstruction.  A telfa template was made of the defect.  This telfa template was then used to outline the interpolation flap.  The donor area for the pedicle flap was then injected with anesthesia.  The flap was excised through the skin and subcutaneous tissue down to the layer of the underlying musculature.  The interpolation flap was carefully excised within this deep plane to maintain its blood supply.  The edges of the donor site were undermined.   The donor site was closed in a primary fashion.  The pedicle was then rotated into position and sutured.  Once the tube was sutured into place, adequate blood supply was confirmed with blanching and refill.  The pedicle was then wrapped with xeroform gauze and dressed appropriately with a telfa and gauze bandage to ensure continued blood supply and protect the attached pedicle.
Consent (Ear)/Introductory Paragraph: The rationale for Mohs was explained to the patient and consent was obtained. The risks, benefits and alternatives to therapy were discussed in detail. Specifically, the risks of ear deformity, infection, scarring, bleeding, prolonged wound healing, incomplete removal, allergy to anesthesia, nerve injury and recurrence were addressed. Prior to the procedure, the treatment site was clearly identified and confirmed by the patient. All components of Universal Protocol/PAUSE Rule completed.
Consent 1/Introductory Paragraph: The rationale for Mohs was explained to the patient and consent was obtained. The risks, benefits and alternatives to therapy were discussed in detail. Specifically, the risks of infection, scarring, bleeding, prolonged wound healing, incomplete removal, allergy to anesthesia, nerve injury and recurrence were addressed. Prior to the procedure, the treatment site was clearly identified and confirmed by the patient. All components of Universal Protocol/PAUSE Rule completed.
Advancement-Rotation Flap Text: The defect edges were debeveled with a #15 scalpel blade.  Given the location of the defect, shape of the defect and the proximity to free margins an advancement-rotation flap was deemed most appropriate.  Using a sterile surgical marker, an appropriate flap was drawn incorporating the defect and placing the expected incisions within the relaxed skin tension lines where possible. The area thus outlined was incised deep to adipose tissue with a #15 scalpel blade.  The skin margins were undermined to an appropriate distance in all directions utilizing iris scissors.
Area L Indication Text: Tumors in this location are included in Area L (trunk and extremities).  Mohs surgery is indicated for larger tumors, 2 cm or larger, in these anatomic locations.
Crescentic Advancement Flap Text: The defect edges were debeveled with a #15 scalpel blade.  Given the location of the defect and the proximity to free margins a crescentic advancement flap was deemed most appropriate.  Using a sterile surgical marker, the appropriate advancement flap was drawn incorporating the defect and placing the expected incisions within the relaxed skin tension lines where possible.    The area thus outlined was incised deep to adipose tissue with a #15 scalpel blade.  The skin margins were undermined to an appropriate distance in all directions utilizing iris scissors.
Hatchet Flap Text: The defect edges were debeveled with a #15 scalpel blade.  Given the location of the defect, shape of the defect and the proximity to free margins a hatchet flap was deemed most appropriate.  Using a sterile surgical marker, an appropriate hatchet flap was drawn incorporating the defect and placing the expected incisions within the relaxed skin tension lines where possible.    The area thus outlined was incised deep to adipose tissue with a #15 scalpel blade.  The skin margins were undermined to an appropriate distance in all directions utilizing iris scissors.
Repair Anesthesia Method: local infiltration
Ftsg Text: The defect edges were debeveled with a #15 scalpel blade.  Given the location of the defect, shape of the defect and the proximity to free margins a full thickness skin graft was deemed most appropriate.  Using a sterile surgical marker, the primary defect shape was transferred to the donor site. The area thus outlined was incised deep to adipose tissue with a #15 scalpel blade.  The harvested graft was then trimmed of adipose tissue until only dermis and epidermis was left.  The skin margins of the secondary defect were undermined to an appropriate distance in all directions utilizing iris scissors.  The secondary defect was closed with interrupted buried subcutaneous sutures.  The skin edges were then re-apposed with running  sutures.  The skin graft was then placed in the primary defect and oriented appropriately.
Double O-Z Flap Text: The defect edges were debeveled with a #15 scalpel blade.  Given the location of the defect, shape of the defect and the proximity to free margins a Double O-Z flap was deemed most appropriate.  Using a sterile surgical marker, an appropriate transposition flap was drawn incorporating the defect and placing the expected incisions within the relaxed skin tension lines where possible. The area thus outlined was incised deep to adipose tissue with a #15 scalpel blade.  The skin margins were undermined to an appropriate distance in all directions utilizing iris scissors.
Keystone Flap Text: The defect edges were debeveled with a #15 scalpel blade.  Given the location of the defect, shape of the defect a keystone flap was deemed most appropriate.  Using a sterile surgical marker, an appropriate keystone flap was drawn incorporating the defect, outlining the appropriate donor tissue and placing the expected incisions within the relaxed skin tension lines where possible. The area thus outlined was incised deep to adipose tissue with a #15 scalpel blade.  The skin margins were undermined to an appropriate distance in all directions around the primary defect and laterally outward around the flap utilizing iris scissors.
Mohs Rapid Report Verbiage: The area of clinically evident tumor was marked with skin marking ink and appropriately hatched.  The initial incision was made following the Mohs approach through the skin.  The specimen was taken to the lab, divided into the necessary number of pieces, chromacoded and processed according to the Mohs protocol.  This was repeated in successive stages until a tumor free defect was achieved.
Muscle Hinge Flap Text: The defect edges were debeveled with a #15 scalpel blade.  Given the size, depth and location of the defect and the proximity to free margins a muscle hinge flap was deemed most appropriate.  Using a sterile surgical marker, an appropriate hinge flap was drawn incorporating the defect. The area thus outlined was incised with a #15 scalpel blade.  The skin margins were undermined to an appropriate distance in all directions utilizing iris scissors.
Manual Repair Warning Statement: We plan on removing the manually selected variable below in favor of our much easier automatic structured text blocks found in the previous tab. We decided to do this to help make the flow better and give you the full power of structured data. Manual selection is never going to be ideal in our platform and I would encourage you to avoid using manual selection from this point on, especially since I will be sunsetting this feature. It is important that you do one of two things with the customized text below. First, you can save all of the text in a word file so you can have it for future reference. Second, transfer the text to the appropriate area in the Library tab. Lastly, if there is a flap or graft type which we do not have you need to let us know right away so I can add it in before the variable is hidden. No need to panic, we plan to give you roughly 6 months to make the change.
Mart-1 - Positive Histology Text: MART-1 staining demonstrates areas of higher density and clustering of melanocytes with Pagetoid spread upwards within the epidermis. The surgical margins are positive for tumor cells.
Post-Care Instructions: I reviewed with the patient in detail post-care instructions. Patient is not to engage in any heavy lifting, exercise, or swimming for the next 14 days. Should the patient develop any fevers, chills, bleeding, severe pain patient will contact the office immediately..\\n.
Trilobed Flap Text: The defect edges were debeveled with a #15 scalpel blade.  Given the location of the defect and the proximity to free margins a trilobed flap was deemed most appropriate.  Using a sterile surgical marker, an appropriate trilobed flap drawn around the defect.    The area thus outlined was incised deep to adipose tissue with a #15 scalpel blade.  The skin margins were undermined to an appropriate distance in all directions utilizing iris scissors.
Consent (Spinal Accessory)/Introductory Paragraph: The rationale for Mohs was explained to the patient and consent was obtained. The risks, benefits and alternatives to therapy were discussed in detail. Specifically, the risks of damage to the spinal accessory nerve, infection, scarring, bleeding, prolonged wound healing, incomplete removal, allergy to anesthesia, and recurrence were addressed. Prior to the procedure, the treatment site was clearly identified and confirmed by the patient. All components of Universal Protocol/PAUSE Rule completed.
Initial Size Of Lesion: 0.3
Retention Suture Bite Size: 3 mm
Partial Purse String (Simple) Text: Given the location of the defect and the characteristics of the surrounding skin a simple purse string closure was deemed most appropriate.  Undermining was performed circumfirentially around the surgical defect.  A purse string suture was then placed and tightened. Wound tension only allowed a partial closure of the circular defect.
Bilobed Transposition Flap Text: The defect edges were debeveled with a #15 scalpel blade.  Given the location of the defect and the proximity to free margins a bilobed transposition flap was deemed most appropriate.  Using a sterile surgical marker, an appropriate bilobe flap drawn around the defect.    The area thus outlined was incised deep to adipose tissue with a #15 scalpel blade.  The skin margins were undermined to an appropriate distance in all directions utilizing iris scissors.
Surgeon/Pathologist Verbiage (Will Incorporate Name Of Surgeon From Intro If Not Blank): operated in two distinct and integrated capacities as the surgeon and pathologist.
Suturegard Body: The suture ends were repeatedly re-tightened and re-clamped to achieve the desired tissue expansion.
Banner Transposition Flap Text: The defect edges were debeveled with a #15 scalpel blade.  Given the location of the defect and the proximity to free margins a Banner transposition flap was deemed most appropriate.  Using a sterile surgical marker, an appropriate flap drawn around the defect. The area thus outlined was incised deep to adipose tissue with a #15 scalpel blade.  The skin margins were undermined to an appropriate distance in all directions utilizing iris scissors.
Non-Graft Cartilage Fenestration Text: The cartilage was fenestrated with a 2mm punch biopsy to help facilitate healing.
Same Histology In Subsequent Stages Text: The pattern and morphology of the tumor is as described in the first stage.
Burow's Graft Text: The defect edges were debeveled with a #15 scalpel blade.  Given the location of the defect, shape of the defect, the proximity to free margins and the presence of a standing cone deformity a Burow's skin graft was deemed most appropriate. The standing cone was removed and this tissue was then trimmed to the shape of the primary defect. The adipose tissue was also removed until only dermis and epidermis were left.  The skin margins of the secondary defect were undermined to an appropriate distance in all directions utilizing iris scissors.  The secondary defect was closed with interrupted buried subcutaneous sutures.  The skin edges were then re-apposed with running  sutures.  The skin graft was then placed in the primary defect and oriented appropriately.
Ear Wedge Repair Text: A wedge excision was completed by carrying down an excision through the full thickness of the ear and cartilage with an inward facing Burow's triangle. The wound was then closed in a layered fashion.
Previous Accession (Optional): CHARI HERNANDEZ 
Anesthesia Volume In Cc: 2
Undermining Type: Entire Wound
Medical Necessity Statement: Based on my medical judgement, Mohs surgery is the most appropriate treatment for this cancer compared to other treatments.
Closure 2 Information: This tab is for additional flaps and grafts, including complex repair and grafts and complex repair and flaps. You can also specify a different location for the additional defect, if the location is the same you do not need to select a new one. We will insert the automated text for the repair you select below just as we do for solitary flaps and grafts. Please note that at this time if you select a location with a different insurance zone you will need to override the ICD10 and CPT if appropriate.
Double Island Pedicle Flap Text: The defect edges were debeveled with a #15 scalpel blade.  Given the location of the defect, shape of the defect and the proximity to free margins a double island pedicle advancement flap was deemed most appropriate.  Using a sterile surgical marker, an appropriate advancement flap was drawn incorporating the defect, outlining the appropriate donor tissue and placing the expected incisions within the relaxed skin tension lines where possible.    The area thus outlined was incised deep to adipose tissue with a #15 scalpel blade.  The skin margins were undermined to an appropriate distance in all directions around the primary defect and laterally outward around the island pedicle utilizing iris scissors.  There was minimal undermining beneath the pedicle flap.
Postop Diagnosis: same
Wound Care: Bacitracin
Z Plasty Text: The lesion was extirpated to the level of the fat with a #15 scalpel blade.  Given the location of the defect, shape of the defect and the proximity to free margins a Z-plasty was deemed most appropriate for repair.  Using a sterile surgical marker, the appropriate transposition arms of the Z-plasty were drawn incorporating the defect and placing the expected incisions within the relaxed skin tension lines where possible.    The area thus outlined was incised deep to adipose tissue with a #15 scalpel blade.  The skin margins were undermined to an appropriate distance in all directions utilizing iris scissors.  The opposing transposition arms were then transposed into place in opposite direction and anchored with interrupted buried subcutaneous sutures.
Staging Info: By selecting yes to the question above you will include information on AJCC 8 tumor staging in your Mohs note. Information on tumor staging will be automatically added for SCCs on the head and neck. AJCC 8 includes tumor size, tumor depth, perineural involvement and bone invasion.
Stage 3: Additional Anesthesia Type: 0.5% lidocaine with 1:200,000 epinephrine and a 1:10 solution of 8.4% sodium bicarbonate
Where Do You Want The Question To Include Opioid Counseling Located?: Case Summary Tab
Epidermal Autograft Text: The defect edges were debeveled with a #15 scalpel blade.  Given the location of the defect, shape of the defect and the proximity to free margins an epidermal autograft was deemed most appropriate.  Using a sterile surgical marker, the primary defect shape was transferred to the donor site. The epidermal graft was then harvested.  The skin graft was then placed in the primary defect and oriented appropriately.
Full Thickness Lip Wedge Repair (Flap) Text: Given the location of the defect and the proximity to free margins a full thickness wedge repair was deemed most appropriate.  Using a sterile surgical marker, the appropriate repair was drawn incorporating the defect and placing the expected incisions perpendicular to the vermillion border.  The vermillion border was also meticulously outlined to ensure appropriate reapproximation during the repair.  The area thus outlined was incised through and through with a #15 scalpel blade.  The muscularis and dermis were reaproximated with deep sutures following hemostasis. Care was taken to realign the vermillion border before proceeding with the superficial closure.  Once the vermillion was realigned the superfical and mucosal closure was finished.
Island Pedicle Flap Text: The defect edges were debeveled with a #15 scalpel blade.  Given the location of the defect, shape of the defect and the proximity to free margins an island pedicle advancement flap was deemed most appropriate.  Using a sterile surgical marker, an appropriate advancement flap was drawn incorporating the defect, outlining the appropriate donor tissue and placing the expected incisions within the relaxed skin tension lines where possible.    The area thus outlined was incised deep to adipose tissue with a #15 scalpel blade.  The skin margins were undermined to an appropriate distance in all directions around the primary defect and laterally outward around the island pedicle utilizing iris scissors.  There was minimal undermining beneath the pedicle flap.
Eye Protection Verbiage: Before proceeding with the stage, a plastic scleral shield was inserted. The globe was anesthetized with a few drops of 1% lidocaine with 1:100,000 epinephrine. Then, an appropriate sized scleral shield was chosen and coated with lacrilube ointment. The shield was gently inserted and left in place for the duration of each stage. After the stage was completed, the shield was gently removed.
Bilobed Flap Text: The defect edges were debeveled with a #15 scalpel blade.  Given the location of the defect and the proximity to free margins a bilobe flap was deemed most appropriate.  Using a sterile surgical marker, an appropriate bilobe flap drawn around the defect.    The area thus outlined was incised deep to adipose tissue with a #15 scalpel blade.  The skin margins were undermined to an appropriate distance in all directions utilizing iris scissors.
Purse String (Simple) Text: Given the location of the defect and the characteristics of the surrounding skin a pursestring closure was deemed most appropriate.  Undermining was performed circumfirentially around the surgical defect.  A purstring suture was then placed and tightened.
Detail Level: Detailed
Length To Time In Minutes Device Was In Place: 10
Consent (Scalp)/Introductory Paragraph: The rationale for Mohs was explained to the patient and consent was obtained. The risks, benefits and alternatives to therapy were discussed in detail. Specifically, the risks of changes in hair growth pattern secondary to repair, infection, scarring, bleeding, prolonged wound healing, incomplete removal, allergy to anesthesia, nerve injury and recurrence were addressed. Prior to the procedure, the treatment site was clearly identified and confirmed by the patient. All components of Universal Protocol/PAUSE Rule completed.
Cheiloplasty (Less Than 50%) Text: A decision was made to reconstruct the defect with a  cheiloplasty.  The defect was undermined extensively.  Additional obicularis oris muscle was excised with a 15 blade scalpel.  The defect was converted into a full thickness wedge, of less than 50% of the vertical height of the lip, to facilite a better cosmetic result.  Small vessels were then tied off with 5-0 monocyrl. The obicularis oris, superficial fascia, adipose and dermis were then reapproximated.  After the deeper layers were approximated the epidermis was reapproximated with particular care given to realign the vermillion border.
Wound Care (No Sutures): Petrolatum
Estimated Blood Loss (Cc): minimal
Consent (Temporal Branch)/Introductory Paragraph: The rationale for Mohs was explained to the patient and consent was obtained. The risks, benefits and alternatives to therapy were discussed in detail. Specifically, the risks of damage to the temporal branch of the facial nerve, infection, scarring, bleeding, prolonged wound healing, incomplete removal, allergy to anesthesia, and recurrence were addressed. Prior to the procedure, the treatment site was clearly identified and confirmed by the patient. All components of Universal Protocol/PAUSE Rule completed.
Vermilion Border Text: The closure involved the vermilion border.
Cartilage Graft Text: The defect edges were debeveled with a #15 scalpel blade.  Given the location of the defect, shape of the defect, the fact the defect involved a full thickness cartilage defect a cartilage graft was deemed most appropriate.  An appropriate donor site was identified, cleansed, and anesthetized. The cartilage graft was then harvested and transferred to the recipient site, oriented appropriately and then sutured into place.  The secondary defect was then repaired using a primary closure.
Tarsorrhaphy Text: A tarsorrhaphy was performed using Frost sutures.
Bcc Infiltrative Histology Text: There were numerous aggregates of basaloid cells demonstrating an infiltrative pattern.
Tissue Cultured Epidermal Autograft Text: The defect edges were debeveled with a #15 scalpel blade.  Given the location of the defect, shape of the defect and the proximity to free margins a tissue cultured epidermal autograft was deemed most appropriate.  The graft was then trimmed to fit the size of the defect.  The graft was then placed in the primary defect and oriented appropriately.
W Plasty Text: The lesion was extirpated to the level of the fat with a #15 scalpel blade.  Given the location of the defect, shape of the defect and the proximity to free margins a W-plasty was deemed most appropriate for repair.  Using a sterile surgical marker, the appropriate transposition arms of the W-plasty were drawn incorporating the defect and placing the expected incisions within the relaxed skin tension lines where possible.    The area thus outlined was incised deep to adipose tissue with a #15 scalpel blade.  The skin margins were undermined to an appropriate distance in all directions utilizing iris scissors.  The opposing transposition arms were then transposed into place in opposite direction and anchored with interrupted buried subcutaneous sutures.
No Repair - Repaired With Adjacent Surgical Defect Text (Leave Blank If You Do Not Want): After obtaining clear surgical margins the defect was repaired concurrently with another surgical defect which was in close approximation.
Helical Rim Text: The closure involved the helical rim.
Spiral Flap Text: The defect edges were debeveled with a #15 scalpel blade.  Given the location of the defect, shape of the defect and the proximity to free margins a spiral flap was deemed most appropriate.  Using a sterile surgical marker, an appropriate rotation flap was drawn incorporating the defect and placing the expected incisions within the relaxed skin tension lines where possible. The area thus outlined was incised deep to adipose tissue with a #15 scalpel blade.  The skin margins were undermined to an appropriate distance in all directions utilizing iris scissors.
H Plasty Text: Given the location of the defect, shape of the defect and the proximity to free margins a H-plasty was deemed most appropriate for repair.  Using a sterile surgical marker, the appropriate advancement arms of the H-plasty were drawn incorporating the defect and placing the expected incisions within the relaxed skin tension lines where possible. The area thus outlined was incised deep to adipose tissue with a #15 scalpel blade. The skin margins were undermined to an appropriate distance in all directions utilizing iris scissors.  The opposing advancement arms were then advanced into place in opposite direction and anchored with interrupted buried subcutaneous sutures.
Mohs Case Number: [id number]
Stage 2: Additional Anesthesia Type: 1% lidocaine with 1:100,000 epinephrine
Consent 2/Introductory Paragraph: Mohs surgery was explained to the patient and consent was obtained. The risks, benefits and alternatives to therapy were discussed in detail. Specifically, the risks of infection, scarring, bleeding, prolonged wound healing, incomplete removal, allergy to anesthesia, nerve injury and recurrence were addressed. Prior to the procedure, the treatment site was clearly identified and confirmed by the patient. All components of Universal Protocol/PAUSE Rule completed.
Location Indication Override (Is Already Calculated Based On Selected Body Location): Area H
Consent (Nose)/Introductory Paragraph: The rationale for Mohs was explained to the patient and consent was obtained. The risks, benefits and alternatives to therapy were discussed in detail. Specifically, the risks of nasal deformity, changes in the flow of air through the nose, infection, scarring, bleeding, prolonged wound healing, incomplete removal, allergy to anesthesia, nerve injury and recurrence were addressed. Prior to the procedure, the treatment site was clearly identified and confirmed by the patient. All components of Universal Protocol/PAUSE Rule completed.
O-T Advancement Flap Text: The defect edges were debeveled with a #15 scalpel blade.  Given the location of the defect, shape of the defect and the proximity to free margins an O-T advancement flap was deemed most appropriate.  Using a sterile surgical marker, an appropriate advancement flap was drawn incorporating the defect and placing the expected incisions within the relaxed skin tension lines where possible.    The area thus outlined was incised deep to adipose tissue with a #15 scalpel blade.  The skin margins were undermined to an appropriate distance in all directions utilizing iris scissors.
O-T Plasty Text: The defect edges were debeveled with a #15 scalpel blade.  Given the location of the defect, shape of the defect and the proximity to free margins an O-T plasty was deemed most appropriate.  Using a sterile surgical marker, an appropriate O-T plasty was drawn incorporating the defect and placing the expected incisions within the relaxed skin tension lines where possible.    The area thus outlined was incised deep to adipose tissue with a #15 scalpel blade.  The skin margins were undermined to an appropriate distance in all directions utilizing iris scissors.
Mercedes Flap Text: The defect edges were debeveled with a #15 scalpel blade.  Given the location of the defect, shape of the defect and the proximity to free margins a Mercedes flap was deemed most appropriate.  Using a sterile surgical marker, an appropriate advancement flap was drawn incorporating the defect and placing the expected incisions within the relaxed skin tension lines where possible. The area thus outlined was incised deep to adipose tissue with a #15 scalpel blade.  The skin margins were undermined to an appropriate distance in all directions utilizing iris scissors.
Tumor Debulked?: curette
Simple / Intermediate / Complex Repair - Final Wound Length In Cm: 2.2
Mart-1 - Negative Histology Text: MART-1 staining demonstrates a normal density and pattern of melanocytes along the dermal-epidermal junction. The surgical margins are negative for tumor cells.
A-T Advancement Flap Text: The defect edges were debeveled with a #15 scalpel blade.  Given the location of the defect, shape of the defect and the proximity to free margins an A-T advancement flap was deemed most appropriate.  Using a sterile surgical marker, an appropriate advancement flap was drawn incorporating the defect and placing the expected incisions within the relaxed skin tension lines where possible.    The area thus outlined was incised deep to adipose tissue with a #15 scalpel blade.  The skin margins were undermined to an appropriate distance in all directions utilizing iris scissors.
Mauc Instructions: By selecting yes to the question below the MAUC number will be added into the note.  This will be calculated automatically based on the diagnosis chosen, the size entered, the body zone selected (H,M,L) and the specific indications you chose. You will also have the option to override the Mohs AUC if you disagree with the automatically calculated number and this option is found in the Case Summary tab.
Primary Defect Length In Cm (Final Defect Size - Required For Flaps/Grafts): 0.6
Paramedian Forehead Flap Text: A decision was made to reconstruct the defect utilizing an interpolation axial flap and a staged reconstruction.  A telfa template was made of the defect.  This telfa template was then used to outline the paramedian forehead pedicle flap.  The donor area for the pedicle flap was then injected with anesthesia.  The flap was excised through the skin and subcutaneous tissue down to the layer of the underlying musculature.  The pedicle flap was carefully excised within this deep plane to maintain its blood supply.  The edges of the donor site were undermined.   The donor site was closed in a primary fashion.  The pedicle was then rotated into position and sutured.  Once the tube was sutured into place, adequate blood supply was confirmed with blanching and refill.  The pedicle was then wrapped with xeroform gauze and dressed appropriately with a telfa and gauze bandage to ensure continued blood supply and protect the attached pedicle.
Epidermal Closure: running and interrupted

## 2020-04-15 ENCOUNTER — APPOINTMENT (RX ONLY)
Dept: URBAN - METROPOLITAN AREA CLINIC 23 | Facility: CLINIC | Age: 75
Setting detail: DERMATOLOGY
End: 2020-04-15

## 2020-04-15 DIAGNOSIS — Z48.01 ENCOUNTER FOR CHANGE OR REMOVAL OF SURGICAL WOUND DRESSING: ICD-10-CM

## 2020-04-15 PROBLEM — L85.3 XEROSIS CUTIS: Status: ACTIVE | Noted: 2020-04-15

## 2020-04-15 PROCEDURE — ? SUTURE REMOVAL (GLOBAL PERIOD)

## 2020-04-15 ASSESSMENT — LOCATION DETAILED DESCRIPTION DERM: LOCATION DETAILED: LEFT INFERIOR LATERAL FOREHEAD

## 2020-04-15 ASSESSMENT — LOCATION ZONE DERM: LOCATION ZONE: FACE

## 2020-04-15 ASSESSMENT — LOCATION SIMPLE DESCRIPTION DERM: LOCATION SIMPLE: LEFT FOREHEAD

## 2020-04-15 NOTE — PROCEDURE: SUTURE REMOVAL (GLOBAL PERIOD)
Add 65814 Cpt? (Important Note: In 2017 The Use Of 49108 Is Being Tracked By Cms To Determine Future Global Period Reimbursement For Global Periods): no
Detail Level: Simple

## 2020-06-22 ENCOUNTER — HOSPITAL ENCOUNTER (OUTPATIENT)
Dept: SLEEP MEDICINE | Age: 75
Discharge: HOME | End: 2020-06-22
Payer: MEDICARE

## 2020-06-22 PROCEDURE — 95806 SLEEP STUDY UNATT&RESP EFFT: CPT

## 2021-02-24 ENCOUNTER — APPOINTMENT (RX ONLY)
Dept: URBAN - METROPOLITAN AREA CLINIC 23 | Facility: CLINIC | Age: 76
Setting detail: DERMATOLOGY
End: 2021-02-24

## 2021-02-24 DIAGNOSIS — D22 MELANOCYTIC NEVI: ICD-10-CM

## 2021-02-24 DIAGNOSIS — L81.4 OTHER MELANIN HYPERPIGMENTATION: ICD-10-CM

## 2021-02-24 DIAGNOSIS — L82.1 OTHER SEBORRHEIC KERATOSIS: ICD-10-CM

## 2021-02-24 DIAGNOSIS — Z85.828 PERSONAL HISTORY OF OTHER MALIGNANT NEOPLASM OF SKIN: ICD-10-CM

## 2021-02-24 PROBLEM — M12.9 ARTHROPATHY, UNSPECIFIED: Status: ACTIVE | Noted: 2021-02-24

## 2021-02-24 PROBLEM — D22.5 MELANOCYTIC NEVI OF TRUNK: Status: ACTIVE | Noted: 2021-02-24

## 2021-02-24 PROCEDURE — 11305 SHAVE SKIN LESION 0.5 CM/<: CPT

## 2021-02-24 PROCEDURE — ? COUNSELING

## 2021-02-24 PROCEDURE — ? OTHER

## 2021-02-24 PROCEDURE — 99213 OFFICE O/P EST LOW 20 MIN: CPT | Mod: 25

## 2021-02-24 PROCEDURE — ? SHAVE REMOVAL

## 2021-02-24 ASSESSMENT — LOCATION SIMPLE DESCRIPTION DERM
LOCATION SIMPLE: LEFT FOREHEAD
LOCATION SIMPLE: RIGHT ANTERIOR NECK
LOCATION SIMPLE: CHEST
LOCATION SIMPLE: LEFT PRETIBIAL REGION
LOCATION SIMPLE: RIGHT UPPER BACK
LOCATION SIMPLE: LEFT CHEEK
LOCATION SIMPLE: LEFT TEMPLE

## 2021-02-24 ASSESSMENT — LOCATION ZONE DERM
LOCATION ZONE: TRUNK
LOCATION ZONE: NECK
LOCATION ZONE: FACE
LOCATION ZONE: LEG

## 2021-02-24 ASSESSMENT — LOCATION DETAILED DESCRIPTION DERM
LOCATION DETAILED: LEFT LATERAL SUPERIOR CHEST
LOCATION DETAILED: LEFT DISTAL PRETIBIAL REGION
LOCATION DETAILED: LEFT SUPERIOR NASAL CHEEK
LOCATION DETAILED: RIGHT MEDIAL UPPER BACK
LOCATION DETAILED: LEFT LATERAL TEMPLE
LOCATION DETAILED: LEFT INFERIOR LATERAL FOREHEAD
LOCATION DETAILED: RIGHT INFERIOR LATERAL NECK

## 2021-02-24 NOTE — PROCEDURE: OTHER
Detail Level: Detailed
Other (Free Text): Ln2
Render Risk Assessment In Note?: no
Note Text (......Xxx Chief Complaint.): This diagnosis correlates with the

## 2021-02-24 NOTE — PROCEDURE: SHAVE REMOVAL
Medical Necessity Clause: This procedure was medically necessary because the lesion that was treated was: irritant
Path Notes (To The Dermatopathologist): Please check margins.
X Size Of Lesion In Cm (Optional): 0
Hemostasis: Electrocautery
Was A Bandage Applied: Yes
Accession #: [id number]
Medical Necessity Information: It is in your best interest to select a reason for this procedure from the list below. All of these items fulfill various CMS LCD requirements except the new and changing color options.
Bill For Surgical Tray: no
Consent was obtained from the patient. The risks and benefits to therapy were discussed in detail. Specifically, the risks of infection, scarring, bleeding, prolonged wound healing, incomplete removal, allergy to anesthesia, nerve injury and recurrence were addressed. Prior to the procedure, the treatment site was clearly identified and confirmed by the patient. All components of Universal Protocol/PAUSE Rule completed.
Post-Care Instructions: I reviewed with the patient in detail post-care instructions. Patient is to keep the biopsy site dry overnight, and then apply bacitracin twice daily until healed. Patient may apply hydrogen peroxide soaks to remove any crusting.
Detail Level: Detailed
Biopsy Method: Dermablade
Notification Instructions: Patient will be notified of biopsy results. However, patient instructed to call the office if not contacted within 2 weeks.
Anesthesia Volume In Cc: 0.5
Billing Type: Third-Party Bill
Outside Pathology Billing: outside pathology read only
Anesthesia Type: 1% lidocaine without epinephrine
Wound Care: Vaseline

## 2021-05-17 PROBLEM — Z99.89 OSA ON CPAP: Status: ACTIVE | Noted: 2021-05-17

## 2021-05-17 PROBLEM — G47.33 OSA ON CPAP: Status: ACTIVE | Noted: 2021-05-17

## 2021-09-15 ENCOUNTER — HOSPITAL ENCOUNTER (OUTPATIENT)
Dept: LAB | Age: 76
Discharge: HOME | End: 2021-09-15

## 2021-09-15 PROCEDURE — 88305 TISSUE EXAM BY PATHOLOGIST: CPT

## 2022-02-23 ENCOUNTER — APPOINTMENT (RX ONLY)
Dept: URBAN - METROPOLITAN AREA CLINIC 23 | Facility: CLINIC | Age: 77
Setting detail: DERMATOLOGY
End: 2022-02-23

## 2022-02-23 DIAGNOSIS — L50.8 OTHER URTICARIA: ICD-10-CM

## 2022-02-23 DIAGNOSIS — D22 MELANOCYTIC NEVI: ICD-10-CM

## 2022-02-23 DIAGNOSIS — L81.4 OTHER MELANIN HYPERPIGMENTATION: ICD-10-CM

## 2022-02-23 DIAGNOSIS — Z85.828 PERSONAL HISTORY OF OTHER MALIGNANT NEOPLASM OF SKIN: ICD-10-CM

## 2022-02-23 PROBLEM — D22.5 MELANOCYTIC NEVI OF TRUNK: Status: ACTIVE | Noted: 2022-02-23

## 2022-02-23 PROCEDURE — 99213 OFFICE O/P EST LOW 20 MIN: CPT | Mod: 25

## 2022-02-23 PROCEDURE — 11300 SHAVE SKIN LESION 0.5 CM/<: CPT

## 2022-02-23 PROCEDURE — ? COUNSELING

## 2022-02-23 PROCEDURE — ? OTHER

## 2022-02-23 PROCEDURE — ? SHAVE REMOVAL

## 2022-02-23 ASSESSMENT — LOCATION DETAILED DESCRIPTION DERM
LOCATION DETAILED: LEFT LATERAL UPPER BACK
LOCATION DETAILED: LEFT LATERAL TEMPLE
LOCATION DETAILED: LEFT DISTAL PRETIBIAL REGION
LOCATION DETAILED: LEFT PROXIMAL DORSAL FOREARM
LOCATION DETAILED: RIGHT MEDIAL UPPER BACK
LOCATION DETAILED: LEFT INFERIOR LATERAL FOREHEAD
LOCATION DETAILED: LEFT LATERAL SUPERIOR CHEST

## 2022-02-23 ASSESSMENT — LOCATION SIMPLE DESCRIPTION DERM
LOCATION SIMPLE: LEFT UPPER BACK
LOCATION SIMPLE: RIGHT UPPER BACK
LOCATION SIMPLE: LEFT FOREHEAD
LOCATION SIMPLE: CHEST
LOCATION SIMPLE: LEFT PRETIBIAL REGION
LOCATION SIMPLE: LEFT TEMPLE
LOCATION SIMPLE: LEFT FOREARM

## 2022-02-23 ASSESSMENT — LOCATION ZONE DERM
LOCATION ZONE: FACE
LOCATION ZONE: TRUNK
LOCATION ZONE: ARM
LOCATION ZONE: LEG

## 2022-02-23 NOTE — PROCEDURE: OTHER
Render Risk Assessment In Note?: no
Note Text (......Xxx Chief Complaint.): This diagnosis correlates with the
Detail Level: Zone
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 Advised patient to have her allergist research the medication to make sure it has no effect on her bladder, if not , this will be a great option as treatment for the chronic hives.

## 2022-02-23 NOTE — PROCEDURE: SHAVE REMOVAL
Bill 98846 For Specimen Handling/Conveyance To Laboratory?: no
Post-Care Instructions: I reviewed with the patient in detail post-care instructions. Patient is to keep the biopsy site dry overnight, and then apply bacitracin twice daily until healed. Patient may apply hydrogen peroxide soaks to remove any crusting.
Hemostasis: Aluminum Chloride
Consent was obtained from the patient. The risks and benefits to therapy were discussed in detail. Specifically, the risks of infection, scarring, bleeding, prolonged wound healing, incomplete removal, allergy to anesthesia, nerve injury and recurrence were addressed. Prior to the procedure, the treatment site was clearly identified and confirmed by the patient. All components of Universal Protocol/PAUSE Rule completed.
Accession #: [id number]
Medical Necessity Information: It is in your best interest to select a reason for this procedure from the list below. All of these items fulfill various CMS LCD requirements except the new and changing color options.
Biopsy Method: Dermablade
Was A Bandage Applied: Yes
Detail Level: Detailed
Billing Type: Third-Party Bill
Wound Care: Petrolatum
Notification Instructions: Patient will be notified of pathology results. However, patient instructed to call the office if not contacted within 2 weeks.
Size Of Lesion In Cm (Required): 0.3
Medical Necessity Clause: This procedure was medically necessary because the lesion that was treated was:irritated
Anesthesia Type: 1% lidocaine with epinephrine
X Size Of Lesion In Cm (Optional): 0

## 2022-02-23 NOTE — PROCEDURE: MIPS QUALITY
Quality 110: Preventive Care And Screening: Influenza Immunization: Influenza Immunization previously received during influenza season
Detail Level: Simple
Quality 130: Documentation Of Current Medications In The Medical Record: Current Medications Documented
Quality 111:Pneumonia Vaccination Status For Older Adults: Pneumococcal Vaccination Previously Received

## 2022-03-18 PROBLEM — G47.33 OSA ON CPAP: Status: ACTIVE | Noted: 2021-05-17

## 2022-03-18 PROBLEM — Z99.89 OSA ON CPAP: Status: ACTIVE | Noted: 2021-05-17

## 2022-03-18 PROBLEM — N30.10 INTERSTITIAL CYSTITIS: Status: ACTIVE | Noted: 2019-11-11

## 2022-03-19 PROBLEM — E78.00 PURE HYPERCHOLESTEROLEMIA: Status: ACTIVE | Noted: 2019-02-21

## 2022-03-20 PROBLEM — R03.0 ELEVATED BLOOD PRESSURE READING WITHOUT DIAGNOSIS OF HYPERTENSION: Status: ACTIVE | Noted: 2019-02-21

## 2022-05-04 ENCOUNTER — HOSPITAL ENCOUNTER (OUTPATIENT)
Dept: PHYSICAL THERAPY | Age: 77
Discharge: HOME | End: 2022-05-04
Attending: OBSTETRICS & GYNECOLOGY

## 2022-05-04 NOTE — PROGRESS NOTES
Genet Fairbanks  : 1945  Primary: [employer] [place] [employer]  Secondary:  [facility] [place] [address], [address], [place] [address]  [phone]   [phone]        OUTPATIENT DAILY NOTE    NAME/AGE/GENDER: Janet Motley is a 78 y.o. female. DATE: 2022    Ms. Fairbanks CANCELED for today's appointment due to unknown reasons.     J Carlos Tai, PT, DPT

## 2022-05-17 PROBLEM — G47.34 NOCTURNAL HYPOXEMIA: Status: ACTIVE | Noted: 2022-05-17

## 2022-05-21 PROBLEM — M81.0 OSTEOPOROSIS WITHOUT CURRENT PATHOLOGICAL FRACTURE: Status: ACTIVE | Noted: 2022-05-21

## 2022-06-03 DIAGNOSIS — G47.00 INSOMNIA, UNSPECIFIED TYPE: Primary | ICD-10-CM

## 2022-06-03 RX ORDER — ESZOPICLONE 3 MG/1
3 TABLET, FILM COATED ORAL NIGHTLY
Qty: 30 TABLET | Refills: 5 | Status: SHIPPED | OUTPATIENT
Start: 2022-06-03 | End: 2022-07-03

## 2022-06-29 ENCOUNTER — HOSPITAL ENCOUNTER (OUTPATIENT)
Dept: PHYSICAL THERAPY | Age: 77
Setting detail: RECURRING SERIES
Discharge: HOME | End: 2022-07-02
Payer: MEDICARE

## 2022-06-29 PROCEDURE — 97161 PT EVAL LOW COMPLEX 20 MIN: CPT

## 2022-06-29 ASSESSMENT — PAIN SCALES - GENERAL: PAINLEVEL_OUTOF10: 0

## 2022-06-29 NOTE — THERAPY EVALUATION
Tish Hugo  : 1945  Primary: [facility] Medicare  Secondary:  [employer]  [address] [facility] [address]  Phone: [phone]  Fax: [phone] Plan Frequency: 1x/week for 90 days    Plan of Care/Certification Expiration Date: 22      PT Visit Info:    No data recorded    OUTPATIENT PHYSICAL THERAPY:OP NOTE TYPE: Initial Assessment 2022               Episode  Appt Desk         Treatment Diagnosis: Lack of coordination (muscle incoordination) (R27.8)  Pelvic floor dysfunction in female (M62.98)  Frequency of micturition (R35.0)  Hesitancy of micturition (R39.11)  Nocturia (R35.1)  Feeling of incomplete bladder emptying (R39.14)  Interstitial cystitis (chronic) (N30.1)  Bladder pain (R39.89)  Pelvic and perineal pain (R10.2)  Medical/Referring Diagnosis:  PFD (pelvic floor dysfunction) [W68.78]  Referring Physician:  Dashawn Calle MD Orders:  PT Eval and Treat   Return MD Appt: 22   Date of Onset:  No data recorded   Allergies:  Latex and Nickel  Restrictions/Precautions:    Restrictions/Precautions: None  No data recorded      cyclobenzaprine (FLEXERIL) 10 mg tablet 10 mg by Oral/Gastric Tube route    eszopiclone (Lunesta) 3 mg tablet Take 3 mg by mouth   Medications Last Reviewed:  2022     SUBJECTIVE   History of Injury/Illness (Reason for Referral):  Ms. Sarah Hyde is a 77 yo female referred to pelvic floor physical therapy (PFPT) by Dashawn Calle,* 2/2 PFD (pelvic floor dysfunction) [M62.89] . Retired in . 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 Pushing/pulling a lot. Started noticing burning sensation after her mother's passing. Saw OBGYN was told that she had a prolapse. Late  woke up and was unable to urinate, intense burning and pressure. She was finally able to urinate and pain would go away. Saw urolisetn in late  early . Dx w/ IC. Took Uribel and [employer]. Pain continued to get worse so dicontinued.  Requested referral to PT however states that she was not able to get this. Tried Weebly, pumpkin seed oil, d-mannose, builder build, bladder ease for 4-5 months. States that she finds the most relief w/ heating pad and baking soda water. End up getting another opinion with Dr. Debra Mills office. Went to PT w/ Sunny Barcenas for two sessions in the fall (2021) but had to discontinue due to cost and 's medical care. Recently put on CPAP due to not sleeping well. About 1 month ago had O2 test. Discontinued due to \"not needing it\". Started on Lunesta. Wakes up after 1 hour and then will have to go to the bathroom. Since taking the Lunesta is able to get back to sleep more easily after waking. Urinary: Frequency 8-10 x/day, 5 x/night. Positive for urinary frequency, incomplete emptying, urinary hesitancy/intermittency, dysuria and nocturia. Negative for stress urinary incontinence, urge urinary incontinence, urinary urgency, hematuria and nocturnal enuresis. Pt does not use pads for urinary protection; 0 pads per day (PPD). Fluid intake: 36 oz water/day; bladder irritants include: 2c milk, 1c marshmallow root tea. Pt does not limit fluid intake due to bladder control. Bowel: Frequency 1x/day. Positive for pain with bowel movement and constipation. Negative for pushing/straining with bowel movement, fecal incontinence and incomplete emptying. Pt does not use pads for bowel protection; 0 pads per day (PPD). [place] stool type: 4. Use of stool softeners or laxatives? Has tried Miralax for 1 week but did not find this to be helpful    Sexual: Pt is not sexually active due to pain. Contraception/birth control: hysterectomy (parital). History of sexual abuse: No    Pelvic Organ Prolapse/Pelvic Pain: Location: urethral pain. Worse with sitting, dietary/bladder irritants- citrus, spices, caffeine, house work, yard work due to bending over. Relieved with heating pad, baking soda water.  Max pain 10/10. Min pain 0/10. Avg pain 8/10. Pain frequency is 5/7 days of the week. OB History: Number of pregnancies: 1 Number of vaginal deliveries: 1 Number of c-sections: 0. Patient Stated Goal(s):  \"to get my life back, if possible\"  Initial:     0/10 Post Session:     0/10  Past Medical History/Comorbidities:   Ms. Brad Whitehead  has a past medical history of Arthritis, Depressive disorder, not elsewhere classified, GERD (gastroesophageal reflux disease), Insomnia, unspecified, Unspecified adverse effect of anesthesia, Unspecified essential hypertension, and Vaginal prolapse. Ms. Brad Whitehead  has a past surgical history that includes Colonoscopy (10/12/2016); lipoma resection (Left); Colonoscopy (2021); orthopedic surgery (Right); Tonsillectomy (age 27); and Hysterectomy, total abdominal (20 yrs ago). Social History/Living Environment:   Lives With: Spouse  Type of Home: House  Home Layout: One level     Prior Level of Function/Work/Activity:   Prior level of function: Independent  Occupation: Retired  Leisure & Hobbies: [place] [employer] but this make her pain worse so she has not been doing this much recently  No data recordedNo data recorded   Learning:   Does the patient/guardian have any barriers to learning?: No barriers  Will there be a co-learner?: No  What is the preferred language of the patient/guardian?: English  Is an  required?: No  How does the patient/guardian prefer to learn new concepts?: Listening; Reading; Demonstration; Pictures/Videos     Fall Risk Scale: Total Score: 10  Peña Fall Risk: Low (0-24)         OBJECTIVE   Pelvic Floor:  Pelvic Floor External Observations:  Skin Condition:  (white creamy discharge at introitus)  Sensation: Intact to light touch  Palpation: No point tenderness  Tone: Hypertonic  External Observations:   Postural assessment:  Forward Head Posture, Increased Thoracic Kyphosis and Rounded Shoulders    Breath assessment:  Observation: A/P chest expansion and lateral rib expansion  Coordination of pelvic floor muscles with breath: minimal pelvic floor muscle excursion    Pelvic Floor Internal Exam:  Type of exam Performed: Internal Vaginal exam  Sensation: Intact  Vaginal Vault Size: WNL  Tone: Vaginal,Hypertonic  Power: 1/5  Elevation: Present  Co-contraction: Weak    Palpation: via vaginal canal ; tenderness improve w/ SCS; moderate tension  Superficial Pelvic Floor Musculature (PFM): Tender? Intermediate PFM Tender? Deep PFM Tender? Superficial Transverse Perineal [x] Right  [x] Left  []DNT Deep Transverse Perineal [] Right  [] Left  []DNT Puborectalis [] Right  [] Left  []DNT   Ischiocavernosus [x] Right  [] Left  []DNT Compressor Urethra [] Right  [] Left  []DNT Pubococcygeus [] Right  [] Left  []DNT   Bulbocavernosus [] Right  [] Left  []DNT   Iliococcygeus [x] Right  [x] Left  []DNT       Obturator Internus [] Right  [] Left  [x]DNT       Coccygeus [] Right  [] Left  [x]DNT     Perineal Body Mobility:  Voluntary Contraction: Absent  Voluntary Relaxation: Absent  Involuntary Contraction: Present  Involuntary Relaxation: Present    Perineal Body Descent:  Resting: Absent  Bearing down: Absent    ASSESSMENT   Initial Assessment: Kailey Hugo presents with musculoskeletal limitations including pelvic floor muscle (PFM) tension, reduced PFM Range of Motion (ROM), increased tenderness to palpation of the PFM, poor posture, reduced coordination and awareness of PFM, abdominal soft tissue restrictions, reduced hip strength and poor diaphragm excursion. These limitations are impairing the patient's ability to properly coordinate perineal elevation and descent for normalized PFM function, contributing to urinary dysfunction and voiding dysfunction.  As a result, she is limited in her/his ability to participate in household chores, physical activities such as walking, swimming, or other exercise, entertainment activities such as going to a movie or concert, traveling by car or bus for a distance greater then 30 minutes away from home and social activities outside of the home. Problem List: (Impacting functional limitations): Body Structures, Functions, Activity Limitations Requiring Skilled Therapeutic Intervention: Decreased ADL status; Decreased ROM; Decreased strength; Decreased coordination; Increased pain; Decreased posture     Therapy Prognosis:   Therapy Prognosis: Good     Assessment Complexity:   Low Complexity  PLAN   Effective Dates: 6/29/22 TO Plan of Care/Certification Expiration Date: 09/27/22     Frequency/Duration: Plan Frequency: 1x/week for 90 days     Interventions Planned (Treatment may consist of any combination of the following):    Current Treatment Recommendations: Strengthening; ROM; ADL/Self-care training; Neuromuscular re-education; Manual Therapy - Soft Tissue Mobilization; Pain management; Home exercise program; Patient/Caregiver education & training; Modalities; Positioning; Therapeutic activities     Goals: (Goals have been discussed and agreed upon with patient.)  Short-Term Functional Goals: Time Frame: 6 weeks  1. Pt will demonstrate I with basic PFM HEP to improve awareness, coordination, and timing of PFM. 2. Patient will demonstrate understanding of and ability to teach back appropriate water intake, bladder irritants, toileting frequency, and positioning for improved self-management of symptoms. 3. Patient will demonstrate ability to perform diaphragmatic breathing in multiple positions to assist in pelvic floor tension reduction. 4. Patient will verbalize understanding and demonstrate postural adjustments which facilitate lengthening and reduce overall pelvic floor muscle activity.    5. Patient will be able to identify triggers for muscle guarding and be able to teach back 3 exercises or strategies that may be performed daily to reduce pain or discomfort from 8 to 6.  6. Patient will engage in 15 minutes of physical activity per day in order to assist in stress management. Discharge Goals: Time Frame: 12 weeks  1. Patient will be independent with implementation of a timed voiding schedule and use of urge suppression to reduce urinary frequency to 8/day and <2/night. 2. Patient will improve outcome score by 20%. 3. Patient will demonstrate independence with a home exercise program for aerobic conditioning, core stabilization, and general mobility for independent management of symptoms. 4. Patient will demonstrate normal voluntary relaxation of the pelvic floor muscle group to improve pelvic floor ROM and improve pain management. Outcome Measure:   Pelvic Floor Impact Questionnaire--short form 7 (PFIQ-7):  Score:  Initial: 6/29/22  · Bladder or Urine: 76.19/100  · Bowel or Rectum: 0/100  · Vagina or Pelvis: 80.95/100 Most Recent: X (Date: -- )  · Bladder or Urine: X/100  · Bowel or Rectum: X/100  · Vagina or Pelvis: X/100   Interpretation of Score: Each of the 7 sections is scored on a scale from 0-3; 0 representing \"Not at all\", 3 representing \"Quite a bit\". The mean value is taken from all the answered items, then multiplied by 100 to obtain the scale score, ranging from 0-100. This process is repeated for each column representing bowel, bladder, and pelvic pain. Medical Necessity:    Patient is expected to demonstrate progress in range of motion, coordination and functional technique to decreased pain and improve QOL. Reason For Services/Other Comments:   Patient continues to require skilled intervention due to above mentioned deficits. Total Duration:  Time In: 0900  Time Out: 1010    Regarding Winter Hugo's therapy, I certify that the treatment plan above will be carried out by a therapist or under their direction.   Thank you for this referral,  Balbir Carson PT     Referring Physician Signature: Minoo Bejarano,* _______________________________ Date _____________        Post Session Pain

## 2022-06-29 NOTE — PROGRESS NOTES
Veto Friday Sweet  : 1945  Primary: [employer] Medicare  Secondary:  [employer]  [address] Travis R[address],[facility] Floor [address]  Phone: [phone]  Fax: [phone] Plan Frequency: 1x/week for 90 days    Plan of Care/Certification Expiration Date: 22      PT Visit Info:   No data recorded    OUTPATIENT PHYSICAL THERAPY:OP NOTE TYPE: Treatment Note 2022       Episode  }Appt Desk              Treatment Diagnosis: Lack of coordination (muscle incoordination) (R27.8)  Pelvic floor dysfunction in female (M62.98)  Frequency of micturition (R35.0)  Hesitancy of micturition (R39.11)  Nocturia (R35.1)  Feeling of incomplete bladder emptying (R39.14)  Interstitial cystitis (chronic) (N30.1)  Bladder pain (R39.89)  Pelvic and perineal pain (R10.2)  Medical/Referring Diagnosis:  PFD (pelvic floor dysfunction) [Y51.11]  Referring Physician:  Caroline Reyes MD Orders:  PT Eval and Treat  Return MD Appt:  22  Date of Onset:  No data recorded   Allergies:   Latex and Nickel  Restrictions/Precautions:  Restrictions/Precautions: None  No data recorded   Interventions Planned (Treatment may consist of any combination of the following):    Current Treatment Recommendations: Strengthening; ROM; ADL/Self-care training; Neuromuscular re-education; Manual Therapy - Soft Tissue Mobilization; Pain management; Home exercise program; Patient/Caregiver education & training; Modalities; Positioning; Therapeutic activities     Subjective Comments: IC/PBS, urethral pain with [id number]}     Initial:}    0/10Post Session:       0/10  Medications Last Reviewed:  2022  Updated Objective Findings:  See evaluation note from today  Treatment   THERAPEUTIC EXERCISE: (10 minutes):  Exercises per grid below to improve mobility and coordination. Required moderate visual, verbal and tactile cues to promote proper body breathing techniques and PFM relaxation and excursion.   Progressed range and repetitions as indicated. Date:  6/29/22 Date:   Date:     Activity/Exercise Parameters Parameters Parameters   PFM coordination PF drops/relaxation to improve ROM and tone     Diaphragmatic breathing To improve PFM excursion and ROM             Treatment/Session Summary:    · Treatment Assessment:  Carol Hugo presents with musculoskeletal limitations including pelvic floor muscle (PFM) tension, reduced PFM Range of Motion (ROM), increased tenderness to palpation of the PFM, poor posture, reduced coordination and awareness of PFM, abdominal soft tissue restrictions, reduced hip strength and poor diaphragm excursion. These limitations are impairing the patient's ability to properly coordinate perineal elevation and descent for normalized PFM function, contributing to urinary dysfunction and voiding dysfunction. As a result, she is limited in her/his ability to participate in household chores, physical activities such as walking, swimming, or other exercise, entertainment activities such as going to a movie or concert, traveling by car or bus for a distance greater then 30 minutes away from home and social activities outside of the home. · Communication/Consultation:  None today  · Equipment provided today:  None  · Recommendations/Intent for next treatment session: Next visit will focus on manual therapy, down training, toilet positioning.     Total Treatment Billable Duration:  Evaluation 70 minutes   Time In: 0900  Time Out: Jayjay 70, PT       Charge Capture  }Post Session Pain  PT Visit Info  [employer]  MD Guidelines  Scanned Media  Benefits  [employer]    Future Appointments   Date Time Provider Oly Tim   7/13/2022  9:00 AM Anna Hamilton, PT [facility]   7/20/2022  1:00 PM Anna Hamilton PT [facility]   7/27/2022  9:00 AM Anna Hamilton, PT [facility]   8/3/2022  9:00 AM Anna Hamilton, PT [facility]   8/10/2022  9:00 AM Anna Hamilton PT [facility]   8/17/2022  9:00 AM Anna Hamilton, PT [facility] 8/24/2022  9:00 AM Aaron Ureña, PT [facility]   8/31/2022  9:00 AM Aaron Ureña, PT [facility]   9/7/2022  8:20 AM Johann Hopkins MD [facility]   11/14/2022  8:20 AM Nicolette Andujar MD [facility]

## 2022-07-06 ENCOUNTER — HOSPITAL ENCOUNTER (OUTPATIENT)
Dept: PHYSICAL THERAPY | Age: 77
Setting detail: RECURRING SERIES
Discharge: HOME | End: 2022-07-09
Payer: MEDICARE

## 2022-07-06 PROCEDURE — 97110 THERAPEUTIC EXERCISES: CPT

## 2022-07-06 PROCEDURE — 97530 THERAPEUTIC ACTIVITIES: CPT

## 2022-07-06 PROCEDURE — 97140 MANUAL THERAPY 1/> REGIONS: CPT

## 2022-07-06 ASSESSMENT — PAIN SCALES - GENERAL: PAINLEVEL_OUTOF10: 0

## 2022-07-06 NOTE — PROGRESS NOTES
Winter Hugo  : 1945  Primary: Jessica Sky Medicare  Secondary:  [facility]  [address]  [address]  [address] [place] [address]  Phone: [phone]  Fax: [phone] Plan Frequency: 1x/week for 90 days    Plan of Care/Certification Expiration Date: 22      PT Visit Info:   No data recorded    OUTPATIENT PHYSICAL THERAPY:OP NOTE TYPE: Treatment Note 2022       Episode  }Appt Desk              Treatment Diagnosis: Lack of coordination (muscle incoordination) (R27.8)  Pelvic floor dysfunction in female (M62.98)  Frequency of micturition (R35.0)  Hesitancy of micturition (R39.11)  Nocturia (R35.1)  Feeling of incomplete bladder emptying (R39.14)  Interstitial cystitis (chronic) (N30.1)  Bladder pain (R39.89)  Pelvic and perineal pain (R10.2)  Medical/Referring Diagnosis:  PFD (pelvic floor dysfunction) [V42.24]  Referring Physician:  Anabela Caldera MD Orders:  PT Eval and Treat  Return MD Appt:  22  Date of Onset:  No data recorded   Allergies:   Latex and Nickel  Restrictions/Precautions:  Restrictions/Precautions: None  No data recorded   Interventions Planned (Treatment may consist of any combination of the following):    Current Treatment Recommendations: Strengthening; ROM; ADL/Self-care training; Neuromuscular re-education; Manual Therapy - Soft Tissue Mobilization; Pain management; Home exercise program; Patient/Caregiver education & training; Modalities; Positioning; Therapeutic activities     Subjective Comments: IC/PBS, urethral pain with [id number]}  Doing very well with the breathing exercise. She has been doing the drops, but has a fear of activating the muscles due to pain. She has done well since our evaluation. She had 2 \"bad days\", but otherwise she has done well. One episode of urethral bruning over this last week.   Initial:}    010Post Session:       010  Medications Last Reviewed:  2022  Updated Objective Findings:  mild TTP R compressor urethra and L levator ani, improved as compared to evaluation  Treatment   THERAPEUTIC EXERCISE: (15 minutes):  Exercises per grid below to improve mobility and coordination. Required moderate visual, verbal and tactile cues to promote proper body breathing techniques and PFM relaxation and excursion. Progressed range and repetitions as indicated. Date:  6/29/22 Date:  7/6/22 Date:     Activity/Exercise Parameters Parameters Parameters   PFM coordination PF drops/relaxation to improve ROM and tone W/ MT to improve relaxation, ROM and tone    Diaphragmatic breathing To improve PFM excursion and ROM W/ MT to improve PFM excursion and ROM    Adductor stretch  3 minutes      THERAPEUTIC ACTIVITY: ( 15 minutes): Patient instructed in use of various voiding techniques including double voiding and splinting (perineal support and intravaginal support) to improve bladder emptying. and Functional activity training to improve toilet positioning and pelvic floor muscle relaxation, to increase anorectal angle in order to improve bowel/bladder emptying and minimize straining/paradoxical PFM activation during defecation. TA Educational Topic Notes Date Completed   Pathology/Anatomy/PFM Function     Bladder health education     Urinary urge suppression     The knack     Voiding strategies  7/6/22   Nocturia tips     Bowel/Bladder log     Bowel health education     Constipation care     Diarrhea/Fecal leakage     Colonic massage     Toilet positioning  7/6/22   Defecation dynamics     Sources of fiber     Return to intercourse/Dilator training     Sexual positioning     Lubricants/vaginal moisturizers     Vaginal irritants     Body mechanics     Posture/ergonomics     Diaphragmatic breathing     Resources and technology       MANUAL THERAPY: (25 minutes): Soft tissue mobilization was utilized and necessary because of the patient's painful spasm and restricted motion of soft tissue.      Date Type Location Comments   7/6/2022 Internal assessment/treatment Via vaginal canal Single digit MT to all PFM layer to improve length, tenderness and relaxation    STM adductors Skin rolling                                 (Used abbreviations: MET - muscle energy technique; SCS- Strain counter strain; CTM-Connective tissue mobilizations; CR- Contract/Relax; SP- Sustained pressure; PIT- Positional inhibition techniques; STM Soft -tissue mobilization; MM- Myofascial mobilization; TrP-Trigger point release; IASTM- Instrument assisted soft tissue mobilizations, TDN-Trigger point dry needling)    Pt gives verbal consent to internal vaginal assessment/treatment without chaperon present. Treatment/Session Summary:    · Treatment Assessment:  Pt demonstrate improved relaxation of PFM today with overall decreased tenderness with manual palpation via vaginal canal. Tenderness present does resolve well with SCS methods. Pt is demonstrate prescribed exercises well. Tolerated adductor stretch well, added to HEP to encourage relaxation and lengthening. Additionally, pt was educated extensively today in toilet positioning and voiding techniques to improve bowel/bladder emptying and to minimize straing in order to reduce pain and pelvoc organ support. · Communication/Consultation:  None today  · Equipment provided today:  None  · Recommendations/Intent for next treatment session: Next visit will focus on manual therapy, down training, checkin- toilet positioning.     Total Treatment Billable Duration: 15 minutes TE, 15 minutes TA, 25 minutes MT  Time In: 0900  Time Out: [address] [facility], PT       Charge Capture  }Post Session Pain  PT Visit Info  [employer] Portal  MD Guidelines  Scanned Media  Benefits  MyChart    Future Appointments   Date Time Provider Oly Tim   7/13/2022  9:00 AM Chen Smith PT [facility]   7/20/2022  1:00 PM Chen Smith PT [facility]   7/27/2022  9:00 AM BRIAN Darling   8/3/2022  9:00 AM BRIAN Darling 8/10/2022  9:00 AM Murtaza Bates, PT [facility]   8/17/2022  9:00 AM Murtaza Bates, PT [facility]   8/24/2022  9:00 AM Murtaza Bates, PT [facility]   9/7/2022  8:20 AM MD REJI Casillas [facility]   11/14/2022  8:20 AM Yumi Hernandez MD [facility]

## 2022-07-13 ENCOUNTER — HOSPITAL ENCOUNTER (OUTPATIENT)
Dept: PHYSICAL THERAPY | Age: 77
Setting detail: RECURRING SERIES
Discharge: HOME | End: 2022-07-16
Payer: MEDICARE

## 2022-07-13 PROCEDURE — 97530 THERAPEUTIC ACTIVITIES: CPT

## 2022-07-13 PROCEDURE — 97110 THERAPEUTIC EXERCISES: CPT

## 2022-07-13 PROCEDURE — 97140 MANUAL THERAPY 1/> REGIONS: CPT

## 2022-07-13 ASSESSMENT — PAIN SCALES - GENERAL: PAINLEVEL_OUTOF10: 3

## 2022-07-13 NOTE — PROGRESS NOTES
Paulette Hugo  : 1945  Primary: [employer] Medicare  Secondary:  [facility]  [address]   [address] [place] [address] Way [address]  Phone: [phone]  Fax: [phone] Plan Frequency: 1x/week for 90 days    Plan of Care/Certification Expiration Date: 22      PT Visit Info:   No data recorded    OUTPATIENT PHYSICAL THERAPY:OP NOTE TYPE: Treatment Note 2022       Episode  }Appt Desk              Treatment Diagnosis: Lack of coordination (muscle incoordination) (R27.8)  Pelvic floor dysfunction in female (M62.98)  Frequency of micturition (R35.0)  Hesitancy of micturition (R39.11)  Nocturia (R35.1)  Feeling of incomplete bladder emptying (R39.14)  Interstitial cystitis (chronic) (N30.1)  Bladder pain (R39.89)  Pelvic and perineal pain (R10.2)  Medical/Referring Diagnosis:  PFD (pelvic floor dysfunction) [X66.54]  Referring Physician:  Juan Kiser MD Orders:  PT Eval and Treat  Return MD Appt:  22  Date of Onset:  No data recorded   Allergies:   Latex and Nickel  Restrictions/Precautions:  Restrictions/Precautions: None  No data recorded   Interventions Planned (Treatment may consist of any combination of the following):    Current Treatment Recommendations: Strengthening; ROM; ADL/Self-care training; Neuromuscular re-education; Manual Therapy - Soft Tissue Mobilization; Pain management; Home exercise program; Patient/Caregiver education & training; Modalities; Positioning; Therapeutic activities     Subjective Comments: IC/PBS, urethral pain with [id number]}  She has been doing very well this week, but did have some intense burning this morning. She did a lot of heavy housework yesterday because she was feeling good. She had one night that was really bad and took a while for her to be able to go to the restroom (took her about 1 hour). Overall she is getting up less at night to go to the restroom ~3x/night.  Bowel have been daily the last week; she is unable to use the squatty 7/13/22   The knack     Voiding strategies  7/6/22   Nocturia tips     Bowel/Bladder log     Bowel health education     Constipation care     Diarrhea/Fecal leakage     Colonic massage     Toilet positioning  7/6/22   Defecation dynamics     Sources of fiber     Return to intercourse/Dilator training     Sexual positioning     Lubricants/vaginal moisturizers     Vaginal irritants     Body mechanics     Posture/ergonomics     Diaphragmatic breathing     Resources and technology       MANUAL THERAPY: (15 minutes): Soft tissue mobilization was utilized and necessary because of the patient's restricted motion of soft tissue. Date Type Location Comments   7/13/2022 Internal assessment/treatment Via vaginal canal Single digit MT to all PFM layer to improve length, tenderness and relaxation    STM adductors Skin rolling    STM Abdominal wall/mons poubis Skin rolling         (Used abbreviations: MET - muscle energy technique; SCS- Strain counter strain; CTM-Connective tissue mobilizations; CR- Contract/Relax; SP- Sustained pressure; PIT- Positional inhibition techniques; STM Soft -tissue mobilization; MM- Myofascial mobilization; TrP-Trigger point release; IASTM- Instrument assisted soft tissue mobilizations, TDN-Trigger point dry needling)    Pt gives verbal consent to internal vaginal assessment/treatment without chaperon present. Treatment/Session Summary:    · Treatment Assessment:  Pt with improving tolerance of intravaginal manual therapy without increased pain/tenderness. Moderate tension persists throughout all PFM. Progression of stretching activities tolerated well with slight modifications made during happy to minimize urethral discomfort. Pt instructed in role and use of urge suppression to improve bladder retraining and urge/pressure sensations. Progressing well toward goals with improve tolerance of household tasks and sitting with decreased pain frequency.   · Communication/Consultation:  None today  · Equipment provided today:  None  · Recommendations/Intent for next treatment session: Next visit will focus on manual therapy, down training- stretching, check in - urge suppression    Total Treatment Billable Duration: 25 minutes TE, 15 minutes TA, 15 minutes MT  Time In: 9110  Time Out: Tomas Singleton, PT       Charge Capture  }Post Session Pain  PT Visit Info  [address] [facility] [place] [facility]  MD [address]    Future Appointments   Date Time Provider Oly Tim   7/20/2022  1:00 PM Evette Wong, PT [facility]   7/27/2022  9:00 AM Evette Wong, PT [facility]   8/3/2022  9:00 AM Evette Wong, PT [facility]   8/10/2022  9:00 [facility]   8/17/2022  9:00 AM Evette Wong, PT [facility]   8/24/2022  9:00 AM Evette Wong, PT [facility]   9/7/2022  8:20 AM MD REJI Pina [facility]   11/14/2022  8:20 AM Fatuma Tyler MD [facility]

## 2022-07-20 ENCOUNTER — HOSPITAL ENCOUNTER (OUTPATIENT)
Dept: PHYSICAL THERAPY | Age: 77
Setting detail: RECURRING SERIES
Discharge: HOME | End: 2022-07-23
Payer: MEDICARE

## 2022-07-20 PROCEDURE — 97110 THERAPEUTIC EXERCISES: CPT

## 2022-07-20 ASSESSMENT — PAIN SCALES - GENERAL: PAINLEVEL_OUTOF10: 0

## 2022-07-20 NOTE — PROGRESS NOTES
Tish Hugo  : 1945  Primary: [facility] - [employer] Medicare  Secondary:  [facility]  [address]  [address]  [address] [place] [address] Way [address]  Phone: [phone]  Fax: [phone] Plan Frequency: 1x/week for 90 days    Plan of Care/Certification Expiration Date: 22      PT Visit Info:   No data recorded    OUTPATIENT PHYSICAL THERAPY:OP NOTE TYPE: Treatment Note 2022       Episode  }Appt Desk              Treatment Diagnosis: Lack of coordination (muscle incoordination) (R27.8)  Pelvic floor dysfunction in female (M62.98)  Frequency of micturition (R35.0)  Hesitancy of micturition (R39.11)  Nocturia (R35.1)  Feeling of incomplete bladder emptying (R39.14)  Interstitial cystitis (chronic) (N30.1)  Bladder pain (R39.89)  Pelvic and perineal pain (R10.2)  Medical/Referring Diagnosis:  PFD (pelvic floor dysfunction) [Q21.77]  Referring Physician:  Dashawn Calle MD Orders:  PT Eval and Treat  Return MD Appt:  22  Date of Onset:  Onset Date:  ()     Allergies:   Latex and Nickel  Restrictions/Precautions:  Restrictions/Precautions: None  No data recorded   Interventions Planned (Treatment may consist of any combination of the following):    Current Treatment Recommendations: Strengthening; ROM; ADL/Self-care training; Neuromuscular re-education; Manual Therapy - Soft Tissue Mobilization; Pain management; Home exercise program; Patient/Caregiver education & training; Modalities; Positioning; Therapeutic activities     Subjective Comments: IC/PBS, urethral pain with [id number]}  She has done very well this week. \"I had a great week. \" She has been pacing herself more this week and has been doing well. She has had some mild, occasional pain here and there but overall her pain is much less! She is working with urge suppression and finding this to be challenging but overall doing well implementing.   Initial:}    0/10Post Session:       0/10  Medications Last Reviewed:  2022  Updated Objective Findings:  None Today  Treatment   THERAPEUTIC EXERCISE: (53 minutes):  Exercises per grid below to improve mobility, strength, and coordination. Required minimal visual, verbal and tactile cues to promote proper body breathing techniques and PFM relaxation and excursion . Progressed resistance, range, repetitions, and complexity of movement as indicated. Date:  6/29/22 Date:  7/6/22 Date:  7/13/22 Date:  7/20/22   Activity/Exercise Parameters Parameters Parameters    PFM coordination PF drops/relaxation to improve ROM and tone W/ MT to improve relaxation, ROM and tone     Diaphragmatic breathing To improve PFM excursion and ROM W/ MT to improve PFM excursion and ROM     Stepper   6 minutes L1 10 minutes L1   Adductor stretch  3 minutes 3x30s 3x30s   Single knee to chest stretch   3x30s B 3x30s B   Happy baby stretch   3x30s    Piriformis stretch   3x30s B 3x30s B; adjusted to minimize R knee pain   Hip abduction    3x10 lime   Bridge     3x10   Lumbar rotation    X20 w/ arm overhead for abdominal wall stretch     THERAPEUTIC ACTIVITY: ( 0 minutes): Functional activity education on avoiding bladder irritants, substitutions for common irritants, recommended fluid intake (types and spacing), appropriate voiding frequency, weight management and overall contributing factors of bowel health on bladder health/function in order to improve independent self management. Patient was provided a list of common bladder irritants including caffeine, carbonation, alcohol, artificial sweeteners, chocolate, acidic drinks, and tomato based drinks.  and Functional activity training on role and use of urge suppression in order to delay voiding, minimize urge urinary incontinence and improve control in the presence of urge triggers (ie. running water, key in lock, cold, etc.)    TA Educational Topic Notes Date Completed   Pathology/Anatomy/PFM Function     Bladder health education  7/13/22   Urinary urge suppression 7/13/22   The knack     Voiding strategies  7/6/22   Nocturia tips     Bowel/Bladder log     Bowel health education     Constipation care     Diarrhea/Fecal leakage     Colonic massage     Toilet positioning  7/6/22   Defecation dynamics     Sources of fiber     Return to intercourse/Dilator training     Sexual positioning     Lubricants/vaginal moisturizers     Vaginal irritants     Body mechanics     Posture/ergonomics     Diaphragmatic breathing     Resources and technology       MANUAL THERAPY: (0 minutes): Soft tissue mobilization was utilized and necessary because of the patient's restricted motion of soft tissue. Date Type Location Comments   7/20/2022 Internal assessment/treatment Via vaginal canal Single digit MT to all PFM layer to improve length, tenderness and relaxation    STM adductors Skin rolling    STM Abdominal wall/mons poubis Skin rolling         (Used abbreviations: MET - muscle energy technique; SCS- Strain counter strain; CTM-Connective tissue mobilizations; CR- Contract/Relax; SP- Sustained pressure; PIT- Positional inhibition techniques; STM Soft -tissue mobilization; MM- Myofascial mobilization; TrP-Trigger point release; IASTM- Instrument assisted soft tissue mobilizations, TDN-Trigger point dry needling)    Pt gives verbal consent to internal vaginal assessment/treatment without chaperon present. (Not performed today)    Treatment/Session Summary:    Treatment Assessment:  Pt is progressing very well with significantly decreased pain and tolerance of activity. She is being mindful and pacing activities around the house in order not to flare symptoms. Inititated gentle hip strengthening today, which patient tolerated well and without increase pain. Pt to begin above strengthening exercises 1x/day so long as symptoms do not worsen, decreased to EOD if needed. Continue with stretching daily.   Communication/Consultation:  None today  Equipment provided today:  None  Recommendations/Intent for next treatment session: Next visit will focus on manual therapy, strength progression, flexibility/stretching.     Total Treatment Billable Duration: 53 minutes TE  Time In: 1300  Time Out: [address], PT       Charge Capture  }Post Session Pain  PT Visit Info  [facility] Portal  MD Guidelines  Scanned Media  Benefits  MyChart    Future Appointments   Date Time Provider Oly Tim   7/27/2022  9:00 AM Jesus Elliott, PT [facility]   8/3/2022  9:00 AM Jesus Elliott, PT [facility]   8/10/2022  9:00 AM Jesus Elliott, PT [facility]   8/17/2022  9:00 AM Leslizzy Elliott, PT [facility]   8/24/2022  9:00 AM Jesus Elliott, PT [facility]   9/7/2022  8:20 AM MD REJI Rubi [facility] AM[facility]   11/14/2022  8:20 AM Brionna Irwin MD [facility]

## 2022-07-27 ENCOUNTER — HOSPITAL ENCOUNTER (OUTPATIENT)
Dept: PHYSICAL THERAPY | Age: 77
Setting detail: RECURRING SERIES
Discharge: HOME | End: 2022-07-30
Payer: MEDICARE

## 2022-07-27 PROCEDURE — 97530 THERAPEUTIC ACTIVITIES: CPT

## 2022-07-27 PROCEDURE — 97110 THERAPEUTIC EXERCISES: CPT

## 2022-07-27 ASSESSMENT — PAIN SCALES - GENERAL: PAINLEVEL_OUTOF10: 0

## 2022-07-27 NOTE — PROGRESS NOTES
Darren Hugo  : 1945  Primary: [place] [employer]  Secondary:  [facility]  [address] [address]  Phone: [phone]  Fax: [phone] Plan Frequency: 1x/week for 90 days    Plan of Care/Certification Expiration Date: 22      PT Visit Info:   No data recorded    OUTPATIENT PHYSICAL THERAPY:OP NOTE TYPE: Treatment Note 2022       Episode  }Appt Desk              Treatment Diagnosis: Lack of coordination (muscle incoordination) (R27.8)  Pelvic floor dysfunction in female (M62.98)  Frequency of micturition (R35.0)  Hesitancy of micturition (R39.11)  Nocturia (R35.1)  Feeling of incomplete bladder emptying (R39.14)  Interstitial cystitis (chronic) (N30.1)  Bladder pain (R39.89)  Pelvic and perineal pain (R10.2)  Medical/Referring Diagnosis:  PFD (pelvic floor dysfunction) [O56.46]  Referring Physician:  Musa Ahumada MD Orders:  PT Eval and Treat  Return MD Appt:  22  Date of Onset:  Onset Date:  ()     Allergies:   Latex and Nickel  Restrictions/Precautions:  Restrictions/Precautions: None  No data recorded   Interventions Planned (Treatment may consist of any combination of the following):    Current Treatment Recommendations: Strengthening; ROM; ADL/Self-care training; Neuromuscular re-education; Manual Therapy - Soft Tissue Mobilization; Pain management; Home exercise program; Patient/Caregiver education & training; Modalities; Positioning; Therapeutic activities     Subjective Comments: IC/PBS, urethral pain with [id number]}  Pt reports increased pain frequency this week. Does not recall anything different that she has done activity wise or dietary. Pain will ast for 30 minutes to half of the day. She felt a little discouraged but has continued with exercises. Having difficulty performing exercises on the bed and difficulty getting up and down off the ground.   Initial:}    0/10Post Session:       0/10  Medications Last Reviewed: 7/27/2022  Updated Objective Findings:  None Today  Treatment   THERAPEUTIC EXERCISE: (30 minutes):  Exercises per grid below to improve mobility, strength, and coordination. Required minimal visual, verbal and tactile cues to promote proper body breathing techniques and PFM relaxation and excursion . Progressed resistance, range, repetitions, and complexity of movement as indicated. Date:  6/29/22 Date:  7/6/22 Date:  7/13/22 Date:  7/20/22 Date:  7/27/22   Activity/Exercise Parameters Parameters Parameters     PFM coordination PF drops/relaxation to improve ROM and tone W/ MT to improve relaxation, ROM and tone      Diaphragmatic breathing To improve PFM excursion and ROM W/ MT to improve PFM excursion and ROM      Stepper   6 minutes L1 10 minutes L1 10 minutes L1   Adductor stretch  3 minutes 3x30s 3x30s 2 minutes   Single knee to chest stretch   3x30s B 3x30s B    Happy baby stretch   3x30s  1 minute   Piriformis stretch   3x30s B 3x30s B; adjusted to minimize R knee pain 3x30s B   Hip abduction    3x10 lime x30   Bridge     3x10 x20   Lumbar rotation    X20 w/ arm overhead for abdominal wall stretch      THERAPEUTIC ACTIVITY: (25 minutes): Instruction on functional pelvic brace exercise including feedforward activation of pelvic floor muscles on exhale prior to activity that increases intra-abdominal pressure (IAP) such as cough, sneeze, laugh, sit to stand, lifting object to decrease pressure on pelvic organs and muscles during exertional activities to minimize pelvic pain/pressure symptoms. and Extensive functional activity training on avoiding valsava/breath holding during strenuous activities as well as abdominal/pelvic bracing to provide muscular support and decrease symptoms of pelvic pain/pressure symptoms during lifting, sit to stand, getting in/out of car as well as log roll technique to decrease intraabdominal pressure.     TA Educational Topic Notes Date Completed   Pathology/Anatomy/PFM Function     Bladder health education  7/13/22   Urinary urge suppression  7/13/22   The knack     Voiding strategies  7/6/22   Nocturia tips     Bowel/Bladder log     Bowel health education     Constipation care     Diarrhea/Fecal leakage     Colonic massage     Toilet positioning  7/6/22   Defecation dynamics     Sources of fiber     Return to intercourse/Dilator training     Sexual positioning     Lubricants/vaginal moisturizers     Vaginal irritants     Body mechanics Lifting and carrying 6# from various heights to improve IAP management 7/27/22   Posture/ergonomics     Diaphragmatic breathing     Resources and technology       MANUAL THERAPY: (0 minutes): Soft tissue mobilization was utilized and necessary because of the patient's restricted motion of soft tissue. Date Type Location Comments   7/27/2022 Internal assessment/treatment Via vaginal canal Single digit MT to all PFM layer to improve length, tenderness and relaxation    STM adductors Skin rolling    STM Abdominal wall/mons poubis Skin rolling         (Used abbreviations: MET - muscle energy technique; SCS- Strain counter strain; CTM-Connective tissue mobilizations; CR- Contract/Relax; SP- Sustained pressure; PIT- Positional inhibition techniques; STM Soft -tissue mobilization; MM- Myofascial mobilization; TrP-Trigger point release; IASTM- Instrument assisted soft tissue mobilizations, TDN-Trigger point dry needling)    Pt gives verbal consent to internal vaginal assessment/treatment without chaperon present. (Not performed today)    Treatment/Session Summary:    Treatment Assessment:   Pt with increased lower abdominal/urethral discomfort during lifting with increased hip flexion. Modifying position to staggered stance in lunge positiong to increase hip extension allowing pt to perform task with decreased pain. Pt encourage to practice and apply lifting mechanics in order to improve function and minimize pain.   Communication/Consultation:  None today  Equipment provided today:  None  Recommendations/Intent for next treatment session: Next visit will focus on manual therapy, strength progression, flexibility/stretching.     Total Treatment Billable Duration: 30 minutes TE, 25 minutes TA  Time In: 0900  Time Out: [address], PT       Charge Capture  }Post Session Pain  PT Visit Info  [employer] Portal  MD Guidelines  Scanned Media  Benefits  MyChart    Future Appointments   Date Time Provider Oly Tim   8/3/2022  9:00 AM Balbir Carson, PT [facility]   8/10/2022  9:00 AM Balbir Carson, PT [facility]   8/17/2022  9:00 AM [facility], [facility]   8/24/2022  9:00 AM Balbir Carson, PT [facility]   9/7/2022  8:20 AM MD REJI Bates [facility]   11/14/2022  8:20 AM Samuel Bethea MD [facility]

## 2022-07-29 ENCOUNTER — TELEPHONE (OUTPATIENT)
Dept: FAMILY MEDICINE CLINIC | Facility: CLINIC | Age: 77
End: 2022-07-29

## 2022-07-29 NOTE — TELEPHONE ENCOUNTER
----- Message from Kendal Vale sent at 7/27/2022  2:29 PM EDT -----  Subject: Referral Request    Reason for referral request? blood work for appt in November   Provider patient wants to be referred to(if known):     Provider Phone Number(if known):     Additional Information for Provider? contact pt once it has been placed   please   ---------------------------------------------------------------------------  --------------  [employer]    [phone]; OK to leave message on voicemail  ---------------------------------------------------------------------------  --------------

## 2022-08-03 ENCOUNTER — HOSPITAL ENCOUNTER (OUTPATIENT)
Dept: PHYSICAL THERAPY | Age: 77
Setting detail: RECURRING SERIES
Discharge: HOME | End: 2022-08-06
Payer: MEDICARE

## 2022-08-03 PROCEDURE — 97110 THERAPEUTIC EXERCISES: CPT

## 2022-08-03 ASSESSMENT — PAIN SCALES - GENERAL: PAINLEVEL_OUTOF10: 1

## 2022-08-03 NOTE — PROGRESS NOTES
Paulette Hugo  : 1945  Primary: Cecille Joe Medicare  Secondary:  [facility]  [address]  [address] [place] [address]  Phone: [phone]  Fax: [phone] Plan Frequency: 1x/week for 90 days    Plan of Care/Certification Expiration Date: 22      PT Visit Info:   No data recorded    OUTPATIENT PHYSICAL THERAPY:OP NOTE TYPE: Treatment Note 8/3/2022       Episode  }Appt Desk              Treatment Diagnosis: Lack of coordination (muscle incoordination) (R27.8)  Pelvic floor dysfunction in female (M62.98)  Frequency of micturition (R35.0)  Hesitancy of micturition (R39.11)  Nocturia (R35.1)  Feeling of incomplete bladder emptying (R39.14)  Interstitial cystitis (chronic) (N30.1)  Bladder pain (R39.89)  Pelvic and perineal pain (R10.2)  Medical/Referring Diagnosis:  PFD (pelvic floor dysfunction) [Q97.03]  Referring Physician:  Juan Kiser MD Orders:  PT Eval and Treat  Return MD Appt:  22  Date of Onset:  Onset Date:  ()     Allergies:   Latex and Nickel  Restrictions/Precautions:  Restrictions/Precautions: None  No data recorded   Interventions Planned (Treatment may consist of any combination of the following):    Current Treatment Recommendations: Strengthening; ROM; ADL/Self-care training; Neuromuscular re-education; Manual Therapy - Soft Tissue Mobilization; Pain management; Home exercise program; Patient/Caregiver education & training; Modalities; Positioning; Therapeutic activities     Subjective Comments: IC/PBS, urethral pain with [id number]}  Pt reports improving pain over the past week. Noticed that she started using a different type of bread and thinks this might be a contributing factor over the last few weeks. Initial:}    1/10Post Session:       0/10  Medications Last Reviewed:  8/3/2022  Updated Objective Findings:  None Today  Treatment   THERAPEUTIC EXERCISE: (55 minutes):  Exercises per grid below to improve mobility, strength, and coordination. Required minimal visual, verbal and tactile cues to promote proper body breathing techniques and PFM relaxation and excursion . Progressed resistance, range, repetitions, and complexity of movement as indicated. Date:  6/29/22 Date:  7/6/22 Date:  7/13/22 Date:  7/20/22 Date:  7/27/22 Date:  8/3/22   Activity/Exercise Parameters Parameters Parameters      PFM coordination PF drops/relaxation to improve ROM and tone W/ MT to improve relaxation, ROM and tone       Diaphragmatic breathing To improve PFM excursion and ROM W/ MT to improve PFM excursion and ROM       Stepper   6 minutes L1 10 minutes L1 10 minutes L1 10 minutes L1   Adductor stretch  3 minutes 3x30s 3x30s 2 minutes 3x30s   Single knee to chest stretch   3x30s B 3x30s B  3x30s B   Happy baby stretch   3x30s  1 minute    Piriformis stretch   3x30s B 3x30s B; adjusted to minimize R knee pain 3x30s B 3x30s B IR/ER   Hip abduction    3x10 lime x30    Bridge     3x10 x20 3x10 w/ hip abd, lime   Lumbar rotation    X20 w/ arm overhead for abdominal wall stretch  x10   Clamshell       2x10   2x10 reverse   Sidestepping      4x10ft, orange band above knees   Squat      X12 w/ orange TB at knees   HEP      Strengthening pick 3 1x/day  Stretching 2x/day     THERAPEUTIC ACTIVITY: (0 minutes): Instruction on functional pelvic brace exercise including feedforward activation of pelvic floor muscles on exhale prior to activity that increases intra-abdominal pressure (IAP) such as cough, sneeze, laugh, sit to stand, lifting object to decrease pressure on pelvic organs and muscles during exertional activities to minimize pelvic pain/pressure symptoms.  and Extensive functional activity training on avoiding valsava/breath holding during strenuous activities as well as abdominal/pelvic bracing to provide muscular support and decrease symptoms of pelvic pain/pressure symptoms during lifting, sit to stand, getting in/out of car as well as log roll technique to decrease intraabdominal pressure. TA Educational Topic Notes Date Completed   Pathology/Anatomy/PFM Function     Bladder health education  7/13/22   Urinary urge suppression  7/13/22   The knack     Voiding strategies  7/6/22   Nocturia tips     Bowel/Bladder log     Bowel health education     Constipation care     Diarrhea/Fecal leakage     Colonic massage     Toilet positioning  7/6/22   Defecation dynamics     Sources of fiber     Return to intercourse/Dilator training     Sexual positioning     Lubricants/vaginal moisturizers     Vaginal irritants     Body mechanics Lifting and carrying 6# from various heights to improve IAP management 7/27/22   Posture/ergonomics     Diaphragmatic breathing     Resources and technology       MANUAL THERAPY: (0 minutes): Soft tissue mobilization was utilized and necessary because of the patient's restricted motion of soft tissue. Date Type Location Comments   8/3/2022 Internal assessment/treatment Via vaginal canal Single digit MT to all PFM layer to improve length, tenderness and relaxation    STM adductors Skin rolling    STM Abdominal wall/mons poubis Skin rolling         (Used abbreviations: MET - muscle energy technique; SCS- Strain counter strain; CTM-Connective tissue mobilizations; CR- Contract/Relax; SP- Sustained pressure; PIT- Positional inhibition techniques; STM Soft -tissue mobilization; MM- Myofascial mobilization; TrP-Trigger point release; IASTM- Instrument assisted soft tissue mobilizations, TDN-Trigger point dry needling)    Pt gives verbal consent to internal vaginal assessment/treatment without chaperon present. (Not performed today)    Treatment/Session Summary:    Treatment Assessment:   Pt with decreased pain intensity today and overall across the span of the week. She is tolerating strengthening exercises well without increased pain. Exercises progressed to tolerance today to improve global hip strength and pelvic support.  She does note increased urethral burning with reverse clamshell w/ R LE. This exercise held from [facility] due to increased pain. Flexiblity and down regulation at end of session to promote relaxation. Communication/Consultation:  None today  Equipment provided today:  None  Recommendations/Intent for next treatment session: Next visit will focus on manual therapy, strength progression, flexibility/stretching.     Total Treatment Billable Duration: 55 minutes TE  Time In: 6035  Time Out: 2527    Emely Plan, PT       Charge Capture  }Post Session Pain  PT Visit Info  [facility] Portal  MD Guidelines  Scanned Media  Benefits  MyChart    Future Appointments   Date Time Provider Oly Tim   8/10/2022  9:00 AM Emely Plan, PT [facility]   8/17/2022  9:00 AM Emely Plan, PT [facility]   8/24/2022  9:00 AM Emely Plan, PT [facility]   9/7/2022  8:20 AM MD REJI Foster [facility]   11/9/2022  8:45 AM PFP LAB [facility]   11/14/2022  8:20 AM Laura Frazier MD PFP [facility]

## 2022-08-10 ENCOUNTER — HOSPITAL ENCOUNTER (OUTPATIENT)
Dept: PHYSICAL THERAPY | Age: 77
Setting detail: RECURRING SERIES
Discharge: HOME | End: 2022-08-13
Payer: MEDICARE

## 2022-08-10 PROCEDURE — 97110 THERAPEUTIC EXERCISES: CPT

## 2022-08-10 ASSESSMENT — PAIN SCALES - GENERAL: PAINLEVEL_OUTOF10: 0

## 2022-08-10 NOTE — PROGRESS NOTES
Onelia Hugo  : 1945  Primary: Kylee Cassidy Medicare  Secondary:  [facility]  [address] [address]  Phone: [phone]  Fax: [phone] Plan Frequency: 1x/week for 90 days    Plan of Care/Certification Expiration Date: 22      PT Visit Info:   No data recorded    OUTPATIENT PHYSICAL THERAPY:OP NOTE TYPE: Treatment Note 8/10/2022       Episode  }Appt Desk              Treatment Diagnosis: Lack of coordination (muscle incoordination) (R27.8)  Pelvic floor dysfunction in female (M62.98)  Frequency of micturition (R35.0)  Hesitancy of micturition (R39.11)  Nocturia (R35.1)  Feeling of incomplete bladder emptying (R39.14)  Interstitial cystitis (chronic) (N30.1)  Bladder pain (R39.89)  Pelvic and perineal pain (R10.2)  Medical/Referring Diagnosis:  PFD (pelvic floor dysfunction) [R41.78]  Referring Physician:  Briseida Ma MD Orders:  PT Eval and Treat  Return MD Appt:  22  Date of Onset:  Onset Date:  ()     Allergies:   Latex and Nickel  Restrictions/Precautions:  Restrictions/Precautions: None  No data recorded   Interventions Planned (Treatment may consist of any combination of the following):    Current Treatment Recommendations: Strengthening; ROM; ADL/Self-care training; Neuromuscular re-education; Manual Therapy - Soft Tissue Mobilization; Pain management; Home exercise program; Patient/Caregiver education & training; Modalities; Positioning; Therapeutic activities     Subjective Comments: IC/PBS, urethral pain with [id number]}  She is doing well. Did have some increased pain on Saturday and yesterday. She is able to use her exercises to help alleivate her pain. Initial:}    0/10Post Session:       0/10  Medications Last Reviewed:  8/10/2022  Updated Objective Findings:  None Today  Treatment   THERAPEUTIC EXERCISE: (55 minutes):  Exercises per grid below to improve mobility, strength, and coordination.   Required minimal visual, verbal and tactile cues to promote proper body breathing techniques and PFM relaxation and excursion . Progressed resistance, range, repetitions, and complexity of movement as indicated. Date:  7/6/22 Date:  7/13/22 Date:  7/20/22 Date:  7/27/22 Date:  8/3/22 Date:  8/10/22   Activity/Exercise Parameters Parameters       PFM coordination W/ MT to improve relaxation, ROM and tone        Diaphragmatic breathing W/ MT to improve PFM excursion and ROM        Stepper  6 minutes L1 10 minutes L1 10 minutes L1 10 minutes L1 8 minutes L1.9   Adductor stretch 3 minutes 3x30s 3x30s 2 minutes 3x30s 3x30s   Single knee to chest stretch  3x30s B 3x30s B  3x30s B    Happy baby stretch  3x30s  1 minute  3x30s   Piriformis stretch  3x30s B 3x30s B; adjusted to minimize R knee pain 3x30s B 3x30s B IR/ER 3x30s B IR/ER   Hip abduction   3x10 lime x30  x15   Bridge    3x10 x20 3x10 w/ hip abd, lime X20 w/ lime  X15 w/ lime, narrow base     Lumbar rotation   X20 w/ arm overhead for abdominal wall stretch  x10 X20 w/ arm overhead for abdominal wall stretch   Clamshell      2x10   2x10 reverse X15, lime  X15, reverse   Sidestepping     4x10ft, orange band above knees 2x40ft, lime band below knees   Squat     X12 w/ orange TB at knees X15 w/ lime band below knees   HEP     Strengthening pick 3 1x/day  Stretching 2x/day    Sit to stand      X10   X10 w/ overhead press 3#   Monster walking      2x40ft, lime band below knees     THERAPEUTIC ACTIVITY: (0 minutes): Instruction on functional pelvic brace exercise including feedforward activation of pelvic floor muscles on exhale prior to activity that increases intra-abdominal pressure (IAP) such as cough, sneeze, laugh, sit to stand, lifting object to decrease pressure on pelvic organs and muscles during exertional activities to minimize pelvic pain/pressure symptoms.  and Extensive functional activity training on avoiding valsava/breath holding during strenuous activities as well as abdominal/pelvic bracing to provide muscular support and decrease symptoms of pelvic pain/pressure symptoms during lifting, sit to stand, getting in/out of car as well as log roll technique to decrease intraabdominal pressure. TA Educational Topic Notes Date Completed   Pathology/Anatomy/PFM Function     Bladder health education  7/13/22   Urinary urge suppression  7/13/22   The knack     Voiding strategies  7/6/22   Nocturia tips     Bowel/Bladder log     Bowel health education     Constipation care     Diarrhea/Fecal leakage     Colonic massage     Toilet positioning  7/6/22   Defecation dynamics     Sources of fiber     Return to intercourse/Dilator training     Sexual positioning     Lubricants/vaginal moisturizers     Vaginal irritants     Body mechanics Lifting and carrying 6# from various heights to improve IAP management 7/27/22   Posture/ergonomics     Diaphragmatic breathing     Resources and technology       MANUAL THERAPY: (0 minutes): Soft tissue mobilization was utilized and necessary because of the patient's restricted motion of soft tissue. Date Type Location Comments   8/10/2022 Internal assessment/treatment Via vaginal canal Single digit MT to all PFM layer to improve length, tenderness and relaxation    STM adductors Skin rolling    STM Abdominal wall/mons poubis Skin rolling         (Used abbreviations: MET - muscle energy technique; SCS- Strain counter strain; CTM-Connective tissue mobilizations; CR- Contract/Relax; SP- Sustained pressure; PIT- Positional inhibition techniques; STM Soft -tissue mobilization; MM- Myofascial mobilization; TrP-Trigger point release; IASTM- Instrument assisted soft tissue mobilizations, TDN-Trigger point dry needling)    Pt gives verbal consent to internal vaginal assessment/treatment without chaperon present. (Not performed today)    Treatment/Session Summary:    Treatment Assessment:   Pt continues to progress well with general hip strengthening.  Demonstrate increased difficulty with eccentric control of the LE with increased adductor activation. Communication/Consultation:  None today  Equipment provided today:  None  Recommendations/Intent for next treatment session: Next visit will focus on manual therapy, strength progression, flexibility/stretching.     Total Treatment Billable Duration: 48 minutes TE  Time In: 7941  Time Out: 0945    Puja Mayo, PT       Charge Capture  }Post Session Pain  PT Visit Info  [facility] Portal  MD Guidelines  Scanned Media  Benefits  MyChart    Future Appointments   Date Time Provider Oly Tim   8/16/2022  9:00 AM Puja Mayo, PT [facility]   8/24/2022  9:00 AM Puja Mayo, PT [facility]   9/7/2022  8:20 AM Matilda Odell MD [facility]   11/9/2022  8:45 AM PFP LAB [facility]   11/14/2022  8:20 AM Harsha Armando MD PFP [facility] AM[facility]

## 2022-08-15 ENCOUNTER — HOSPITAL ENCOUNTER (OUTPATIENT)
Dept: PHYSICAL THERAPY | Age: 77
Setting detail: RECURRING SERIES
Discharge: HOME | End: 2022-08-18
Payer: MEDICARE

## 2022-08-15 PROCEDURE — 97140 MANUAL THERAPY 1/> REGIONS: CPT

## 2022-08-15 PROCEDURE — 97110 THERAPEUTIC EXERCISES: CPT

## 2022-08-15 ASSESSMENT — PAIN SCALES - GENERAL: PAINLEVEL_OUTOF10: 0

## 2022-08-15 NOTE — PROGRESS NOTES
Sunitha Hugo  : 1945  Primary: [facility] [employer] Medicare  Secondary[facility]  [facility]  [address]  [address]  [address] [address]  Phone: [phone]  Fax: [phone] Plan Frequency: 1x/week for 90 days    Plan of Care/Certification Expiration Date: 22      PT Visit Info:   No data recorded    OUTPATIENT PHYSICAL THERAPY:OP NOTE TYPE: Treatment Note 8/15/2022       Episode  }Appt Desk              Treatment Diagnosis: Lack of coordination (muscle incoordination) (R27.8)  Pelvic floor dysfunction in female (M62.98)  Frequency of micturition (R35.0)  Hesitancy of micturition (R39.11)  Nocturia (R35.1)  Feeling of incomplete bladder emptying (R39.14)  Interstitial cystitis (chronic) (N30.1)  Bladder pain (R39.89)  Pelvic and perineal pain (R10.2)  Medical/Referring Diagnosis:  PFD (pelvic floor dysfunction) [S62.50]  Referring Physician:  Swathi Poole MD Orders:  PT Eval and Treat  Return MD Appt:  22  Date of Onset:  Onset Date:  ()     Allergies:   Latex and Nickel  Restrictions/Precautions:  Restrictions/Precautions: None  No data recorded   Interventions Planned (Treatment may consist of any combination of the following):    Current Treatment Recommendations: Strengthening; ROM; ADL/Self-care training; Neuromuscular re-education; Manual Therapy - Soft Tissue Mobilization; Pain management; Home exercise program; Patient/Caregiver education & training; Modalities; Positioning; Therapeutic activities     Subjective Comments: IC/PBS, urethral pain with [id number]}  Continues to do very well with more \"good days than bad days\". Starting using \"Regular Girl\", a probiotic that she found recommended on the IC website. Has been playing around with this as far as frequency and effects on pain.   Initial:}    0/10Post Session:       0/10  Medications Last Reviewed:  8/15/2022  Updated Objective Findings:   see below  External Observations:  Voluntary contraction: [] absent     [x] Present (TRACE)  Voluntary relaxation: [] absent     [x] present  Postural assessment: Forward Head Posture and Rounded Shoulders  Gait: WFL    Pelvic Floor Muscle (PFM) Assessment:  Vaginal vault size: [] decreased     [] increased     [x] WNL  Muscle volume: [] decreased     [x] WNL     [] Defect  PFM tension: [] decreased     [x] increased     [] WNL (MILDLY)    Contraction ability:  Voluntary contraction: [] absent     [x] weak     [] moderate      [] strong  Voluntary relaxation: [] absent     [] partial     [x] complete   MMT: 1/5     Palpation: via vaginal canal ; mild \"stinging\" sensation at layer one with palpation at introitus, improved w/ S/CS  Superficial Pelvic Floor Musculature (PFM): Tender? Intermediate PFM Tender? Deep PFM Tender? Superficial Transverse Perineal [] Right  [] Left  []DNT Deep Transverse Perineal [] Right  [] Left  []DNT Puborectalis [] Right  [] Left  []DNT   Ischiocavernosus [] Right  [] Left  []DNT Compressor Urethra [] Right  [] Left  []DNT Pubococcygeus [] Right  [] Left  []DNT   Bulbocavernosus [] Right  [] Left  []DNT   Iliococcygeus [] Right  [] Left  []DNT       Obturator Internus [] Right  [] Left  []DNT       Coccygeus [] Right  [] Left  []DNT     Treatment   THERAPEUTIC EXERCISE: (45 minutes):  Exercises per grid below to improve mobility, strength, and coordination. Required minimal visual, verbal and tactile cues to promote proper body breathing techniques and PFM relaxation and excursion . Progressed resistance, range, repetitions, and complexity of movement as indicated.      Date:  7/20/22 Date:  7/27/22 Date:  8/3/22 Date:  8/10/22 Date:  8/15/22   Activity/Exercise        PFM coordination        Diaphragmatic breathing        Stepper 10 minutes L1 10 minutes L1 10 minutes L1 8 minutes L1.9 10 minutes L2   Adductor stretch 3x30s 2 minutes 3x30s 3x30s 3x30s   Single knee to chest stretch 3x30s B  3x30s B     Happy baby stretch  1 minute  3x30s 3x30   Piriformis education     Constipation care     Diarrhea/Fecal leakage     Colonic massage     Toilet positioning  7/6/22   Defecation dynamics     Sources of fiber     Return to intercourse/Dilator training     Sexual positioning     Lubricants/vaginal moisturizers     Vaginal irritants     Body mechanics Lifting and carrying 6# from various heights to improve IAP management 7/27/22   Posture/ergonomics     Diaphragmatic breathing     Resources and technology       MANUAL THERAPY: (10 minutes): Soft tissue mobilization was utilized and necessary because of the patient's restricted motion of soft tissue. Date Type Location Comments   8/15/2022 Internal assessment/treatment Via vaginal canal Single digit MT to all PFM layer to improve length, tenderness and relaxation   Not today STM adductors Skin rolling   Not today STM Abdominal wall/mons poubis Skin rolling         (Used abbreviations: MET - muscle energy technique; SCS- Strain counter strain; CTM-Connective tissue mobilizations; CR- Contract/Relax; SP- Sustained pressure; PIT- Positional inhibition techniques; STM Soft -tissue mobilization; MM- Myofascial mobilization; TrP-Trigger point release; IASTM- Instrument assisted soft tissue mobilizations, TDN-Trigger point dry needling)    Pt gives verbal consent to internal vaginal assessment/treatment without chaperon present. Treatment/Session Summary:    Treatment Assessment:   Pt with significantlly decreased tenderness with palpation of the PFM since initial evaluation. She does not mild \"stinging\" sensation today at introitus which improved with gentle shortening of the mon pubis. She continues to have limited motion of the PFM with weak contraction present. Will continue to focus on coordination of breathing with movement in order to continue to management bladder/urethral pain.   Communication/Consultation:  None today  Equipment provided today:  None  Recommendations/Intent for next treatment session: Next visit will focus on manual therapy, strength progression, flexibility/stretching.     Total Treatment Billable Duration: 45 minutes TE, 10 minutes MT  Time In: 0900  Time Out: [address] Berger, PT       Charge Capture  }Post Session Pain  PT Visit Info  [employer] Portal  MD Guidelines  Scanned Media  Benefits  MyChart    Future Appointments   Date Time Provider Oly Tim   8/24/2022  9:00 AM Elizabeth Russell, B[facility]   9/7/2022  8:20 AM Rl Mccarthy MD [facility]   11/9/2022  8:45 AM PFP LAB PFP [facility] AM[facility]   11/14/2022  8:20 AM Kevin Keene MD PFP [facility] AM[facility]

## 2022-08-17 ENCOUNTER — APPOINTMENT (RX ONLY)
Dept: URBAN - METROPOLITAN AREA CLINIC 23 | Facility: CLINIC | Age: 77
Setting detail: DERMATOLOGY
End: 2022-08-17

## 2022-08-17 ENCOUNTER — APPOINTMENT (OUTPATIENT)
Dept: PHYSICAL THERAPY | Age: 77
End: 2022-08-17
Payer: MEDICARE

## 2022-08-17 DIAGNOSIS — Z85.828 PERSONAL HISTORY OF OTHER MALIGNANT NEOPLASM OF SKIN: ICD-10-CM

## 2022-08-17 DIAGNOSIS — D485 NEOPLASM OF UNCERTAIN BEHAVIOR OF SKIN: ICD-10-CM

## 2022-08-17 DIAGNOSIS — D22 MELANOCYTIC NEVI: ICD-10-CM

## 2022-08-17 DIAGNOSIS — L82.1 OTHER SEBORRHEIC KERATOSIS: ICD-10-CM

## 2022-08-17 DIAGNOSIS — L57.0 ACTINIC KERATOSIS: ICD-10-CM

## 2022-08-17 PROBLEM — F41.9 ANXIETY DISORDER, UNSPECIFIED: Status: ACTIVE | Noted: 2022-08-17

## 2022-08-17 PROBLEM — D48.5 NEOPLASM OF UNCERTAIN BEHAVIOR OF SKIN: Status: ACTIVE | Noted: 2022-08-17

## 2022-08-17 PROBLEM — J30.1 ALLERGIC RHINITIS DUE TO POLLEN: Status: ACTIVE | Noted: 2022-08-17

## 2022-08-17 PROBLEM — D22.61 MELANOCYTIC NEVI OF RIGHT UPPER LIMB, INCLUDING SHOULDER: Status: ACTIVE | Noted: 2022-08-17

## 2022-08-17 PROBLEM — D22.62 MELANOCYTIC NEVI OF LEFT UPPER LIMB, INCLUDING SHOULDER: Status: ACTIVE | Noted: 2022-08-17

## 2022-08-17 PROCEDURE — ? OTHER

## 2022-08-17 PROCEDURE — 11102 TANGNTL BX SKIN SINGLE LES: CPT

## 2022-08-17 PROCEDURE — ? LIQUID NITROGEN

## 2022-08-17 PROCEDURE — ? EDUCATIONAL RESOURCES PROVIDED

## 2022-08-17 PROCEDURE — ? COUNSELING

## 2022-08-17 PROCEDURE — ? BIOPSY BY SHAVE METHOD

## 2022-08-17 PROCEDURE — 99213 OFFICE O/P EST LOW 20 MIN: CPT | Mod: 25

## 2022-08-17 PROCEDURE — 17000 DESTRUCT PREMALG LESION: CPT | Mod: 59

## 2022-08-17 ASSESSMENT — LOCATION DETAILED DESCRIPTION DERM
LOCATION DETAILED: LEFT ANTERIOR PROXIMAL UPPER ARM
LOCATION DETAILED: LEFT LATERAL TEMPLE
LOCATION DETAILED: LEFT DISTAL PRETIBIAL REGION
LOCATION DETAILED: LEFT INFERIOR LATERAL FOREHEAD
LOCATION DETAILED: LEFT PROXIMAL DORSAL FOREARM
LOCATION DETAILED: RIGHT PROXIMAL DORSAL FOREARM
LOCATION DETAILED: LEFT PROXIMAL RADIAL DORSAL RING FINGER
LOCATION DETAILED: LEFT SUPERIOR PARIETAL SCALP

## 2022-08-17 ASSESSMENT — LOCATION SIMPLE DESCRIPTION DERM
LOCATION SIMPLE: SCALP
LOCATION SIMPLE: LEFT FOREARM
LOCATION SIMPLE: LEFT PRETIBIAL REGION
LOCATION SIMPLE: LEFT UPPER ARM
LOCATION SIMPLE: LEFT TEMPLE
LOCATION SIMPLE: LEFT RING FINGER
LOCATION SIMPLE: RIGHT FOREARM
LOCATION SIMPLE: LEFT FOREHEAD

## 2022-08-17 ASSESSMENT — LOCATION ZONE DERM
LOCATION ZONE: ARM
LOCATION ZONE: FACE
LOCATION ZONE: LEG
LOCATION ZONE: SCALP
LOCATION ZONE: FINGER

## 2022-08-17 NOTE — PROCEDURE: OTHER
Note Text (......Xxx Chief Complaint.): This diagnosis correlates with the
Render Risk Assessment In Note?: no
Other (Free Text): LN2
Detail Level: Detailed

## 2022-08-17 NOTE — PROCEDURE: BIOPSY BY SHAVE METHOD
Anesthesia Type: 1% lidocaine with epinephrine and a 1:10 solution of 8.4% sodium bicarbonate
Biopsy Method: Dermablade
Accession #: [id number]
Dressing: pressure dressing with telfa
Validate Triangulation: No
Detail Level: Detailed
Size Of Lesion In Cm: 0
Outside Pathology Billing: outside pathology read only
Billing Type: Third-Party Bill
Consent: Verbal consent was obtained and risks were reviewed including but not limited to scarring, infection, bleeding, scabbing, incomplete removal, nerve damage and allergy to anesthesia.
Cryotherapy Text: The wound bed was treated with cryotherapy after the biopsy was performed.
Render Path Notes In Note?: Yes
Type Of Destruction Used: Curettage
Silver Nitrate Text: The wound bed was treated with silver nitrate after the biopsy was performed.
Wound Care: Vaseline
Biopsy Type: H and E
Electrodesiccation And Curettage Text: The wound bed was treated with electrodesiccation and curettage after the biopsy was performed.
Post-Care Instructions: I reviewed with the patient in detail post-care instructions. Patient is to keep the biopsy site dry overnight, and then apply bacitracin twice daily until healed. Patient may apply hydrogen peroxide soaks to remove any crusting.
Information: Selecting Yes will display possible errors in your note based on the variables you have selected. This validation is only offered as a suggestion for you. PLEASE NOTE THAT THE VALIDATION TEXT WILL BE REMOVED WHEN YOU FINALIZE YOUR NOTE. IF YOU WANT TO FAX A PRELIMINARY NOTE YOU WILL NEED TO TOGGLE THIS TO 'NO' IF YOU DO NOT WANT IT IN YOUR FAXED NOTE.
Anesthesia Volume In Cc: 1
Depth Of Biopsy: dermis
Hemostasis: Electrocautery
Notification Instructions: Patient will be notified of biopsy results. However, patient instructed to call the office if not contacted within 2 weeks.
Electrodesiccation Text: The wound bed was treated with electrodesiccation after the biopsy was performed.

## 2022-08-17 NOTE — PROCEDURE: LIQUID NITROGEN
Render Post-Care Instructions In Note?: no
Show Applicator Variable?: Yes
Duration Of Freeze Thaw-Cycle (Seconds): 5
Consent: The patient's verbal consent was obtained including but not limited to risks of crusting, scabbing, blistering, scarring, darker or lighter pigmentary change, recurrence, incomplete removal and infection.
Number Of Freeze-Thaw Cycles: 1 freeze-thaw cycle
Post-Care Instructions: I reviewed with the patient in detail post-care instructions. Patient is to wear sunprotection, and avoid picking at any of the treated lesions. Pt may apply Vaseline to crusted or scabbing areas. Will re-check at follow up
Detail Level: Detailed

## 2022-08-24 ENCOUNTER — HOSPITAL ENCOUNTER (OUTPATIENT)
Dept: PHYSICAL THERAPY | Age: 77
Setting detail: RECURRING SERIES
Discharge: HOME | End: 2022-08-27
Payer: MEDICARE

## 2022-08-24 PROCEDURE — 97110 THERAPEUTIC EXERCISES: CPT

## 2022-08-24 PROCEDURE — 97530 THERAPEUTIC ACTIVITIES: CPT

## 2022-08-24 ASSESSMENT — PAIN SCALES - GENERAL: PAINLEVEL_OUTOF10: 0

## 2022-08-24 NOTE — PROGRESS NOTES
Finesse Hugo  : 1945  Primary: [employer]  Secondary:  [facility]  [address]  [address]  [address] [place] [address] Way [address]  Phone: [phone]  Fax: [phone] Plan Frequency: 1x/week for 90 days    Plan of Care/Certification Expiration Date: 22      PT Visit Info:   No data recorded    OUTPATIENT PHYSICAL THERAPY:OP NOTE TYPE: Treatment Note 2022       Episode  }Appt Desk              Treatment Diagnosis: Lack of coordination (muscle incoordination) (R27.8)  Pelvic floor dysfunction in female (M62.98)  Frequency of micturition (R35.0)  Hesitancy of micturition (R39.11)  Nocturia (R35.1)  Feeling of incomplete bladder emptying (R39.14)  Interstitial cystitis (chronic) (N30.1)  Bladder pain (R39.89)  Pelvic and perineal pain (R10.2)  Medical/Referring Diagnosis:  PFD (pelvic floor dysfunction) [P13.19]  Referring Physician:  Emeka Huertas MD Orders:  PT Eval and Treat  Return MD Appt:  22  Date of Onset:  Onset Date:  ()     Allergies:   Latex and Nickel  Restrictions/Precautions:  Restrictions/Precautions: None  No data recorded   Interventions Planned (Treatment may consist of any combination of the following):    Current Treatment Recommendations: Strengthening; ROM; ADL/Self-care training; Neuromuscular re-education; Manual Therapy - Soft Tissue Mobilization; Pain management; Home exercise program; Patient/Caregiver education & training; Modalities; Positioning; Therapeutic activities     Subjective Comments: IC/PBS, urethral pain with [id number]}  Overall doing very well. She does note onset of pain around 3p. Feels that this is associated with bowels. Once she is about to relieve her bowels pain resolves. Stool type 2-3.   Initial:}    0/10Post Session:       0/10  Medications Last Reviewed:  2022  Updated Objective Findings:  None Today  Treatment   THERAPEUTIC EXERCISE: (40 minutes):  Exercises per grid below to improve mobility, strength, and coordination. Required minimal visual, verbal and tactile cues to promote proper body breathing techniques and PFM relaxation and excursion . Progressed resistance, range, repetitions, and complexity of movement as indicated. Date:  7/20/22 Date:  7/27/22 Date:  8/3/22 Date:  8/10/22 Date:  8/15/22 Date:  8/24/22   Activity/Exercise         PFM coordination         Diaphragmatic breathing         Stepper 10 minutes L1 10 minutes L1 10 minutes L1 8 minutes L1.9 10 minutes L2 10 minutes L2   Adductor stretch 3x30s 2 minutes 3x30s 3x30s 3x30s    Single knee to chest stretch 3x30s B  3x30s B   3x30s B   Happy baby stretch  1 minute  3x30s 3x30 3x30s   Piriformis stretch 3x30s B; adjusted to minimize R knee pain 3x30s B 3x30s B IR/ER 3x30s B IR/ER     Hip abduction 3x10 lime x30  x15  2x10 standing at //   Bridge  3x10 x20 3x10 w/ hip abd, lime X20 w/ lime  X15 w/ lime, narrow base   2x15 w/ lime    Lumbar rotation X20 w/ arm overhead for abdominal wall stretch  x10 X20 w/ arm overhead for abdominal wall stretch X20 w/ arm overhead for abdominal wall stretch    Clamshell    2x10   2x10 reverse X15, lime  X15, reverse X15, lime  X15, reverse    Sidestepping   4x10ft, orange band above knees 2x40ft, lime band below knees 2x40ft, orange band below knees Resisted sidestep, resistance at umbilicus, blue TB, w/ palloff press x10 B   Squat   X12 w/ orange TB at knees X15 w/ lime band below knees  2x10 squat to heel raise at //    HEP   Strengthening pick 3 1x/day  Stretching 2x/day      Sit to stand    X10   X10 w/ overhead press 3# x10    Monster walking    2x40ft, lime band below knees 2x40ft, orange band below knees    Row     X30 blue tubing X30 blue tubing   Lat pulldown     2x15 blue tubing X30 blue tubing   Low row     X30 blue tubing X30 blue tubing                                THERAPEUTIC ACTIVITY: (15 minutes):  Bowel Program: extensive instruction on insoluble vs. soluble fiber types in regards to patient's needs of stool bulking/formed stool, ADA recommendations for fiber intake with handout of examples, fiber supplements to aid in stool bulking (e.g. Citrucel), morning toileting schedule with aid of gastrocolic reflex, and appropriate toilet positioning. TA Educational Topic Notes Date Completed   Pathology/Anatomy/PFM Function     Bladder health education  7/13/22   Urinary urge suppression  7/13/22   The knack     Voiding strategies  7/6/22   Nocturia tips     Bowel/Bladder log     Bowel health education  8/24/22   Constipation care     Diarrhea/Fecal leakage     Colonic massage     Toilet positioning  7/6/22   Defecation dynamics     Sources of fiber     Return to intercourse/Dilator training     Sexual positioning     Lubricants/vaginal moisturizers     Vaginal irritants     Body mechanics Lifting and carrying 6# from various heights to improve IAP management 7/27/22   Posture/ergonomics     Diaphragmatic breathing     Resources and technology       MANUAL THERAPY: (0 minutes): Soft tissue mobilization was utilized and necessary because of the patient's restricted motion of soft tissue. Date Type Location Comments   8/24/2022 Internal assessment/treatment Via vaginal canal Single digit MT to all PFM layer to improve length, tenderness and relaxation   Not today STM adductors Skin rolling   Not today STM Abdominal wall/mons poubis Skin rolling         (Used abbreviations: MET - muscle energy technique; SCS- Strain counter strain; CTM-Connective tissue mobilizations; CR- Contract/Relax; SP- Sustained pressure; PIT- Positional inhibition techniques; STM Soft -tissue mobilization; MM- Myofascial mobilization; TrP-Trigger point release; IASTM- Instrument assisted soft tissue mobilizations, TDN-Trigger point dry needling)    Pt gives verbal consent to internal vaginal assessment/treatment without chaperon present.  (Not performed today)    Treatment/Session Summary:    Treatment Assessment:   Pt continues to do very well. Most noted increased in pain soon before bowel movement/with full bowels. Discussed bowel health management and strategies to improve bowel health in order to minimize discomfort. Pt to work on bowel health and routine this week. Discussed that this may take several weeks, but consistency is helpful when retraining bowels. Communication/Consultation:  None today  Equipment provided today:  None  Recommendations/Intent for next treatment session: Next visit will focus on check in on bowel health recommendations/adjustments.     Total Treatment Billable Duration: 40 minutes TE, 15 minutes TA  Time In: 7362  Time Out: Oly Gomez PT       Charge Capture  }Post Session Pain  PT Visit Info  [employer] Portal  MD Guidelines  Scanned Media  Benefits  MyChart    Future Appointments   Date Time Provider Oly Tim   8/31/2022 10:00 AM Pete Short PT [facility]   9/7/2022  8:20 AM Wyatt Olivares MD [facility]   11/9/2022  8:45 AM PFP LAB [facility]   11/14/2022  8:20 AM Liset Escalante MD PFP [facility]

## 2022-08-31 ENCOUNTER — HOSPITAL ENCOUNTER (OUTPATIENT)
Dept: PHYSICAL THERAPY | Age: 77
Setting detail: RECURRING SERIES
Discharge: HOME | End: 2022-09-03
Payer: MEDICARE

## 2022-08-31 ENCOUNTER — APPOINTMENT (OUTPATIENT)
Dept: PHYSICAL THERAPY | Age: 77
End: 2022-08-31
Payer: MEDICARE

## 2022-08-31 PROCEDURE — 97530 THERAPEUTIC ACTIVITIES: CPT

## 2022-08-31 PROCEDURE — 97140 MANUAL THERAPY 1/> REGIONS: CPT

## 2022-08-31 PROCEDURE — 97110 THERAPEUTIC EXERCISES: CPT

## 2022-08-31 ASSESSMENT — PAIN SCALES - GENERAL: PAINLEVEL_OUTOF10: 0

## 2022-08-31 NOTE — PROGRESS NOTES
Samara Hugo  : 1945  Primary: Henrik Mccarthy Medicare  Secondary:  O [address],   [address] [address]  Phone: [phone]  Fax: [phone] Plan Frequency: 1x/week for 90 days    Plan of Care/Certification Expiration Date: 22      PT Visit Info: Total # of Visits Approved: 11 (Eval+10 visits; exp 9/3/22)  Total # of Visits to Date: 10      OUTPATIENT PHYSICAL THERAPY:OP NOTE TYPE: Treatment Note 2022       Episode  }Appt Desk              Treatment Diagnosis: Lack of coordination (muscle incoordination) (R27.8)  Pelvic floor dysfunction in female (M62.98)  Frequency of micturition (R35.0)  Hesitancy of micturition (R39.11)  Nocturia (R35.1)  Feeling of incomplete bladder emptying (R39.14)  Interstitial cystitis (chronic) (N30.1)  Bladder pain (R39.89)  Pelvic and perineal pain (R10.2)  Medical/Referring Diagnosis:  PFD (pelvic floor dysfunction) [Y95.61]  Referring Physician:  Marcelo Amanda MD Orders:  PT Eval and Treat  Return MD Appt:  22  Date of Onset:  Onset Date:  ()     Allergies:   Latex and Nickel  Restrictions/Precautions:  Restrictions/Precautions: None  No data recorded   Interventions Planned (Treatment may consist of any combination of the following):    Current Treatment Recommendations: Strengthening; ROM; ADL/Self-care training; Neuromuscular re-education; Manual Therapy - Soft Tissue Mobilization; Pain management; Home exercise program; Patient/Caregiver education & training; Modalities; Positioning; Therapeutic activities     Subjective Comments: IC/PBS, urethral pain with [id number]}  She is doing well today. She had a very stressful weekend with her daughter having to be admitted to the hospital. 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 She has had pain intermittently, but able to using stretches/strategies to mangement pain well.   Initial:}    0/10Post Session:       0/10  Medications Last Reviewed: 8/31/2022  Updated Objective Findings:  See evaluation note from today  Treatment   THERAPEUTIC EXERCISE: (25 minutes):  Exercises per grid below to improve mobility, strength, and coordination. Required minimal visual, verbal and tactile cues to promote proper body breathing techniques and PFM relaxation and excursion . Progressed resistance, range, repetitions, and complexity of movement as indicated.      Date:  7/20/22 Date:  7/27/22 Date:  8/3/22 Date:  8/10/22 Date:  8/15/22 Date:  8/24/22 Date:  8/31/22   Activity/Exercise          PFM coordination       W/ digital palpation   Diaphragmatic breathing          Stepper 10 minutes L1 10 minutes L1 10 minutes L1 8 minutes L1.9 10 minutes L2 10 minutes L2 10 minutes L2   Adductor stretch 3x30s 2 minutes 3x30s 3x30s 3x30s     Single knee to chest stretch 3x30s B  3x30s B   3x30s B    Happy baby stretch  1 minute  3x30s 3x30 3x30s    Piriformis stretch 3x30s B; adjusted to minimize R knee pain 3x30s B 3x30s B IR/ER 3x30s B IR/ER      Hip abduction 3x10 lime x30  x15  2x10 standing at //    Bridge  3x10 x20 3x10 w/ hip abd, lime X20 w/ lime  X15 w/ lime, narrow base   2x15 w/ lime     Lumbar rotation X20 w/ arm overhead for abdominal wall stretch  x10 X20 w/ arm overhead for abdominal wall stretch X20 w/ arm overhead for abdominal wall stretch     Clamshell    2x10   2x10 reverse X15, lime  X15, reverse X15, lime  X15, reverse     Sidestepping   4x10ft, orange band above knees 2x40ft, lime band below knees 2x40ft, orange band below knees Resisted sidestep, resistance at umbilicus, blue TB, w/ palloff press x10 B    Squat   X12 w/ orange TB at knees X15 w/ lime band below knees  2x10 squat to heel raise at //     HEP   Strengthening pick 3 1x/day  Stretching 2x/day    Review of HEP; frequency; pt Q&A   Sit to stand    X10   X10 w/ overhead press 3# x10     Monster walking    2x40ft, lime band below knees 2x40ft, orange band below knees     Row     X30 blue tubing X30 assessment/treatment Via vaginal canal Single digit MT to all PFM layer to improve length, tenderness and relaxation    STM adductors Skin rolling   Not today STM Abdominal wall/mons poubis Skin rolling         (Used abbreviations: MET - muscle energy technique; SCS- Strain counter strain; CTM-Connective tissue mobilizations; CR- Contract/Relax; SP- Sustained pressure; PIT- Positional inhibition techniques; STM Soft -tissue mobilization; MM- Myofascial mobilization; TrP-Trigger point release; IASTM- Instrument assisted soft tissue mobilizations, TDN-Trigger point dry needling)    Pt gives verbal consent to internal vaginal assessment/treatment without chaperon present. Treatment/Session Summary:    Treatment Assessment:   Ms. Jodi Hernandez has been seen in skilled PT from 6/29/22 to 8/31/22. Patient has attended 10 out of 10 scheduled visits, with 0 cancellation(s) and 0 no shows. Treatment has emphasized manual therapy, bowel and bladder health education and retraining, pain management strategies, flexibility and relaxation, general strengthening and conditioning. Patient responded well to therapy, with improvements in urinary frequency, pain intensity and frequency and improved tolerance of physical activities and ADLs. Pt has achieved goals as indicated below and all questions have been answered to their satisfaction. Pt was invited to call with any further questions or concerns as needed.   Communication/Consultation:  None today  Equipment provided today:  None    Total Treatment Billable Duration: 25 minutes TE,  15 minutes TA, 15 minutes MT  Time In: 1000  Time Out: [address], PT       Charge Capture  }Post Session Pain  PT Visit Info  [employer] Portal  MD Guidelines  Scanned Media  Benefits  MyChart    Future Appointments   Date Time Provider Oly Tim   9/7/2022  8:20 AM MD REJI Huddleston [facility]   11/9/2022  8:45 AM PFP LAB [facility]   11/14/2022  8:00 AM Andrew Maxwell MD [facility]

## 2022-08-31 NOTE — DISCHARGE SUMMARY
dis     Paulette Hugo  : 1945  Primary: Cecille Joe Medicare  Secondary:  [address],   [address] [address]  Phone: [phone]  Fax: [phone] Plan Frequency: 1x/week for 90 days    Plan of Care/Certification Expiration Date: 22      PT Visit Info: Total # of Visits Approved: 11 (Eval+10 visits; exp 9/3/22)  Total # of Visits to Date: 10      OUTPATIENT PHYSICAL THERAPY:OP NOTE TYPE: Discharge Summary 2022               Episode  Appt Desk         Treatment Diagnosis: Lack of coordination (muscle incoordination) (R27.8)  Pelvic floor dysfunction in female (M62.98)  Frequency of micturition (R35.0)  Hesitancy of micturition (R39.11)  Nocturia (R35.1)  Feeling of incomplete bladder emptying (R39.14)  Interstitial cystitis (chronic) (N30.1)  Bladder pain (R39.89)  Pelvic and perineal pain (R10.2)  Medical/Referring Diagnosis:  PFD (pelvic floor dysfunction) [T33.68]  Referring Physician:  Juan Kiser MD Orders:  PT Eval and Treat   Return MD Appt: 22   Date of Onset:  Onset Date:  ()     Allergies:  Latex and Nickel  Restrictions/Precautions:    Restrictions/Precautions: None  No data recorded   Medications Last Reviewed:  2022     SUBJECTIVE   History of Injury/Illness (Reason for Referral):  Ms. Clarissa Prasad is a 77 yo female referred to pelvic floor physical therapy (PFPT) by Juan Kiser,* 2/2 PFD (pelvic floor dysfunction) [M62.89] .    22:  Retired in . 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 Pushing/pulling a lot. Started noticing burning sensation after her mother's passing. Saw OBGYN was told that she had a prolapse. Late  woke up and was unable to urinate, intense burning and pressure. She was finally able to urinate and pain would go away. Saw urogyn in late  early . Dx w/ IC. Took [employer] and [employer]. Pain continued to get worse so dicontinued.  Requested referral to PT however states that she was not able to get this. Tried [employer], pumpkin seed oil, d-mannose, builder build, bladder ease for 4-5 months. States that she finds the most relief w/ heating pad and baking soda water. End up getting another opinion with Dr. Mcdaniel Abts office. Went to PT w/ Omar Mccarthy for two sessions in the fall (2021) but had to discontinue due to cost and 's medical care. Recently put on CPAP due to not sleeping well. About 1 month ago had O2 test. Discontinued due to \"not needing it\". Started on Lunesta. Wakes up after 1 hour and then will have to go to the bathroom. Since taking the Lunesta is able to get back to sleep more easily after waking. 8/31/22: Mrs. Hugo has been doing very well with decreased frequency and intensity of bladder/urethral pain. She is able to tolerate much more phsyical activity in order to complete housework and yard work with much less pain. She is walking daily. Urinary: Frequency 6 x/day, 3-4 x/night. Positive for nocturia. Urethral burning prior to urination, relieved w/ urination. Negative for stress urinary incontinence, urge urinary incontinence, urinary urgency, urinary frequency, incomplete emptying, urinary hesitancy/intermittency, dysuria, hematuria, and nocturnal enuresis. Pt does not use pads for urinary protection; 0 pads per day (PPD). Fluid intake: 64 oz water/day; bladder irritants include: 1c milk, 1-2c marshmallow root tea. Pt does not limit fluid intake due to bladder control. Bowel: Frequency 1x/day. Positive for  nothing . Negative for pain with bowel movement, pushing/straining with bowel movement, fecal incontinence, constipation, and incomplete emptying. Pt does not use pads for bowel protection; 0 pads per day (PPD). [place] stool type: 4. Use of stool softeners or laxatives? No    Sexual: Pt is not sexually active due to pain. Contraception/birth control: hysterectomy (parital).  History of sexual abuse: No    Pelvic Organ Prolapse/Pelvic Pain: Location: urethral pain. Worse with over doing physical activity/house work. Relieved with heating pad, baking soda water. Max pain 7/10 (\"maybe 1x/week\"). Min pain 0/10. Avg pain 3/10. Frequency and duration of pain is about 90% less. OB History: Number of pregnancies: 1 Number of vaginal deliveries: 1 Number of c-sections: 0. Patient Stated Goal(s):  \"to get my life back, if possible\"  Initial:     0/10 Post Session:     0/10  Past Medical History/Comorbidities:   Ms. Bismark Krishnamurthy  has a past medical history of Arthritis, Depressive disorder, not elsewhere classified, GERD (gastroesophageal reflux disease), Insomnia, unspecified, Unspecified adverse effect of anesthesia, Unspecified essential hypertension, and Vaginal prolapse. Ms. Bismark Krishnamurthy  has a past surgical history that includes Colonoscopy (10/12/2016); lipoma resection (Left); Colonoscopy (2021); orthopedic surgery (Right); Tonsillectomy (age 27); and Hysterectomy, total abdominal (20 yrs ago). OBJECTIVE   Tissue laxity test:  Anterior wall: [] minimum     [] moderate     [] severe      [x] WNL  Posterior wall: [] minimum     [] moderate     [] severe      [x] WNL    Palpation: via vaginal canal ; moderate tension, non tender throughout all PFM layers  Superficial Pelvic Floor Musculature (PFM): Tender? Intermediate PFM Tender? Deep PFM Tender?    Superficial Transverse Perineal [] Right  [] Left  []DNT Deep Transverse Perineal [] Right  [] Left  []DNT Puborectalis [] Right  [] Left  []DNT   Ischiocavernosus [] Right  [] Left  []DNT Compressor Urethra [] Right  [] Left  []DNT Pubococcygeus [] Right  [] Left  []DNT   Bulbocavernosus [] Right  [] Left  []DNT   Iliococcygeus [] Right  [] Left  []DNT       Obturator Internus [] Right  [] Left  []DNT       Coccygeus [] Right  [] Left  []DNT     Breath assessment:  Observation: A/P chest expansion and lateral rib expansion  Coordination of pelvic floor muscles with breath: minimal pelvic floor muscle excursion and moderate pelvic floor muscle excursion  Co-contraction of PFM with transversus abdominis: present    ASSESSMENT   DISCHARGE SUMMARY: Ms. Vesna Ames has been seen in skilled PT from 6/29/22 to 8/31/22. Patient has attended 10 out of 10 scheduled visits, with 0 cancellation(s) and 0 no shows. Treatment has emphasized manual therapy, bowel and bladder health education and retraining, pain management strategies, flexibility and relaxation, general strengthening and conditioning. Patient responded well to therapy, with improvements in urinary frequency, pain intensity and frequency and improved tolerance of physical activities and ADLs. Pt has achieved goals as indicated below and all questions have been answered to their satisfaction. Pt was invited to call with any further questions or concerns as needed. Initial Assessment: Sergio Hugo presents with musculoskeletal limitations including pelvic floor muscle (PFM) tension, reduced PFM Range of Motion (ROM), increased tenderness to palpation of the PFM, poor posture, reduced coordination and awareness of PFM, abdominal soft tissue restrictions, reduced hip strength and poor diaphragm excursion. These limitations are impairing the patient's ability to properly coordinate perineal elevation and descent for normalized PFM function, contributing to urinary dysfunction and voiding dysfunction. As a result, she is limited in her/his ability to participate in household chores, physical activities such as walking, swimming, or other exercise, entertainment activities such as going to a movie or concert, traveling by car or bus for a distance greater then 30 minutes away from home and social activities outside of the home.       PLAN   Effective Dates: 6/29/22 TO Plan of Care/Certification Expiration Date: 09/27/22     Frequency/Duration: Plan Frequency: 1x/week for 90 days     Interventions Planned (Treatment may consist of any combination of the following):    Current Treatment Recommendations: Strengthening; ROM; ADL/Self-care training; Neuromuscular re-education; Manual Therapy - Soft Tissue Mobilization; Pain management; Home exercise program; Patient/Caregiver education & training; Modalities; Positioning; Therapeutic activities     Goals: (Goals have been discussed and agreed upon with patient.)  Short-Term Functional Goals: Time Frame: 6 weeks  Pt will demonstrate I with basic PFM HEP to improve awareness, coordination, and timing of PFM. (MET 8/31/22)  Patient will demonstrate understanding of and ability to teach back appropriate water intake, bladder irritants, toileting frequency, and positioning for improved self-management of symptoms. (MET 8/31/22)  Patient will demonstrate ability to perform diaphragmatic breathing in multiple positions to assist in pelvic floor tension reduction. (MET 8/31/22)  Patient will verbalize understanding and demonstrate postural adjustments which facilitate lengthening and reduce overall pelvic floor muscle activity. (MET 8/31/22)  Patient will be able to identify triggers for muscle guarding and be able to teach back 3 exercises or strategies that may be performed daily to reduce pain or discomfort from 8 to 6. (MET 8/31/22)  Patient will engage in 15 minutes of physical activity per day in order to assist in stress management. (MET 8/31/22)  Discharge Goals: Time Frame: 12 weeks  Patient will be independent with implementation of a timed voiding schedule and use of urge suppression to reduce urinary frequency to 8/day and <2/night. (PARTIAL MET 8/31/22- night time frequency continues to be elevated, slightly improved, however pt has chronic sleep disturbances)  Patient will improve outcome score by 20%. (MET 8/31/22)  Patient will demonstrate independence with a home exercise program for aerobic conditioning, core stabilization, and general mobility for independent management of symptoms.  (MET 8/31/22)  Patient will demonstrate normal voluntary relaxation of the pelvic floor muscle group to improve pelvic floor ROM and improve pain management. (MET 8/31/22)       Outcome Measure:   Pelvic Floor Impact Questionnaire--short form 7 (PFIQ-7):  Score:  Initial: 6/29/22  Bladder or Urine: 76.19/100  Bowel or Rectum: 0/100  Vagina or Pelvis: 80.95/100 Most Recent: 8/31/22  Bladder or Urine: 23.81/100  Bowel or Rectum: 0/100  Vagina or Pelvis: 9.52/100   Interpretation of Score: Each of the 7 sections is scored on a scale from 0-3; 0 representing \"Not at all\", 3 representing \"Quite a bit\". The mean value is taken from all the answered items, then multiplied by 100 to obtain the scale score, ranging from 0-100. This process is repeated for each column representing bowel, bladder, and pelvic pain. Pt reported 80% improvement since start of PT. Total Duration:  Time In: 1000  Time Out: 1100    Regarding Tish Hugo's therapy, I certify that the treatment plan above will be carried out by a therapist or under their direction. Thank you for this referral,  Emilee Lake, PT     Referring Physician Signature: Dashawn Calle No Signature is Required for this note.         Post Session Pain  Charge Capture  PT Visit Info  POC Link  Treatment Note Link  MD Guidelines  Lloyd

## 2022-10-05 NOTE — PROCEDURE: BODY PHOTOGRAPHY
Number Of Photographs (Optional- Will Not Render If 0): 1
Consent: Written consent obtained, risks reviewed for whole body photography. Patient understands that photograph costs may not be covered by insurance, and patient is ultimately responsible for payment.
Was The Entire Body Photographed (Cannot Bill Unless Entire Body Photographed)?: No
Detail Level: Simple
Reason For Photography: The patient is obtaining  body photography to observe existing suspicious moles and or monitor for the appearance of any new lesions.  Will recheck at next OV. Patient instructed to call if changes occurred.
Whole Body Statement: The whole body was photographed today.
Orbicularis Oris Muscle Flap Text: The defect edges were debeveled with a #15 scalpel blade.  Given that the defect affected the competency of the oral sphincter an orbicularis oris muscle flap was deemed most appropriate to restore this competency and normal muscle function.  Using a sterile surgical marker, an appropriate flap was drawn incorporating the defect. The area thus outlined was incised with a #15 scalpel blade.

## 2022-11-08 DIAGNOSIS — E78.00 PURE HYPERCHOLESTEROLEMIA, UNSPECIFIED: Primary | ICD-10-CM

## 2022-11-09 ENCOUNTER — NURSE ONLY (OUTPATIENT)
Dept: FAMILY MEDICINE CLINIC | Facility: CLINIC | Age: 77
End: 2022-11-09

## 2022-11-09 DIAGNOSIS — E78.00 PURE HYPERCHOLESTEROLEMIA, UNSPECIFIED: ICD-10-CM

## 2022-11-09 LAB
ALBUMIN SERPL-MCNC: 3.6 G/DL (ref 3.2–4.6)
ALBUMIN/GLOB SERPL: 1.1 {RATIO} (ref 0.4–1.6)
ALP SERPL-CCNC: 90 U/L (ref 50–136)
ALT SERPL-CCNC: 21 U/L (ref 12–65)
ANION GAP SERPL CALC-SCNC: 3 MMOL/L (ref 2–11)
AST SERPL-CCNC: 15 U/L (ref 15–37)
BASOPHILS # BLD: 0 K/UL (ref 0–0.2)
BASOPHILS NFR BLD: 0 % (ref 0–2)
BILIRUB SERPL-MCNC: 0.7 MG/DL (ref 0.2–1.1)
BUN SERPL-MCNC: 16 MG/DL (ref 8–23)
CALCIUM SERPL-MCNC: 9.4 MG/DL (ref 8.3–10.4)
CHLORIDE SERPL-SCNC: 110 MMOL/L (ref 101–110)
CHOLEST SERPL-MCNC: 176 MG/DL
CO2 SERPL-SCNC: 27 MMOL/L (ref 21–32)
CREAT SERPL-MCNC: 0.9 MG/DL (ref 0.6–1)
DIFFERENTIAL METHOD BLD: ABNORMAL
EOSINOPHIL # BLD: 0.5 K/UL (ref 0–0.8)
EOSINOPHIL NFR BLD: 8 % (ref 0.5–7.8)
ERYTHROCYTE [DISTWIDTH] IN BLOOD BY AUTOMATED COUNT: 12.8 % (ref 11.9–14.6)
GLOBULIN SER CALC-MCNC: 3.4 G/DL (ref 2.8–4.5)
GLUCOSE SERPL-MCNC: 85 MG/DL (ref 65–100)
HCT VFR BLD AUTO: 47 % (ref 35.8–46.3)
HDLC SERPL-MCNC: 47 MG/DL (ref 40–60)
HDLC SERPL: 3.7 {RATIO}
HGB BLD-MCNC: 15.2 G/DL (ref 11.7–15.4)
IMM GRANULOCYTES # BLD AUTO: 0 K/UL (ref 0–0.5)
IMM GRANULOCYTES NFR BLD AUTO: 0 % (ref 0–5)
LDLC SERPL CALC-MCNC: 111.2 MG/DL
LYMPHOCYTES # BLD: 2.7 K/UL (ref 0.5–4.6)
LYMPHOCYTES NFR BLD: 45 % (ref 13–44)
MCH RBC QN AUTO: 30.2 PG (ref 26.1–32.9)
MCHC RBC AUTO-ENTMCNC: 32.3 G/DL (ref 31.4–35)
MCV RBC AUTO: 93.3 FL (ref 82–102)
MONOCYTES # BLD: 0.3 K/UL (ref 0.1–1.3)
MONOCYTES NFR BLD: 5 % (ref 4–12)
NEUTS SEG # BLD: 2.5 K/UL (ref 1.7–8.2)
NEUTS SEG NFR BLD: 42 % (ref 43–78)
NRBC # BLD: 0 K/UL (ref 0–0.2)
PLATELET # BLD AUTO: 205 K/UL (ref 150–450)
PMV BLD AUTO: 11.2 FL (ref 9.4–12.3)
POTASSIUM SERPL-SCNC: 4.3 MMOL/L (ref 3.5–5.1)
PROT SERPL-MCNC: 7 G/DL (ref 6.3–8.2)
RBC # BLD AUTO: 5.04 M/UL (ref 4.05–5.2)
SODIUM SERPL-SCNC: 140 MMOL/L (ref 133–143)
TRIGL SERPL-MCNC: 89 MG/DL (ref 35–150)
VLDLC SERPL CALC-MCNC: 17.8 MG/DL (ref 6–23)
WBC # BLD AUTO: 5.9 K/UL (ref 4.3–11.1)

## 2022-11-22 ENCOUNTER — OFFICE VISIT (OUTPATIENT)
Dept: FAMILY MEDICINE CLINIC | Facility: CLINIC | Age: 77
End: 2022-11-22
Payer: MEDICARE

## 2022-11-22 VITALS
WEIGHT: 128 LBS | OXYGEN SATURATION: 99 % | HEIGHT: 65 IN | SYSTOLIC BLOOD PRESSURE: 110 MMHG | HEART RATE: 76 BPM | BODY MASS INDEX: 21.33 KG/M2 | TEMPERATURE: 98 F | DIASTOLIC BLOOD PRESSURE: 60 MMHG

## 2022-11-22 DIAGNOSIS — F51.01 PRIMARY INSOMNIA: Primary | ICD-10-CM

## 2022-11-22 DIAGNOSIS — E78.00 PURE HYPERCHOLESTEROLEMIA, UNSPECIFIED: ICD-10-CM

## 2022-11-22 DIAGNOSIS — M81.0 AGE-RELATED OSTEOPOROSIS WITHOUT CURRENT PATHOLOGICAL FRACTURE: ICD-10-CM

## 2022-11-22 PROCEDURE — 1123F ACP DISCUSS/DSCN MKR DOCD: CPT | Performed by: STUDENT IN AN ORGANIZED HEALTH CARE EDUCATION/TRAINING PROGRAM

## 2022-11-22 PROCEDURE — 99214 OFFICE O/P EST MOD 30 MIN: CPT | Performed by: STUDENT IN AN ORGANIZED HEALTH CARE EDUCATION/TRAINING PROGRAM

## 2022-11-22 RX ORDER — ESZOPICLONE 3 MG/1
TABLET, FILM COATED ORAL
COMMUNITY
Start: 2022-11-04 | End: 2022-11-22 | Stop reason: SDUPTHER

## 2022-11-22 RX ORDER — ESZOPICLONE 3 MG/1
TABLET, FILM COATED ORAL
Qty: 30 TABLET | Refills: 5 | Status: SHIPPED | OUTPATIENT
Start: 2022-11-22 | End: 2022-12-22

## 2022-11-22 RX ORDER — CYCLOBENZAPRINE HCL 10 MG
10 TABLET ORAL DAILY PRN
Qty: 30 TABLET | Refills: 11 | Status: SHIPPED | OUTPATIENT
Start: 2022-11-22

## 2022-11-22 NOTE — PROGRESS NOTES
[facility]  Monica Reddy  Phone [phone]  Fax:  [phone]    Sherry Hugo (:  1945) is a 78 y.o. female here for evaluation of the following chief complaint(s): Insomnia and Back Pain (Review labs refills)       ASSESSMENT/PLAN:  1. Primary insomnia  -     eszopiclone (LUNESTA) 3 MG TABS; TAKE 1 TABLET BY MOUTH EVERY NIGHT, Disp-30 tablet, R-5Normal  2. Pure hypercholesterolemia, unspecified  -     CBC with Auto Differential; Future  -     Comprehensive Metabolic Panel; Future  -     Lipid Panel; Future  3. Age-related osteoporosis without current pathological fracture    Continue Lunesta for insomnia. Followed by urogynecology for interstitial cystitis. Recent lipid panel stable, not on medication for her hyperlipidemia. Last colonoscopy 2021, 5-year repeat recommended. Osteoporosis noted on DEXA scan in 2019, declines medication. States she cannot tolerate Vit D due to her IC. Last mammogram 2022. Return in about 1 year (around 2023) for routine f/u, labs prior. Subjective   SUBJECTIVE/OBJECTIVE:  HPI  65-year-old female with PMH of RAMON, HLD, osteoporosis, insomnia, and interstitial cystitis who presents for regular 6-month follow-up. -Takes Flexeril occasionally as needed for bladder spasms  -Went to pelvic floor PT which helped a lot  -States she was taken off CPAP    Review of Systems       Objective     Vitals:    22 0811   BP: 110/60   Pulse: 76   Temp: 98 °F (36.7 °C)   SpO2: 99%       Physical Exam  Vitals reviewed. Constitutional:       General: She is not in acute distress. Appearance: She is normal weight. HENT:      Head: Normocephalic and atraumatic. Cardiovascular:      Rate and Rhythm: Normal rate and regular rhythm. Pulmonary:      Effort: Pulmonary effort is normal.      Breath sounds: Normal breath sounds. Musculoskeletal:      Right lower leg: No edema. Left lower leg: No edema. Skin:     General: Skin is warm and dry. Neurological:      General: No focal deficit present. Mental Status: She is alert and oriented to person, place, and time. An electronic signature was used to authenticate this note.     --Alicia Brunner MD

## 2023-01-26 DIAGNOSIS — F51.01 PRIMARY INSOMNIA: Primary | ICD-10-CM

## 2023-01-26 RX ORDER — ESZOPICLONE 3 MG/1
TABLET, FILM COATED ORAL
COMMUNITY
Start: 2023-01-05 | End: 2023-01-26 | Stop reason: SDUPTHER

## 2023-01-26 RX ORDER — ESZOPICLONE 3 MG/1
TABLET, FILM COATED ORAL
Qty: 30 TABLET | Refills: 5 | Status: SHIPPED | OUTPATIENT
Start: 2023-02-02 | End: 2023-07-26

## 2023-02-16 ENCOUNTER — TELEPHONE (OUTPATIENT)
Dept: FAMILY MEDICINE CLINIC | Facility: CLINIC | Age: 78
End: 2023-02-16

## 2023-03-01 ENCOUNTER — APPOINTMENT (RX ONLY)
Dept: URBAN - METROPOLITAN AREA CLINIC 23 | Facility: CLINIC | Age: 78
Setting detail: DERMATOLOGY
End: 2023-03-01

## 2023-03-01 DIAGNOSIS — L24 IRRITANT CONTACT DERMATITIS: ICD-10-CM

## 2023-03-01 DIAGNOSIS — L57.8 OTHER SKIN CHANGES DUE TO CHRONIC EXPOSURE TO NONIONIZING RADIATION: ICD-10-CM

## 2023-03-01 DIAGNOSIS — L82.0 INFLAMED SEBORRHEIC KERATOSIS: ICD-10-CM

## 2023-03-01 DIAGNOSIS — L23.9 ALLERGIC CONTACT DERMATITIS, UNSPECIFIED CAUSE: ICD-10-CM

## 2023-03-01 PROBLEM — L24.9 IRRITANT CONTACT DERMATITIS, UNSPECIFIED CAUSE: Status: ACTIVE | Noted: 2023-03-01

## 2023-03-01 PROCEDURE — ? LIQUID NITROGEN

## 2023-03-01 PROCEDURE — 17110 DESTRUCTION B9 LES UP TO 14: CPT

## 2023-03-01 PROCEDURE — ? TREATMENT REGIMEN

## 2023-03-01 PROCEDURE — ? COUNSELING

## 2023-03-01 PROCEDURE — 99213 OFFICE O/P EST LOW 20 MIN: CPT | Mod: 25

## 2023-03-01 ASSESSMENT — LOCATION SIMPLE DESCRIPTION DERM
LOCATION SIMPLE: RIGHT FOREHEAD
LOCATION SIMPLE: NECK
LOCATION SIMPLE: LEFT FOREHEAD
LOCATION SIMPLE: RIGHT CHEEK

## 2023-03-01 ASSESSMENT — LOCATION DETAILED DESCRIPTION DERM
LOCATION DETAILED: RIGHT SUPERIOR CENTRAL MALAR CHEEK
LOCATION DETAILED: LEFT CENTRAL LATERAL NECK
LOCATION DETAILED: LEFT LATERAL FOREHEAD
LOCATION DETAILED: RIGHT SUPERIOR MEDIAL FOREHEAD
LOCATION DETAILED: LEFT SUPERIOR MEDIAL FOREHEAD

## 2023-03-01 ASSESSMENT — LOCATION ZONE DERM
LOCATION ZONE: FACE
LOCATION ZONE: NECK

## 2023-03-01 NOTE — PROCEDURE: MIPS QUALITY
Quality 431: Preventive Care And Screening: Unhealthy Alcohol Use - Screening: Patient not identified as an unhealthy alcohol user when screened for unhealthy alcohol use using a systematic screening method
Quality 110: Preventive Care And Screening: Influenza Immunization: Influenza Immunization previously received during influenza season
Quality 111:Pneumonia Vaccination Status For Older Adults: Pneumococcal vaccine (PPSV23) administered on or after patient’s 60th birthday and before the end of the measurement period
Quality 226: Preventive Care And Screening: Tobacco Use: Screening And Cessation Intervention: Patient screened for tobacco use and is an ex/non-smoker
Quality 402: Tobacco Use And Help With Quitting Among Adolescents: Patient screened for tobacco and never smoked
Quality 130: Documentation Of Current Medications In The Medical Record: Current Medications Documented
Detail Level: Detailed

## 2023-03-01 NOTE — PROCEDURE: LIQUID NITROGEN
Include Z78.9 (Other Specified Conditions Influencing Health Status) As An Associated Diagnosis?: No
Show Topical Anesthesia Variable?: Yes
Number Of Freeze-Thaw Cycles: 1 freeze-thaw cycle
Medical Necessity Clause: This procedure was medically necessary because the lesions that were treated were:
Detail Level: Detailed
Post-Care Instructions: I reviewed with the patient in detail post-care instructions. Patient may apply Vaseline to crusted or scabbing areas.
Spray Paint Text: The liquid nitrogen was applied to the skin utilizing a spray paint frosting technique.
Medical Necessity Information: It is in your best interest to select a reason for this procedure from the list below. All of these items fulfill various CMS LCD requirements except the new and changing color options.
Consent: The patient's verbal consent was obtained including but not limited to risks of crusting, scabbing, blistering, scarring, darker or lighter pigmentary change, recurrence, incomplete removal and infection.

## 2023-03-01 NOTE — PROCEDURE: TREATMENT REGIMEN
Initiate Treatment: OTC hydrocortisone cream bid x 2 weeks
Detail Level: Detailed
Plan: Recommend follow up with ophthalmologist for further evaluation and possible biopsy. Suspect tear drainage could be causing irritation.

## 2023-03-13 RX ORDER — CYCLOBENZAPRINE HCL 10 MG
10 TABLET ORAL DAILY PRN
Qty: 90 TABLET | Refills: 1 | Status: SHIPPED | OUTPATIENT
Start: 2023-03-13

## 2023-04-05 ENCOUNTER — OFFICE VISIT (OUTPATIENT)
Dept: FAMILY MEDICINE CLINIC | Facility: CLINIC | Age: 78
End: 2023-04-05
Payer: MEDICARE

## 2023-04-05 VITALS
DIASTOLIC BLOOD PRESSURE: 76 MMHG | WEIGHT: 130 LBS | HEART RATE: 91 BPM | HEIGHT: 65 IN | SYSTOLIC BLOOD PRESSURE: 122 MMHG | BODY MASS INDEX: 21.66 KG/M2 | OXYGEN SATURATION: 98 % | TEMPERATURE: 100 F

## 2023-04-05 DIAGNOSIS — J01.10 ACUTE FRONTAL SINUSITIS, RECURRENCE NOT SPECIFIED: Primary | ICD-10-CM

## 2023-04-05 PROCEDURE — 3017F COLORECTAL CA SCREEN DOC REV: CPT | Performed by: NURSE PRACTITIONER

## 2023-04-05 PROCEDURE — 99213 OFFICE O/P EST LOW 20 MIN: CPT | Performed by: NURSE PRACTITIONER

## 2023-04-05 PROCEDURE — G8399 PT W/DXA RESULTS DOCUMENT: HCPCS | Performed by: NURSE PRACTITIONER

## 2023-04-05 PROCEDURE — 1123F ACP DISCUSS/DSCN MKR DOCD: CPT | Performed by: NURSE PRACTITIONER

## 2023-04-05 PROCEDURE — 1036F TOBACCO NON-USER: CPT | Performed by: NURSE PRACTITIONER

## 2023-04-05 PROCEDURE — G8420 CALC BMI NORM PARAMETERS: HCPCS | Performed by: NURSE PRACTITIONER

## 2023-04-05 PROCEDURE — 1090F PRES/ABSN URINE INCON ASSESS: CPT | Performed by: NURSE PRACTITIONER

## 2023-04-05 PROCEDURE — G8427 DOCREV CUR MEDS BY ELIG CLIN: HCPCS | Performed by: NURSE PRACTITIONER

## 2023-04-05 RX ORDER — AZITHROMYCIN 250 MG/1
250 TABLET, FILM COATED ORAL SEE ADMIN INSTRUCTIONS
Qty: 6 TABLET | Refills: 0 | Status: SHIPPED | OUTPATIENT
Start: 2023-04-05 | End: 2023-04-10

## 2023-04-05 SDOH — ECONOMIC STABILITY: HOUSING INSECURITY
IN THE LAST 12 MONTHS, WAS THERE A TIME WHEN YOU DID NOT HAVE A STEADY PLACE TO SLEEP OR SLEPT IN A SHELTER (INCLUDING NOW)?: NO

## 2023-04-05 SDOH — ECONOMIC STABILITY: INCOME INSECURITY: HOW HARD IS IT FOR YOU TO PAY FOR THE VERY BASICS LIKE FOOD, HOUSING, MEDICAL CARE, AND HEATING?: NOT VERY HARD

## 2023-04-05 SDOH — ECONOMIC STABILITY: FOOD INSECURITY: WITHIN THE PAST 12 MONTHS, THE FOOD YOU BOUGHT JUST DIDN'T LAST AND YOU DIDN'T HAVE MONEY TO GET MORE.: NEVER TRUE

## 2023-04-05 SDOH — ECONOMIC STABILITY: TRANSPORTATION INSECURITY
IN THE PAST 12 MONTHS, HAS LACK OF TRANSPORTATION KEPT YOU FROM MEETINGS, WORK, OR FROM GETTING THINGS NEEDED FOR DAILY LIVING?: NO

## 2023-04-05 SDOH — ECONOMIC STABILITY: FOOD INSECURITY: WITHIN THE PAST 12 MONTHS, YOU WORRIED THAT YOUR FOOD WOULD RUN OUT BEFORE YOU GOT MONEY TO BUY MORE.: NEVER TRUE

## 2023-04-05 ASSESSMENT — PATIENT HEALTH QUESTIONNAIRE - PHQ9
SUM OF ALL RESPONSES TO PHQ9 QUESTIONS 1 & 2: 0
SUM OF ALL RESPONSES TO PHQ QUESTIONS 1-9: 0
2. FEELING DOWN, DEPRESSED OR HOPELESS: 0
SUM OF ALL RESPONSES TO PHQ QUESTIONS 1-9: 0
1. LITTLE INTEREST OR PLEASURE IN DOING THINGS: 0

## 2023-04-05 ASSESSMENT — ENCOUNTER SYMPTOMS
SINUS PRESSURE: 1
SWOLLEN GLANDS: 0
COUGH: 1
SINUS COMPLAINT: 1
SORE THROAT: 0
SHORTNESS OF BREATH: 0

## 2023-04-05 NOTE — PROGRESS NOTES
orders for this visit:    Acute frontal sinusitis, recurrence not specified  -     azithromycin (ZITHROMAX) 250 MG tablet; Take 1 tablet by mouth See Admin Instructions for 5 days 500mg on day 1 followed by 250mg on days 2 - 5       -Please let office know if symptoms do not improve over the next 1 to 2 weeks or if symptoms worsen. No follow-ups on file.          FLORES Barry - NP

## 2023-08-09 ENCOUNTER — HOSPITAL ENCOUNTER (OUTPATIENT)
Dept: PHYSICAL THERAPY | Age: 78
Setting detail: RECURRING SERIES
Discharge: HOME | End: 2023-08-12
Payer: MEDICARE

## 2023-08-09 PROCEDURE — 97162 PT EVAL MOD COMPLEX 30 MIN: CPT

## 2023-08-09 PROCEDURE — 97110 THERAPEUTIC EXERCISES: CPT

## 2023-08-09 ASSESSMENT — PAIN SCALES - GENERAL: PAINLEVEL_OUTOF10: 0

## 2023-08-09 NOTE — THERAPY EVALUATION
Eileen Hugo  : 1945  Primary: [address] Medicare (Medicare Managed)  Secondary:  [facility] [address] [address]  Phone: [phone]  Fax: [phone] Plan Frequency: 1x/week for 90 days    Plan of Care/Certification Expiration Date: 23      PT Visit Info:  Plan Frequency: 1x/week for 90 days  Plan of Care/Certification Expiration Date: 23  Total # of Visits to Date: 1      Visit Count:  1                OUTPATIENT PHYSICAL THERAPY: OP NOTE TYPE: Initial Assessment 2023               Episode (PFPT) Appt Desk         Treatment Diagnosis:  Lack of coordination (muscle incoordination) (R27.8)  Pelvic floor dysfunction in female (M62.98)  Hesitancy of micturition (R39.11)  Nocturia (R35.1)  Feeling of incomplete bladder emptying (R39.14)  Interstitial cystitis (chronic) (N30.1)  Bladder pain (R39.89)  Medical/Referring Diagnosis:  PFD (pelvic floor dysfunction) [M62.89]  Interstitial cystitis (chronic) without hematuria [N30.10]  Urethral pain [R39.89]  Referring Physician:  FLORES Sherman - NP  MD Orders:  PT Eval and Treat   Return MD Appt:  23  Date of Onset:  Onset Date:  (chronic)      Allergies:  Latex and Nickel  Restrictions/Precautions:           Medications Last Reviewed:  2023     SUBJECTIVE   History of Injury/Illness (Reason for Referral):  Ms. Terrie Grajeda is a 77 yo female referred to pelvic floor physical therapy (PFPT) by Kristin Mills,* 2/2 PFD (pelvic floor dysfunction) [M62.89]  Interstitial cystitis (chronic) without hematuria [N30.10]  Urethral pain [R39.89]. She is known to PT and was seen about 1 year ago for similar symptoms. She had good improvements in pain with PFPT in the past. In February she started having more pain, but did have days without pain. Her pain is now more frequent and intense. She reports burning in the urethra. She has continued to follow a IC adapted diet.  This is very

## 2023-08-09 NOTE — PROGRESS NOTES
contributing to bowel dysfunction, voiding dysfunction, and pain . As a result, she is limited in her/his ability to participate in household chores, physical activities such as walking, swimming, or other exercise, entertainment activities such as going to a movie or concert, traveling by car or bus for a distance greater then 30 minutes away from home, and social activities outside of the home. Communication/Consultation:  None today  Equipment provided today:  None  Recommendations/Intent for next treatment session: Next visit will focus on manual therapy, dilator training, down training.     >Total Treatment Billable Duration:  Evaluation 35 minutes, treatment 20 minutes (10 minutes TE, 10 minute TA)    Time In: 1130  Time Out: 1515 [facility], PT       Charge Capture  }Post Session Pain  PT Visit Info  [employer] Portal  MD Guidelines  Scanned Media  Benefits  MyChart    Future Appointments   Date Time Provider [address]   11/15/2023  8:30 AM [facility]   11/22/2023  8:30 AM Hoa Moss MD [facility]

## 2023-08-24 ENCOUNTER — HOSPITAL ENCOUNTER (OUTPATIENT)
Dept: PHYSICAL THERAPY | Age: 78
Setting detail: RECURRING SERIES
Discharge: HOME | End: 2023-08-27
Payer: MEDICARE

## 2023-08-24 PROCEDURE — 97110 THERAPEUTIC EXERCISES: CPT

## 2023-08-24 PROCEDURE — 97140 MANUAL THERAPY 1/> REGIONS: CPT

## 2023-08-24 PROCEDURE — 97530 THERAPEUTIC ACTIVITIES: CPT

## 2023-08-24 ASSESSMENT — PAIN SCALES - GENERAL: PAINLEVEL_OUTOF10: 0

## 2023-08-24 NOTE — PROGRESS NOTES
tissue health 8/9/23   Body mechanics     Posture/ergonomics     Diaphragmatic breathing     Resources and technology     Other patient education       MANUAL THERAPY: (15 minutes): Soft tissue mobilization was utilized and necessary because of the patient's restricted motion of soft tissue. Date Type Location Comments   8/24/2023 Internal assessment/treatment Via vaginal canal Single digit MT to all PFM with gentle SP    STM adductors Skin rolling    STM Abdominal wall Skin rolling and bladder mobilizations                           (Used abbreviations: MET - muscle energy technique; SCS- Strain counter strain; CTM-Connective tissue mobilizations; CR- Contract/Relax; SP- Sustained pressure; PIT- Positional inhibition techniques; STM Soft -tissue mobilization; MM- Myofascial mobilization; TrP-Trigger point release; IASTM- Instrument assisted soft tissue mobilizations, TDN-Trigger point dry needling)    Pt gives verbal consent to internal vaginal assessment/treatment without chaperon present. Treatment/Session Summary:    >Treatment Assessment:  Pt demonstrates improved relaxation of the superficial PFM layer with continued tension of layers 2 & 3, however no pain with palpation and manual treatment. She demonstrates decreased relaxation time during drops, only ~5 second vs. 10. We discussed slowing drops down as incomplete relaxation and doing too quickly may be contributing to some of the discomfort that she is experiencing with this exercise at times. We review her current stretching program, which she is very compliant with. She was able to demonstrate stretches well without cuing. We discussed some modifications for positioning and length of hold with the addition of a PF drop/relaxation at the beginning of every stretch, as well as taking time and slowing down.  Pt was educated on role of vaginal dilators and therawand in management of PFM and provided info to review prior to next

## 2023-08-31 ENCOUNTER — HOSPITAL ENCOUNTER (OUTPATIENT)
Dept: PHYSICAL THERAPY | Age: 78
Setting detail: RECURRING SERIES
End: 2023-08-31
Payer: MEDICARE

## 2023-08-31 PROCEDURE — 97110 THERAPEUTIC EXERCISES: CPT

## 2023-08-31 PROCEDURE — 97530 THERAPEUTIC ACTIVITIES: CPT

## 2023-08-31 PROCEDURE — 97140 MANUAL THERAPY 1/> REGIONS: CPT

## 2023-08-31 ASSESSMENT — PAIN SCALES - GENERAL: PAINLEVEL_OUTOF10: 0

## 2023-08-31 NOTE — PROGRESS NOTES
Bowel/Bladder log     Bowel health education     Constipation care     Diarrhea/Fecal leakage     Colonic massage     Toilet positioning     Defecation dynamics     Sources of fiber     Return to intercourse/Dilator training Intro to dilator/wand  Use of vaginal dilator/; positioning, cleaning, use 8/24/23 8/31/23   Sexual positioning     Lubricants/vaginal moisturizers     Vulvovaginal health/vaginal irritants Role of estrogen in tissue health 8/9/23   Body mechanics     Posture/ergonomics     Diaphragmatic breathing     Resources and technology     Other patient education       MANUAL THERAPY: (20 minutes): Soft tissue mobilization was utilized and necessary because of the patient's restricted motion of soft tissue. Date Type Location Comments   8/31/2023 Internal assessment/treatment Via vaginal canal Single digit and #1 vaginal  MT to all PFM with gentle SP    STM adductors Skin rolling    STM Abdominal wall Skin rolling and bladder mobilizations                           (Used abbreviations: MET - muscle energy technique; SCS- Strain counter strain; CTM-Connective tissue mobilizations; CR- Contract/Relax; SP- Sustained pressure; PIT- Positional inhibition techniques; STM Soft -tissue mobilization; MM- Myofascial mobilization; TrP-Trigger point release; IASTM- Instrument assisted soft tissue mobilizations, TDN-Trigger point dry needling)    Pt gives verbal consent to internal vaginal assessment/treatment without chaperon present. Treatment/Session Summary:    >Treatment Assessment:  Pt educated today in role and use of vaginal trainers. She tolerated progression to use of  well without increased pain. I believe this will be a benefical tool for patient to improve management of PFM mobilityand pain.   Communication/Consultation:  None today  Equipment provided today:  None  Recommendations/Intent for next treatment session: Next visit will focus on manual therapy-dilator training, down training.     >Total Treatment Billable Duration: 25 minutes TE, 10 minutes TA, 20 minutes MT    Time In: 1464  Time Out: 2684    Sarahy Brambila, PT       Charge Capture  }Post Session Pain  PT Visit Info  [employer] Portal  MD Guidelines  Scanned Media  Benefits  MyChart    Future Appointments   Date Time Provider [address]   9/6/2023  7:30 AM Sarahy Brambila, PT [facility]   9/13/2023  9:30 AM Sarahy Brambila, PT [facility]   9/20/2023  8:30 AM Sarahy Brambila, PT [facility]   9/27/2023  8:30 [facility]   11/15/2023  8:30 AM [facility]   11/22/2023  8:30 AM Cherri Anne MD [facility]

## 2023-09-06 ENCOUNTER — HOSPITAL ENCOUNTER (OUTPATIENT)
Dept: PHYSICAL THERAPY | Age: 78
Setting detail: RECURRING SERIES
Discharge: HOME | End: 2023-09-09
Payer: MEDICARE

## 2023-09-06 PROCEDURE — 97140 MANUAL THERAPY 1/> REGIONS: CPT

## 2023-09-06 PROCEDURE — 97530 THERAPEUTIC ACTIVITIES: CPT

## 2023-09-06 ASSESSMENT — PAIN SCALES - GENERAL: PAINLEVEL_OUTOF10: 0

## 2023-09-06 NOTE — PROGRESS NOTES
Kylah Hugo  : 1945  Primary: [address] [facility] Medicare (Medicare Managed)  Secondary:  [facility]  [address]  [place] [facility] [address]  Phone: [phone]  Fax: [phone] Plan Frequency: 1x/week for 90 days    Plan of Care/Certification Expiration Date: 23      >PT Visit Info:  Plan Frequency: 1x/week for 90 days  Plan of Care/Certification Expiration Date: 23  Total # of Visits to Date: 4      Visit Count:  4    OUTPATIENT PHYSICAL THERAPY:OP NOTE TYPE: OP Note Type: Treatment Note 2023       Episode  }Appt Desk             Treatment Diagnosis:  Lack of coordination (muscle incoordination) (R27.8)  Pelvic floor dysfunction in female (M62.98)  Hesitancy of micturition (R39.11)  Nocturia (R35.1)  Feeling of incomplete bladder emptying (R39.14)  Interstitial cystitis (chronic) (N30.1)  Bladder pain (R39.89)  Medical/Referring Diagnosis:  PFD (pelvic floor dysfunction) [M62.89]  Interstitial cystitis (chronic) without hematuria [N30.10]  Urethral pain [R39.89]  Referring Physician:  FLORES Delacruz NP, MD Orders:  PT Eval and Treat  Date of Onset:  Onset Date:  (chronic)     Allergies:   Latex and Nickel  Restrictions/Precautions:  No data recordedNo data recorded     Interventions Planned (Treatment may consist of any combination of the following):    Current Treatment Recommendations: Strengthening; ROM; ADL/Self-care training; IADL training; Neuromuscular re-education; Pain management; Home exercise program; Patient/Caregiver education & training; Positioning; Therapeutic activities; Manual     >Subjective Comments:  She has been doing well. She has had some pain every day, but duration of pain is about 1 hour. She has had some increased night time urinary frequency, about every hour. She has tried various bladder supplements including uribel, bladder ease, aloe vera and  an OAB medication in the past; all without relief or worsening of symptoms.

## 2023-09-13 ENCOUNTER — HOSPITAL ENCOUNTER (OUTPATIENT)
Dept: PHYSICAL THERAPY | Age: 78
Setting detail: RECURRING SERIES
Discharge: HOME | End: 2023-09-16
Payer: MEDICARE

## 2023-09-13 PROCEDURE — 97140 MANUAL THERAPY 1/> REGIONS: CPT

## 2023-09-13 PROCEDURE — 97530 THERAPEUTIC ACTIVITIES: CPT

## 2023-09-13 PROCEDURE — 97110 THERAPEUTIC EXERCISES: CPT

## 2023-09-13 ASSESSMENT — PAIN SCALES - GENERAL: PAINLEVEL_OUTOF10: 7

## 2023-09-13 ASSESSMENT — PAIN DESCRIPTION - LOCATION: LOCATION: HIP;LEG

## 2023-09-13 ASSESSMENT — PAIN DESCRIPTION - ORIENTATION: ORIENTATION: LEFT

## 2023-09-13 NOTE — PROGRESS NOTES
Michael Hugo  : 1945  Primary: [address] Medicare (Medicare Managed)  Secondary:  [facility]  [address] [facility] [address]  [place] [address]  Phone: [phone]  Fax: [phone] Plan Frequency: 1x/week for 90 days    Plan of Care/Certification Expiration Date: 23      >PT Visit Info:  Plan Frequency: 1x/week for 90 days  Plan of Care/Certification Expiration Date: 23  Total # of Visits to Date: 5      Visit Count:  5    OUTPATIENT PHYSICAL THERAPY:OP NOTE TYPE: OP Note Type: Treatment Note 2023       Episode  }Appt Desk             Treatment Diagnosis:  Lack of coordination (muscle incoordination) (R27.8)  Pelvic floor dysfunction in female (M62.98)  Hesitancy of micturition (R39.11)  Nocturia (R35.1)  Feeling of incomplete bladder emptying (R39.14)  Interstitial cystitis (chronic) (N30.1)  Bladder pain (R39.89)  Medical/Referring Diagnosis:  PFD (pelvic floor dysfunction) [M62.89]  Interstitial cystitis (chronic) without hematuria [N30.10]  Urethral pain [R39.89]  Referring Physician:  FLORES Willoughby NP, MD Orders:  PT Eval and Treat  Date of Onset:  Onset Date:  (chronic)     Allergies:   Latex and Nickel  Restrictions/Precautions:  No data recordedNo data recorded     Interventions Planned (Treatment may consist of any combination of the following):    Current Treatment Recommendations: Strengthening; ROM; ADL/Self-care training; IADL training; Neuromuscular re-education; Pain management; Home exercise program; Patient/Caregiver education & training; Positioning; Therapeutic activities; Manual     >Subjective Comments:  Had a really good day Wednesday. Had a lot of frequency/urgency symptoms on Friday evening. She has not tried using dilators becuase she just got the lubricant today.  Having some L hip and leg soreness today that she woke up with.  >Initial: Left Hip, Leg 7/10>Post Session:  Left  Hip, Leg 3/10  Medications Last Reviewed:

## 2023-09-20 ENCOUNTER — HOSPITAL ENCOUNTER (OUTPATIENT)
Dept: PHYSICAL THERAPY | Age: 78
Setting detail: RECURRING SERIES
Discharge: HOME | End: 2023-09-23
Payer: MEDICARE

## 2023-09-20 PROCEDURE — 97110 THERAPEUTIC EXERCISES: CPT

## 2023-09-20 PROCEDURE — 97140 MANUAL THERAPY 1/> REGIONS: CPT

## 2023-09-20 ASSESSMENT — PAIN SCALES - GENERAL: PAINLEVEL_OUTOF10: 0

## 2023-09-20 NOTE — PROGRESS NOTES
Kevin Hugo  : 1945  Primary: [address] Medicare (Medicare Managed)  Secondary:  [facility]  [address] [place] [address] [address]  Phone: [phone]  Fax: [phone] Plan Frequency: 1x/week for 90 days    Plan of Care/Certification Expiration Date: 23      >PT Visit Info:  Plan Frequency: 1x/week for 90 days  Plan of Care/Certification Expiration Date: 23  Total # of Visits to Date: 6      Visit Count:  6    OUTPATIENT PHYSICAL THERAPY:OP NOTE TYPE: OP Note Type: Treatment Note 2023       Episode  }Appt Desk             Treatment Diagnosis:  Lack of coordination (muscle incoordination) (R27.8)  Pelvic floor dysfunction in female (M62.98)  Hesitancy of micturition (R39.11)  Nocturia (R35.1)  Feeling of incomplete bladder emptying (R39.14)  Interstitial cystitis (chronic) (N30.1)  Bladder pain (R39.89)  Medical/Referring Diagnosis:  PFD (pelvic floor dysfunction) [M62.89]  Interstitial cystitis (chronic) without hematuria [N30.10]  Urethral pain [R39.89]  Referring Physician:  FLORES Millard NP, MD Orders:  PT Eval and Treat  Next MD f/u: 10/9/23  Date of Onset:  Onset Date:  (chronic)     Allergies:   Latex and Nickel  Restrictions/Precautions:  No data recordedNo data recorded     Interventions Planned (Treatment may consist of any combination of the following):    Current Treatment Recommendations: Strengthening; ROM; ADL/Self-care training; IADL training; Neuromuscular re-education; Pain management; Home exercise program; Patient/Caregiver education & training; Positioning; Therapeutic activities; Manual     >Subjective Comments:  Pt had some increased pain after our last session, this resolved pretty quickly. She had a doctor's appointment last week and had to get 2 shots, she had increased pain for 3 days after. She has used the dilators twice. Her hip is feeling much better.   >Initial:     0/10>Post Session:       0/10  Medications Last

## 2023-09-27 ENCOUNTER — APPOINTMENT (OUTPATIENT)
Dept: PHYSICAL THERAPY | Age: 78
End: 2023-09-27
Payer: MEDICARE

## 2023-09-28 ENCOUNTER — HOSPITAL ENCOUNTER (OUTPATIENT)
Dept: PHYSICAL THERAPY | Age: 78
Setting detail: RECURRING SERIES
End: 2023-09-28
Payer: MEDICARE

## 2023-09-28 PROCEDURE — 97140 MANUAL THERAPY 1/> REGIONS: CPT

## 2023-09-28 PROCEDURE — 97530 THERAPEUTIC ACTIVITIES: CPT

## 2023-09-28 ASSESSMENT — PAIN SCALES - GENERAL: PAINLEVEL_OUTOF10: 4

## 2023-09-28 NOTE — THERAPY DISCHARGE
Mirna Hugo  : 1945  Primary: [address] Medicare (Medicare Managed)  Secondary:  [facility]  [address]   [address] [facility] [address]  [facility] [address]  Phone: [phone]  Fax: [phone] Plan Frequency: 1x/week for 90 days    Plan of Care/Certification Expiration Date: 23      PT Visit Info:  Plan Frequency: 1x/week for 90 days  Plan of Care/Certification Expiration Date: 23  Total # of Visits to Date: 7      Visit Count:  7                OUTPATIENT PHYSICAL THERAPY: OP NOTE TYPE: Discharge Summary 2023               Episode (PFPT) Appt Desk         Treatment Diagnosis:  Lack of coordination (muscle incoordination) (R27.8)  Pelvic floor dysfunction in female (M62.98)  Hesitancy of micturition (R39.11)  Nocturia (R35.1)  Feeling of incomplete bladder emptying (R39.14)  Interstitial cystitis (chronic) (N30.1)  Bladder pain (R39.89)  Medical/Referring Diagnosis:  PFD (pelvic floor dysfunction) [M62.89]  Interstitial cystitis (chronic) without hematuria [N30.10]  Urethral pain [R39.89]  Referring Physician:  FLORES Smith NP, MD Orders:  PT Eval and Treat   Return MD Appt:  10/9/23  Date of Onset:  Onset Date:  (chronic)      Allergies:  Latex and Nickel  Restrictions/Precautions:           Medications Last Reviewed:  2023     SUBJECTIVE   History of Injury/Illness (Reason for Referral):  Ms. Flakita Cutler is a 79 yo female referred to pelvic floor physical therapy (PFPT) by Adali Baker,* 2/2 PFD (pelvic floor dysfunction) [M62.89]  Interstitial cystitis (chronic) without hematuria [N30.10]  Urethral pain [R39.89]. She is known to PT and was seen about 1 year ago for similar symptoms. She had good improvements in pain with PFPT in the past. In February she started having more pain, but did have days without pain. Her pain is now more frequent and intense. She reports burning in the urethra. She has continued to follow a IC adapted diet.  This is very MD Guidelines  MyChart

## 2023-09-28 NOTE — PROGRESS NOTES
Greta Hugo  : 1945  Primary: [address] [facility] Medicare (Medicare Managed)  Secondary:  [facility]  [address] Johan Alvarez Navarro Kam [address] [place] [id number]  Phone: [phone]  Fax: [phone] Plan Frequency: 1x/week for 90 days    Plan of Care/Certification Expiration Date: 23      >PT Visit Info:  Plan Frequency: 1x/week for 90 days  Plan of Care/Certification Expiration Date: 23  Total # of Visits to Date: 7      Visit Count:  7    OUTPATIENT PHYSICAL THERAPY:OP NOTE TYPE: OP Note Type: Treatment Note 2023       Episode  }Appt Desk             Treatment Diagnosis:  Lack of coordination (muscle incoordination) (R27.8)  Pelvic floor dysfunction in female (M62.98)  Hesitancy of micturition (R39.11)  Nocturia (R35.1)  Feeling of incomplete bladder emptying (R39.14)  Interstitial cystitis (chronic) (N30.1)  Bladder pain (R39.89)  Medical/Referring Diagnosis:  PFD (pelvic floor dysfunction) [M62.89]  Interstitial cystitis (chronic) without hematuria [N30.10]  Urethral pain [R39.89]  Referring Physician:  FLORES Browning NP, MD Orders:  PT Eval and Treat  Next MD f/u: 10/9/23  Date of Onset:  Onset Date:  (chronic)     Allergies:   Latex and Nickel  Restrictions/Precautions:  No data recordedNo data recorded     Interventions Planned (Treatment may consist of any combination of the following):    Current Treatment Recommendations: Strengthening; ROM; ADL/Self-care training; IADL training; Neuromuscular re-education; Pain management; Home exercise program; Patient/Caregiver education & training; Positioning; Therapeutic activities; Manual     >Subjective Comments:  Pt reports increased pain over the last week. She took baking soda water and her pain resolved in 30 minutes. She sees Sy next week. She has tried using estrogen cream on the urethra, but this causes her to burn worse than a flare; plans to talk to Sy about what other options she might have.   >Initial: CTM-Connective tissue mobilizations; CR- Contract/Relax; SP- Sustained pressure; PIT- Positional inhibition techniques; STM Soft -tissue mobilization; MM- Myofascial mobilization; TrP-Trigger point release; IASTM- Instrument assisted soft tissue mobilizations, TDN-Trigger point dry needling)    Pt gives verbal consent to internal vaginal assessment/treatment without chaperon present. Treatment/Session Summary:    >Treatment Assessment:     Communication/Consultation:  None today  Equipment provided today:  None  Recommendations/Intent for next treatment session: Next visit will focus on manual therapy-dilator training, down training.     >Total Treatment Billable Duration: 40 minutes TE, 15 minutes MT       Moody [place], PT       Charge Capture  }Post Session Pain  PT Visit Info  [facility] Portal  MD Guidelines  Scanned Media  Benefits  MyChart    Future Appointments   Date Time Provider [address] [place] [address]   11/15/2023  8:30 AM [facility]   11/22/2023  8:30 AM Lisette Barbosa MD [facility]

## 2024-03-16 ENCOUNTER — TRANSCRIBE ORDERS (OUTPATIENT)
Dept: SCHEDULING | Age: 79
End: 2024-03-16

## 2024-03-16 DIAGNOSIS — Z12.31 ENCOUNTER FOR SCREENING MAMMOGRAM FOR MALIGNANT NEOPLASM OF BREAST: Primary | ICD-10-CM

## 2024-05-15 ENCOUNTER — APPOINTMENT (RX ONLY)
Dept: URBAN - METROPOLITAN AREA CLINIC 23 | Facility: CLINIC | Age: 79
Setting detail: DERMATOLOGY
End: 2024-05-15

## 2024-05-15 DIAGNOSIS — D18.0 HEMANGIOMA: ICD-10-CM

## 2024-05-15 DIAGNOSIS — L82.1 OTHER SEBORRHEIC KERATOSIS: ICD-10-CM

## 2024-05-15 DIAGNOSIS — L50.8 OTHER URTICARIA: ICD-10-CM

## 2024-05-15 DIAGNOSIS — D22 MELANOCYTIC NEVI: ICD-10-CM

## 2024-05-15 DIAGNOSIS — L82.0 INFLAMED SEBORRHEIC KERATOSIS: ICD-10-CM

## 2024-05-15 DIAGNOSIS — L57.8 OTHER SKIN CHANGES DUE TO CHRONIC EXPOSURE TO NONIONIZING RADIATION: ICD-10-CM

## 2024-05-15 PROBLEM — D22.5 MELANOCYTIC NEVI OF TRUNK: Status: ACTIVE | Noted: 2024-05-15

## 2024-05-15 PROBLEM — D18.01 HEMANGIOMA OF SKIN AND SUBCUTANEOUS TISSUE: Status: ACTIVE | Noted: 2024-05-15

## 2024-05-15 PROCEDURE — 17110 DESTRUCTION B9 LES UP TO 14: CPT

## 2024-05-15 PROCEDURE — ? TREATMENT REGIMEN

## 2024-05-15 PROCEDURE — ? LIQUID NITROGEN

## 2024-05-15 PROCEDURE — 99213 OFFICE O/P EST LOW 20 MIN: CPT | Mod: 25

## 2024-05-15 PROCEDURE — ? COUNSELING

## 2024-05-15 ASSESSMENT — LOCATION SIMPLE DESCRIPTION DERM
LOCATION SIMPLE: LEFT FOREARM
LOCATION SIMPLE: CHEST
LOCATION SIMPLE: RIGHT FOREARM
LOCATION SIMPLE: RIGHT UPPER BACK
LOCATION SIMPLE: UPPER BACK
LOCATION SIMPLE: ABDOMEN
LOCATION SIMPLE: NECK

## 2024-05-15 ASSESSMENT — LOCATION ZONE DERM
LOCATION ZONE: ARM
LOCATION ZONE: TRUNK
LOCATION ZONE: NECK

## 2024-05-15 ASSESSMENT — LOCATION DETAILED DESCRIPTION DERM
LOCATION DETAILED: LEFT VENTRAL DISTAL FOREARM
LOCATION DETAILED: RIGHT INFERIOR LATERAL NECK
LOCATION DETAILED: RIGHT VENTRAL DISTAL FOREARM
LOCATION DETAILED: RIGHT CENTRAL LATERAL NECK
LOCATION DETAILED: RIGHT SUPERIOR MEDIAL UPPER BACK
LOCATION DETAILED: INFERIOR THORACIC SPINE
LOCATION DETAILED: PERIUMBILICAL SKIN
LOCATION DETAILED: LEFT LATERAL ABDOMEN
LOCATION DETAILED: MIDDLE STERNUM

## 2024-05-15 NOTE — PROCEDURE: TREATMENT REGIMEN
Detail Level: Detailed
Continue Regimen: Benadryl 25mg daily - pt dislikes taking but it's the only thing that works
Plan: Can refer to allergy at patient request to discuss Xolair.

## 2024-05-15 NOTE — PROCEDURE: LIQUID NITROGEN
Show Spray Paint Technique Variable?: Yes
Medical Necessity Information: It is in your best interest to select a reason for this procedure from the list below. All of these items fulfill various CMS LCD requirements except the new and changing color options.
Aperture Size (Optional): C
Medical Necessity Clause: This procedure was medically necessary because the lesions that were treated were:
Spray Paint Technique: No
Duration Of Freeze Thaw-Cycle (Seconds): 3
Spray Paint Text: The liquid nitrogen was applied to the skin utilizing a spray paint frosting technique.
Post-Care Instructions: I reviewed with the patient in detail post-care instructions. Patient may apply Vaseline to crusted or scabbing areas.
Detail Level: Detailed
Consent: The patient's verbal consent was obtained including but not limited to risks of crusting, scabbing, blistering, scarring, darker or lighter pigmentary change, recurrence, incomplete removal and infection.
Number Of Freeze-Thaw Cycles: 1 freeze-thaw cycle
Application Tool (Optional): Liquid Nitrogen Sprayer

## 2024-09-11 ENCOUNTER — OFFICE VISIT (OUTPATIENT)
Dept: ORTHOPEDIC SURGERY | Age: 79
End: 2024-09-11
Payer: MEDICARE

## 2024-09-11 VITALS — WEIGHT: 127 LBS | HEIGHT: 64 IN | BODY MASS INDEX: 21.68 KG/M2

## 2024-09-11 DIAGNOSIS — M25.562 LEFT KNEE PAIN, UNSPECIFIED CHRONICITY: Primary | ICD-10-CM

## 2024-09-11 DIAGNOSIS — Z96.651 S/P TOTAL KNEE ARTHROPLASTY, RIGHT: ICD-10-CM

## 2024-09-11 DIAGNOSIS — M17.12 PRIMARY OSTEOARTHRITIS OF LEFT KNEE: ICD-10-CM

## 2024-09-11 PROCEDURE — 1123F ACP DISCUSS/DSCN MKR DOCD: CPT | Performed by: PHYSICIAN ASSISTANT

## 2024-09-11 PROCEDURE — G8427 DOCREV CUR MEDS BY ELIG CLIN: HCPCS | Performed by: PHYSICIAN ASSISTANT

## 2024-09-11 PROCEDURE — 1090F PRES/ABSN URINE INCON ASSESS: CPT | Performed by: PHYSICIAN ASSISTANT

## 2024-09-11 PROCEDURE — G8399 PT W/DXA RESULTS DOCUMENT: HCPCS | Performed by: PHYSICIAN ASSISTANT

## 2024-09-11 PROCEDURE — 1036F TOBACCO NON-USER: CPT | Performed by: PHYSICIAN ASSISTANT

## 2024-09-11 PROCEDURE — G8420 CALC BMI NORM PARAMETERS: HCPCS | Performed by: PHYSICIAN ASSISTANT

## 2024-09-11 PROCEDURE — 99203 OFFICE O/P NEW LOW 30 MIN: CPT | Performed by: PHYSICIAN ASSISTANT

## 2024-09-11 RX ORDER — FAMOTIDINE 40 MG/1
TABLET, FILM COATED ORAL
COMMUNITY

## 2024-09-11 RX ORDER — AMITRIPTYLINE HYDROCHLORIDE 10 MG/1
10 TABLET ORAL
COMMUNITY
Start: 2023-12-28

## 2024-09-11 RX ORDER — ESTRADIOL 0.1 MG/G
CREAM VAGINAL
COMMUNITY
Start: 2023-06-06

## 2024-09-11 RX ORDER — ESZOPICLONE 3 MG/1
TABLET, FILM COATED ORAL
COMMUNITY

## 2024-09-18 ENCOUNTER — TELEPHONE (OUTPATIENT)
Dept: ORTHOPEDIC SURGERY | Age: 79
End: 2024-09-18

## 2025-02-24 ENCOUNTER — OFFICE VISIT (OUTPATIENT)
Dept: OBGYN CLINIC | Age: 80
End: 2025-02-24
Payer: MEDICARE

## 2025-02-24 VITALS
DIASTOLIC BLOOD PRESSURE: 86 MMHG | HEIGHT: 64 IN | WEIGHT: 126.1 LBS | BODY MASS INDEX: 21.53 KG/M2 | SYSTOLIC BLOOD PRESSURE: 146 MMHG

## 2025-02-24 DIAGNOSIS — N90.89 VULVAR LESION: Primary | ICD-10-CM

## 2025-02-24 PROCEDURE — 1123F ACP DISCUSS/DSCN MKR DOCD: CPT | Performed by: OBSTETRICS & GYNECOLOGY

## 2025-02-24 PROCEDURE — 99212 OFFICE O/P EST SF 10 MIN: CPT | Performed by: OBSTETRICS & GYNECOLOGY

## 2025-02-24 RX ORDER — CETIRIZINE HYDROCHLORIDE 5 MG/1
5 TABLET ORAL DAILY
COMMUNITY

## 2025-04-14 ENCOUNTER — TELEPHONE (OUTPATIENT)
Dept: UROLOGY | Age: 80
End: 2025-04-14

## 2025-04-29 ENCOUNTER — TRANSCRIBE ORDERS (OUTPATIENT)
Dept: SCHEDULING | Age: 80
End: 2025-04-29

## 2025-04-29 DIAGNOSIS — Z12.31 OTHER SCREENING MAMMOGRAM: Primary | ICD-10-CM

## 2025-05-06 ENCOUNTER — TRANSCRIBE ORDERS (OUTPATIENT)
Dept: SCHEDULING | Age: 80
End: 2025-05-06

## 2025-05-06 DIAGNOSIS — Z78.0 POST-MENOPAUSAL: ICD-10-CM

## 2025-05-06 DIAGNOSIS — M81.0 OSTEOPOROSIS WITHOUT PATHOLOGICAL FRACTURE: Primary | ICD-10-CM

## 2025-05-21 ENCOUNTER — APPOINTMENT (OUTPATIENT)
Dept: URBAN - METROPOLITAN AREA CLINIC 23 | Facility: CLINIC | Age: 80
Setting detail: DERMATOLOGY
End: 2025-05-21

## 2025-05-21 DIAGNOSIS — D18.0 HEMANGIOMA: ICD-10-CM

## 2025-05-21 DIAGNOSIS — D22 MELANOCYTIC NEVI: ICD-10-CM

## 2025-05-21 DIAGNOSIS — L50.8 OTHER URTICARIA: ICD-10-CM

## 2025-05-21 DIAGNOSIS — L57.8 OTHER SKIN CHANGES DUE TO CHRONIC EXPOSURE TO NONIONIZING RADIATION: ICD-10-CM

## 2025-05-21 PROBLEM — D18.01 HEMANGIOMA OF SKIN AND SUBCUTANEOUS TISSUE: Status: ACTIVE | Noted: 2025-05-21

## 2025-05-21 PROBLEM — D22.5 MELANOCYTIC NEVI OF TRUNK: Status: ACTIVE | Noted: 2025-05-21

## 2025-05-21 PROCEDURE — ? TREATMENT REGIMEN

## 2025-05-21 PROCEDURE — ? COUNSELING

## 2025-05-21 PROCEDURE — ? PRESCRIPTION

## 2025-05-21 PROCEDURE — 99213 OFFICE O/P EST LOW 20 MIN: CPT

## 2025-05-21 RX ORDER — HYDROCORTISONE 1 %
CREAM (GRAM) TOPICAL
Qty: 1 | Refills: 1 | Status: ERX | COMMUNITY
Start: 2025-05-21

## 2025-05-21 RX ADMIN — Medication: at 00:00

## 2025-05-21 ASSESSMENT — LOCATION DETAILED DESCRIPTION DERM
LOCATION DETAILED: RIGHT VENTRAL DISTAL FOREARM
LOCATION DETAILED: RIGHT SUPERIOR MEDIAL UPPER BACK
LOCATION DETAILED: LEFT LATERAL ABDOMEN
LOCATION DETAILED: MIDDLE STERNUM
LOCATION DETAILED: PERIUMBILICAL SKIN
LOCATION DETAILED: LEFT VENTRAL DISTAL FOREARM
LOCATION DETAILED: RIGHT VENTRAL PROXIMAL FOREARM

## 2025-05-21 ASSESSMENT — LOCATION ZONE DERM
LOCATION ZONE: TRUNK
LOCATION ZONE: ARM

## 2025-05-21 ASSESSMENT — LOCATION SIMPLE DESCRIPTION DERM
LOCATION SIMPLE: RIGHT UPPER BACK
LOCATION SIMPLE: CHEST
LOCATION SIMPLE: RIGHT FOREARM
LOCATION SIMPLE: ABDOMEN
LOCATION SIMPLE: LEFT FOREARM

## 2025-05-21 NOTE — PROCEDURE: TREATMENT REGIMEN
Detail Level: Detailed
Continue Regimen: Benadryl 25mg daily - pt dislikes taking but it's the only thing that works
Plan: . Insurance would not cover Xolair

## 2025-06-23 ENCOUNTER — TRANSCRIBE ORDERS (OUTPATIENT)
Dept: SCHEDULING | Age: 80
End: 2025-06-23

## 2025-06-23 DIAGNOSIS — I49.3 VENTRICULAR PREMATURE DEPOLARIZATION: Primary | ICD-10-CM

## 2025-07-01 ENCOUNTER — HOSPITAL ENCOUNTER (OUTPATIENT)
Dept: NUCLEAR MEDICINE | Age: 80
Discharge: HOME | End: 2025-07-03
Payer: MEDICARE

## 2025-07-01 DIAGNOSIS — I49.3 VENTRICULAR PREMATURE DEPOLARIZATION: ICD-10-CM

## 2025-07-01 LAB
STRESS BASELINE DIAS BP: 64 MMHG
STRESS BASELINE HR: 58 BPM
STRESS BASELINE SYS BP: 128 MMHG
STRESS EXERCISE DUR MIN: 4 MIN
STRESS PEAK DIAS BP: 87 MMHG
STRESS PEAK SYS BP: 168 MMHG
STRESS PERCENT HR ACHIEVED: 67 %
STRESS POST PEAK HR: 96 BPM
STRESS RATE PRESSURE PRODUCT: NORMAL BPM*MMHG
STRESS STAGE 1 BP: NORMAL MMHG
STRESS STAGE 1 DURATION: 1 MIN:SEC
STRESS STAGE 1 HR: 82 BPM
STRESS STAGE 2 BP: NORMAL MMHG
STRESS STAGE 2 DURATION: 1 MIN:SEC
STRESS STAGE 2 HR: 96 BPM
STRESS STAGE 3 BP: NORMAL MMHG
STRESS STAGE 3 DURATION: 1 MIN:SEC
STRESS STAGE 3 HR: 92 BPM
STRESS STAGE RECOVERY 1 BP: NORMAL MMHG
STRESS STAGE RECOVERY 1 DURATION: 1 MIN:SEC
STRESS STAGE RECOVERY 1 HR: 92 BPM
STRESS TARGET HR: 143 BPM
TID: 1.01

## 2025-07-01 PROCEDURE — 3430000000 HC RX DIAGNOSTIC RADIOPHARMACEUTICAL: Performed by: INTERNAL MEDICINE

## 2025-07-01 PROCEDURE — 78452 HT MUSCLE IMAGE SPECT MULT: CPT | Performed by: INTERNAL MEDICINE

## 2025-07-01 PROCEDURE — A9500 TC99M SESTAMIBI: HCPCS | Performed by: INTERNAL MEDICINE

## 2025-07-01 PROCEDURE — 93018 CV STRESS TEST I&R ONLY: CPT | Performed by: INTERNAL MEDICINE

## 2025-07-01 PROCEDURE — 93017 CV STRESS TEST TRACING ONLY: CPT

## 2025-07-01 PROCEDURE — 6360000002 HC RX W HCPCS: Performed by: INTERNAL MEDICINE

## 2025-07-01 PROCEDURE — 78452 HT MUSCLE IMAGE SPECT MULT: CPT

## 2025-07-01 PROCEDURE — 93016 CV STRESS TEST SUPVJ ONLY: CPT | Performed by: INTERNAL MEDICINE

## 2025-07-01 RX ORDER — TETRAKIS(2-METHOXYISOBUTYLISOCYANIDE)COPPER(I) TETRAFLUOROBORATE 1 MG/ML
10.5 INJECTION, POWDER, LYOPHILIZED, FOR SOLUTION INTRAVENOUS
Status: COMPLETED | OUTPATIENT
Start: 2025-07-01 | End: 2025-07-01

## 2025-07-01 RX ORDER — TETRAKIS(2-METHOXYISOBUTYLISOCYANIDE)COPPER(I) TETRAFLUOROBORATE 1 MG/ML
31 INJECTION, POWDER, LYOPHILIZED, FOR SOLUTION INTRAVENOUS
Status: COMPLETED | OUTPATIENT
Start: 2025-07-01 | End: 2025-07-01

## 2025-07-01 RX ORDER — REGADENOSON 0.08 MG/ML
0.4 INJECTION, SOLUTION INTRAVENOUS
Status: COMPLETED | OUTPATIENT
Start: 2025-07-01 | End: 2025-07-01

## 2025-07-01 RX ADMIN — REGADENOSON 0.4 MG: 0.08 INJECTION, SOLUTION INTRAVENOUS at 09:50

## 2025-07-01 RX ADMIN — KIT FOR THE PREPARATION OF TECHNETIUM TC99M SESTAMIBI 31 MILLICURIE: 1 INJECTION, POWDER, LYOPHILIZED, FOR SOLUTION PARENTERAL at 10:00

## 2025-07-01 RX ADMIN — KIT FOR THE PREPARATION OF TECHNETIUM TC99M SESTAMIBI 10.5 MILLICURIE: 1 INJECTION, POWDER, LYOPHILIZED, FOR SOLUTION PARENTERAL at 08:40

## 2025-07-14 ENCOUNTER — HOSPITAL ENCOUNTER (OUTPATIENT)
Dept: NON INVASIVE DIAGNOSTICS | Age: 80
Discharge: HOME | End: 2025-07-16
Payer: MEDICARE

## 2025-07-14 DIAGNOSIS — I10 ESSENTIAL HYPERTENSION, MALIGNANT: ICD-10-CM

## 2025-07-14 LAB
ECHO AO ASC DIAM: 3.1 CM
ECHO AO ROOT DIAM: 2.9 CM
ECHO AV AREA PEAK VELOCITY: 2.5 CM2
ECHO AV AREA VTI: 2.2 CM2
ECHO AV MEAN GRADIENT: 4 MMHG
ECHO AV MEAN VELOCITY: 0.9 M/S
ECHO AV PEAK GRADIENT: 7 MMHG
ECHO AV PEAK VELOCITY: 1.3 M/S
ECHO AV PEAK VELOCITY: 1.3 M/S
ECHO AV VTI: 32.9 CM
ECHO EST RA PRESSURE: 3 MMHG
ECHO IVC PROX: 1.9 CM
ECHO LA AREA 2C: 19.3 CM2
ECHO LA AREA 4C: 19 CM2
ECHO LA DIAMETER: 3.7 CM
ECHO LA MAJOR AXIS: 5 CM
ECHO LA MINOR AXIS: 5.5 CM
ECHO LA TO AORTIC ROOT RATIO: 1.28
ECHO LA VOL BP: 60 ML (ref 22–52)
ECHO LA VOL MOD A2C: 56 ML (ref 22–52)
ECHO LA VOL MOD A4C: 58 ML (ref 22–52)
ECHO LV E' LATERAL VELOCITY: 10.6 CM/S
ECHO LV E' SEPTAL VELOCITY: 10.4 CM/S
ECHO LV EDV A2C: 81 ML
ECHO LV EDV A4C: 65 ML
ECHO LV EF PHYSICIAN: 60 %
ECHO LV EJECTION FRACTION A2C: 63 %
ECHO LV EJECTION FRACTION A4C: 61 %
ECHO LV EJECTION FRACTION BIPLANE: 62 % (ref 55–100)
ECHO LV ESV A2C: 30 ML
ECHO LV ESV A4C: 25 ML
ECHO LV FRACTIONAL SHORTENING: 32 % (ref 28–44)
ECHO LV INTERNAL DIMENSION DIASTOLIC: 4.1 CM (ref 3.9–5.3)
ECHO LV INTERNAL DIMENSION SYSTOLIC: 2.8 CM
ECHO LV IVSD: 0.9 CM (ref 0.6–0.9)
ECHO LV MASS 2D: 114.1 G (ref 67–162)
ECHO LV POSTERIOR WALL DIASTOLIC: 0.9 CM (ref 0.6–0.9)
ECHO LV RELATIVE WALL THICKNESS RATIO: 0.44
ECHO LVOT AREA: 3.1 CM2
ECHO LVOT AV VTI INDEX: 0.71
ECHO LVOT DIAM: 2 CM
ECHO LVOT MEAN GRADIENT: 2 MMHG
ECHO LVOT MEAN GRADIENT: 2 MMHG
ECHO LVOT PEAK GRADIENT: 4 MMHG
ECHO LVOT PEAK VELOCITY: 1.1 M/S
ECHO LVOT SV: 72.8 ML
ECHO LVOT VTI: 23.2 CM
ECHO MV A VELOCITY: 0.66 M/S
ECHO MV AREA VTI: 2.7 CM2
ECHO MV E DECELERATION TIME (DT): 176 MS
ECHO MV E VELOCITY: 0.82 M/S
ECHO MV E/A RATIO: 1.24
ECHO MV E/E' LATERAL: 7.74
ECHO MV E/E' RATIO (AVERAGED): 7.81
ECHO MV E/E' SEPTAL: 7.88
ECHO MV LVOT VTI INDEX: 1.16
ECHO MV MAX VELOCITY: 0.9 M/S
ECHO MV MEAN GRADIENT: 1 MMHG
ECHO MV MEAN VELOCITY: 0.5 M/S
ECHO MV PEAK GRADIENT: 3 MMHG
ECHO MV VTI: 26.9 CM
ECHO PV ACCELERATION TIME (AT): 87 MS
ECHO PV MAX VELOCITY: 1.1 M/S
ECHO PV PEAK GRADIENT: 5 MMHG
ECHO PV PEAK GRADIENT: 5 MMHG
ECHO RV FREE WALL PEAK S': 13.4 CM/S
ECHO RV INTERNAL DIMENSION: 3.1 CM
ECHO RV TAPSE: 2.2 CM (ref 1.7–?)

## 2025-07-14 PROCEDURE — 93306 TTE W/DOPPLER COMPLETE: CPT

## 2025-07-14 PROCEDURE — 93306 TTE W/DOPPLER COMPLETE: CPT | Performed by: INTERNAL MEDICINE
